# Patient Record
Sex: FEMALE | Race: WHITE | NOT HISPANIC OR LATINO | Employment: OTHER | URBAN - METROPOLITAN AREA
[De-identification: names, ages, dates, MRNs, and addresses within clinical notes are randomized per-mention and may not be internally consistent; named-entity substitution may affect disease eponyms.]

---

## 2018-05-15 ENCOUNTER — OFFICE VISIT (OUTPATIENT)
Dept: ORTHOPEDIC SURGERY | Facility: CLINIC | Age: 78
End: 2018-05-15

## 2018-05-15 VITALS — WEIGHT: 170.19 LBS | BODY MASS INDEX: 30.16 KG/M2 | HEART RATE: 98 BPM | OXYGEN SATURATION: 98 % | HEIGHT: 63 IN

## 2018-05-15 DIAGNOSIS — Z96.651 STATUS POST TOTAL RIGHT KNEE REPLACEMENT: ICD-10-CM

## 2018-05-15 DIAGNOSIS — M17.12 PRIMARY OSTEOARTHRITIS OF LEFT KNEE: ICD-10-CM

## 2018-05-15 DIAGNOSIS — M17.11 PRIMARY OSTEOARTHRITIS OF RIGHT KNEE: Primary | ICD-10-CM

## 2018-05-15 PROCEDURE — 20610 DRAIN/INJ JOINT/BURSA W/O US: CPT | Performed by: ORTHOPAEDIC SURGERY

## 2018-05-15 PROCEDURE — 99203 OFFICE O/P NEW LOW 30 MIN: CPT | Performed by: ORTHOPAEDIC SURGERY

## 2018-05-15 RX ORDER — LEVOTHYROXINE SODIUM 0.07 MG/1
75 TABLET ORAL DAILY
COMMUNITY
Start: 2018-04-21 | End: 2022-08-23

## 2018-05-15 RX ORDER — LIDOCAINE HYDROCHLORIDE 10 MG/ML
3 INJECTION, SOLUTION INFILTRATION; PERINEURAL
Status: COMPLETED | OUTPATIENT
Start: 2018-05-15 | End: 2018-05-15

## 2018-05-15 RX ORDER — ASPIRIN 81 MG/1
81 TABLET, CHEWABLE ORAL DAILY
Status: ON HOLD | COMMUNITY
End: 2019-08-23 | Stop reason: SDUPTHER

## 2018-05-15 RX ORDER — LANSOPRAZOLE 15 MG/1
15 CAPSULE, DELAYED RELEASE ORAL DAILY
COMMUNITY
End: 2019-08-08

## 2018-05-15 RX ORDER — PAROXETINE HYDROCHLORIDE 20 MG/1
20 TABLET, FILM COATED ORAL EVERY MORNING
COMMUNITY
Start: 2018-02-24

## 2018-05-15 RX ORDER — TRIAMCINOLONE ACETONIDE 40 MG/ML
40 INJECTION, SUSPENSION INTRA-ARTICULAR; INTRAMUSCULAR
Status: COMPLETED | OUTPATIENT
Start: 2018-05-15 | End: 2018-05-15

## 2018-05-15 RX ADMIN — TRIAMCINOLONE ACETONIDE 40 MG: 40 INJECTION, SUSPENSION INTRA-ARTICULAR; INTRAMUSCULAR at 15:20

## 2018-05-15 RX ADMIN — LIDOCAINE HYDROCHLORIDE 3 ML: 10 INJECTION, SOLUTION INFILTRATION; PERINEURAL at 15:20

## 2018-05-15 NOTE — PROGRESS NOTES
Procedure   Large Joint Injection   Date/Time: 5/15/2018 3:20 PM  Consent given by: patient  Site marked: site marked  Timeout: Immediately prior to procedure a time out was called to verify the correct patient, procedure, equipment, support staff and site/side marked as required   Supporting Documentation  Indications: pain   Procedure Details  Location: knee - L knee  Needle size: 25 G  Approach: anteromedial  Medications administered: 3 mL lidocaine 1 %; 40 mg triamcinolone acetonide 40 MG/ML  Patient tolerance: patient tolerated the procedure well with no immediate complications

## 2018-05-15 NOTE — PROGRESS NOTES
[facility] [place] Orthopa[facility] Clinic Note    Subjective     Pain of the Right Knee (Pain feels as if it is in the back of the knee. Right was replaced about 9 years ago. Patient has fallen in the past few months. (january/February)) and Pain of the Left Knee (Pain is mainly all over your knee. Pain normally comes and goes, but began about 6 months ago. Taken tylenol to help alleviate some pain. Uses pensaid as well. Pain scale is a 8/10.  )      KRISTOPHER Clayton is a 78 y.o. female. Patient's here for right and left knee pain.  Patient had right total knee arthroplasty 9 years ago.  She did well until recently.  She's had a little bit of pain in the back of the knee.  This was not the case after surgery.  With regards to her left knee, she has anterior lateral pain.  She has posterior knee pain.  She has difficulty kneeling and squatting.  She has been taking over-the-counter medication without a lot of relief.  She's had no further treatment on either knee recently.  Dr. Valentin did her right total knee arthroplasty.     No past medical history on file.   Past Surgical History:   Procedure Laterality Date   • KNEE SURGERY Right       Family History   Problem Relation Age of Onset   • Diabetes Mother    • Hypertension Father    • Stroke Father    • Heart attack Father      Social History     Social History   • Marital status:      Spouse name: N/A   • Number of children: N/A   • Years of education: N/A     Occupational History   • Not on file.     Social History Main Topics   • Smoking status: Never Smoker   • Smokeless tobacco: Never Used   • Alcohol use No   • Drug use: No   • Sexual activity: Defer     Other Topics Concern   • Not on file     Social History Narrative   • No narrative on file      No current outpatient prescriptions on file prior to visit.     No current facility-administered medications on file prior to visit.       Allergies   Allergen Reactions   • Codeine Nausea And Vomiting   •  "Latex Hives        The following portions of the patient's history were reviewed and updated as appropriate: allergies, current medications, past family history, past medical history, past social history, past surgical history and problem list.    Review of Systems   Constitutional: Negative.    HENT: Positive for hearing loss, sinus pressure, sneezing, sore throat and tinnitus.    Eyes: Positive for redness.   Respiratory: Positive for cough and shortness of breath.    Cardiovascular: Positive for leg swelling.   Gastrointestinal: Negative.    Endocrine: Positive for cold intolerance.   Genitourinary: Negative.    Musculoskeletal: Positive for arthralgias, back pain and gait problem.   Skin: Negative.    Allergic/Immunologic: Positive for environmental allergies.   Neurological: Positive for dizziness, light-headedness and headaches.   Hematological: Negative.    Psychiatric/Behavioral: Negative.         Objective      Physical Exam  Pulse 98   Ht 160 cm (62.99\")   Wt 77.2 kg (170 lb 3.1 oz)   SpO2 98%   BMI 30.16 kg/m²     Body mass index is 30.16 kg/m².    General  Mental Status - alert  General Appearance - cooperative, well groomed, not in acute distress  Orientation - Oriented X3  Build & Nutrition - well developed and well nourished  Posture - normal posture  Gait - normal gait     Integumentary  Global Assessment  Examination of related systems reveals - no lymphadenopathy  General Characteristics  Overall examination of the patient's skin reveals - no rashes, no evidence of scars, no suspicious lesions and no bruises.  Color - normal coloration of skin.    Ortho Exam  Peripheral Vascular:    Upper Extremity:   Inspection:  Left--no cyanotic nail beds Right--no cyanotic nail beds   Bilateral:  Pink nail beds with brisk capillary refill   Palpation:  Bilateral radial pulse normal    Musculoskeletal:  Global Assessment:  Overall assessment of Lower Extremity Muscle Strength and Tone:  Left " quadriceps--5/5   Left hamstrings--5/5       Left tibialis anterior--5/5  Left gastroc-soleus--5/5  Left EHL --5/5    Lower Extremity:  Knee/Patella:  No digital clubbing or cyanosis.    Examination of left knee reveals:  Normal deep tendon reflexes, coordination, strength, tone, sensation.  No known fractures or deformities.    Inspection and Palpation:  Left knee:  Tenderness:  Over the lateral joint line and moderate severity  Effusion:  1+  Crepitus:  Positive  Pulses:  2+  Ecchymosis:  None  Warmth:  None     ROM:  Left:  Extension:5    Flexion:120  Right:  Extension:5     Flexion:120    Instability:    Left:  Lachman Test:  Negative, Varus stress test negative, Valgus stress test negative    Deformities/Malalignments/Discrepancies:    Left:  Genu valgum    Functional Testing:  Vineet's test:  Negative  Patella grind test:  Positive  Q-angle:  normal    Imaging/Studies  Imaging Results (last 24 hours)     Procedure Component Value Units Date/Time    XR Knee 3 View Bilateral [[id number]] Resulted:  05/15/18 1509     Updated:  05/15/18 1513    Narrative:       Right X-ray Knee     Indication: status-post TKA    Study:  AP, Lateral, and Sunrise views of Right knee    Comparison: Right knee 6/29/2009    Findings:  No signs of acute fracture is visualized  No signs of loosening are appreciated  Components are well aligned    Impression:  Status post Right total knee arthroplasty.        Left Knee X-Ray    Indication: Pain    Study:  Upright AP, Lateral, and Sunrise views of Left knee(s)    Comparison: None     Findings:    Patient appears to have mild degenerative changes in the medial   compartment.  There are early moderate degenerative changes in the lateral   compartment.  There are end-stage changes in the patellofemoral   compartment.  Patient has overall neutral alignment.        Impression: End-stage patellofemoral compartment osteoarthritis of the   left knee.                  Assessment:  1. Primary  osteoarthritis of right knee    2. Primary osteoarthritis of left knee    3. Status post total right knee replacement        Plan:  1. Continue the counter medication  2. For her painful right knee, she'll be sent to physical therapy.  If she is not a lot better in 6 weeks, infection labs will be drawn.  3. With regards to her left knee end-stage patellofemoral arthritis, Kenalog injection will be given today.  See her back in 6 weeks and assess her need for further modalities.      Medical Decision Making  Management Options : over-the-counter medicine, prescription/IM medicine and physical/occupational therapy  Data/Risk: radiology tests and independent visualization of imaging, lab tests, or EMG/NCV    Tristan Gonzalez MD  05/15/18  3:32 PM

## 2018-06-26 ENCOUNTER — OFFICE VISIT (OUTPATIENT)
Dept: ORTHOPEDIC SURGERY | Facility: CLINIC | Age: 78
End: 2018-06-26

## 2018-06-26 VITALS — HEART RATE: 93 BPM | HEIGHT: 63 IN | BODY MASS INDEX: 29.96 KG/M2 | WEIGHT: 169.09 LBS | OXYGEN SATURATION: 95 %

## 2018-06-26 DIAGNOSIS — Z96.651 STATUS POST TOTAL RIGHT KNEE REPLACEMENT: ICD-10-CM

## 2018-06-26 DIAGNOSIS — M17.12 PRIMARY OSTEOARTHRITIS OF LEFT KNEE: Primary | ICD-10-CM

## 2018-06-26 PROCEDURE — 99213 OFFICE O/P EST LOW 20 MIN: CPT | Performed by: ORTHOPAEDIC SURGERY

## 2018-06-26 NOTE — PROGRESS NOTES
"    [facility] [place] [facility] [place]    Subjective     CC: Follow-up of the Right Knee (Primary Osteoarthritis of Right Knee; Status Post Total Right Knee Replacement; 6 week f/u) and Follow-up of the Left Knee (Primary Osteoarthritis of Right Knee; 6 week f/u )      KRISTOPHER Clayton is a 78 y.o. female. Patient returns the office today for follow-up of her right and left knee pain.  The right knee has gotten much better after physical therapy.  The Kenalog injection helped for 4-5 weeks but has worn off.  Patient would like to delay at least one possibly avoid left total knee arthroplasty.       ROS:    Constiutional:Pt denies fever, chills, nausea, or vomiting.  MSK:as above    Objective      Past Medical History  History reviewed. No pertinent past medical history.      Physical Exam  Pulse 93   Ht 160 cm (62.99\")   Wt 76.7 kg (169 lb 1.5 oz)   SpO2 95%   BMI 29.96 kg/m²     Body mass index is 29.96 kg/m².    Patient is well nourished and well developed.        Ortho Exam  Peripheral Vascular:    Upper Extremity:   Inspection:  Left--no cyanotic nail beds Right--no cyanotic nail beds   Bilateral:  Pink nail beds with brisk capillary refill   Palpation:  Bilateral radial pulse normal    Musculoskeletal:  Global Assessment:  Overall assessment of Lower Extremity Muscle Strength and Tone:  Left quadriceps--5/5   Left hamstrings--5/5       Left tibialis anterior--5/5  Left gastroc-soleus--5/5  Left EHL --5/5    Lower Extremity:  Knee/Patella:  No digital clubbing or cyanosis.    Examination of left knee reveals:  Normal deep tendon reflexes, coordination, strength, tone, sensation.  No known fractures or deformities.    Inspection and Palpation:  Left knee:  Tenderness:  Over the lateral joint line and moderate severity  Effusion:  1+  Crepitus:  Positive  Pulses:  2+  Ecchymosis:  None  Warmth:  None     ROM:  Left:  Extension:5    Flexion:120  Right:  Extension:5     Flexion:120    Instability:    Left:  " Lachman Test:  Negative, Varus stress test negative, Valgus stress test negative    Deformities/Malalignments/Discrepancies:    Left:  Genu valgum    Functional Testing:  Vineet's test:  Negative  Patella grind test:  Positive  Q-angle:  normal    Imaging/Labs/EMG Reviewed:  Imaging Results (last 24 hours)     ** No results found for the last 24 hours. **          Assessment:  1. Primary osteoarthritis of left knee    2. Status post total right knee replacement        Plan:  1. Recommend over the counter anti-inflammatories for pain and/or swelling  2. Continue physical therapy  3. Viscosupplementation will be ordered of the left knee.  Order has been placed today.  4. Follow-up to begin Visco #1 in the left knee.      Medical Decision Making  Management Options : over-the-counter medicine, prescription/IM medicine and physical/occupational therapy      Tristan Gonzalez MD  06/26/18  6:38 PM

## 2018-07-19 ENCOUNTER — CLINICAL SUPPORT (OUTPATIENT)
Dept: ORTHOPEDIC SURGERY | Facility: CLINIC | Age: 78
End: 2018-07-19

## 2018-07-19 DIAGNOSIS — M17.12 PRIMARY OSTEOARTHRITIS OF LEFT KNEE: Primary | ICD-10-CM

## 2018-07-19 DIAGNOSIS — Z96.651 STATUS POST TOTAL RIGHT KNEE REPLACEMENT: ICD-10-CM

## 2018-07-19 PROCEDURE — 20610 DRAIN/INJ JOINT/BURSA W/O US: CPT | Performed by: ORTHOPAEDIC SURGERY

## 2018-07-19 RX ORDER — LIDOCAINE HYDROCHLORIDE 10 MG/ML
3 INJECTION, SOLUTION INFILTRATION; PERINEURAL
Status: COMPLETED | OUTPATIENT
Start: 2018-07-19 | End: 2018-07-19

## 2018-07-19 RX ADMIN — LIDOCAINE HYDROCHLORIDE 3 ML: 10 INJECTION, SOLUTION INFILTRATION; PERINEURAL at 10:43

## 2018-07-19 NOTE — PROGRESS NOTES
Procedure   Large Joint Arthrocentesis  Date/Time: 7/19/2018 10:43 AM  Consent given by: patient  Site marked: site marked  Timeout: Immediately prior to procedure a time out was called to verify the correct patient, procedure, equipment, support staff and site/side marked as required   Supporting Documentation  Indications: pain   Procedure Details  Location: knee - L knee  Preparation: Patient was prepped and draped in the usual sterile fashion  Needle size: 22 G  Approach: superior  Medications administered: 3 mL lidocaine 1 %; 30 mg Hyaluronan 30 MG/2ML  Aspirate: clear (to verify intraarticular placement of the needle)  Patient tolerance: patient tolerated the procedure well with no immediate complications

## 2018-07-19 NOTE — PROGRESS NOTES
Patient is here for Orthovisc 1 to the left knee.  This was injected through superior lateral approach and tolerated well.  See her back in one week for Orthovisc No. 2.

## 2018-07-26 ENCOUNTER — CLINICAL SUPPORT (OUTPATIENT)
Dept: ORTHOPEDIC SURGERY | Facility: CLINIC | Age: 78
End: 2018-07-26

## 2018-07-26 DIAGNOSIS — M17.12 PRIMARY OSTEOARTHRITIS OF LEFT KNEE: Primary | ICD-10-CM

## 2018-07-26 PROCEDURE — 20610 DRAIN/INJ JOINT/BURSA W/O US: CPT | Performed by: ORTHOPAEDIC SURGERY

## 2018-07-26 RX ORDER — LIDOCAINE HYDROCHLORIDE 10 MG/ML
3 INJECTION, SOLUTION INFILTRATION; PERINEURAL
Status: COMPLETED | OUTPATIENT
Start: 2018-07-26 | End: 2018-07-26

## 2018-07-26 RX ADMIN — LIDOCAINE HYDROCHLORIDE 3 ML: 10 INJECTION, SOLUTION INFILTRATION; PERINEURAL at 09:59

## 2018-07-26 NOTE — PROGRESS NOTES
Procedure   Large Joint Arthrocentesis  Date/Time: 7/26/2018 9:59 AM  Consent given by: patient  Site marked: site marked  Timeout: Immediately prior to procedure a time out was called to verify the correct patient, procedure, equipment, support staff and site/side marked as required   Supporting Documentation  Indications: pain   Procedure Details  Location: knee - L knee  Preparation: Patient was prepped and draped in the usual sterile fashion  Needle size: 22 G  Approach: superior  Medications administered: 3 mL lidocaine 1 %; 30 mg Hyaluronan 30 MG/2ML  Aspirate: clear (to verify intraarticular placement of the needle)  Patient tolerance: patient tolerated the procedure well with no immediate complications

## 2018-07-26 NOTE — PROGRESS NOTES
Patient is here for Orthovisc No. 2 to the left knee.  This was injected through superior lateral approach and tolerated well.  Follow-up in a week for Orthovisc No. 3.

## 2018-08-02 ENCOUNTER — CLINICAL SUPPORT (OUTPATIENT)
Dept: ORTHOPEDIC SURGERY | Facility: CLINIC | Age: 78
End: 2018-08-02

## 2018-08-02 DIAGNOSIS — M17.12 PRIMARY OSTEOARTHRITIS OF LEFT KNEE: Primary | ICD-10-CM

## 2018-08-02 PROCEDURE — 20610 DRAIN/INJ JOINT/BURSA W/O US: CPT | Performed by: ORTHOPAEDIC SURGERY

## 2018-08-02 RX ORDER — LIDOCAINE HYDROCHLORIDE 10 MG/ML
3 INJECTION, SOLUTION INFILTRATION; PERINEURAL
Status: COMPLETED | OUTPATIENT
Start: 2018-08-02 | End: 2018-08-02

## 2018-08-02 RX ADMIN — LIDOCAINE HYDROCHLORIDE 3 ML: 10 INJECTION, SOLUTION INFILTRATION; PERINEURAL at 09:52

## 2018-08-02 NOTE — PROGRESS NOTES
Procedure   Large Joint Arthrocentesis  Date/Time: 8/2/2018 9:52 AM  Consent given by: patient  Site marked: site marked  Timeout: Immediately prior to procedure a time out was called to verify the correct patient, procedure, equipment, support staff and site/side marked as required   Supporting Documentation  Indications: pain   Procedure Details  Location: knee - L knee  Preparation: Patient was prepped and draped in the usual sterile fashion  Needle size: 22 G  Approach: superior  Medications administered: 3 mL lidocaine 1 %; 30 mg Hyaluronan 30 MG/2ML  Aspirate: clear (to verify intraarticular placement of the needle)  Patient tolerance: patient tolerated the procedure well with no immediate complications

## 2018-08-02 NOTE — PROGRESS NOTES
Patient is here for Orthovisc No. 3 to the left knee.  This was tolerated well.  Patient is currently getting relief from the injection.  Follow-up in 6 weeks to assess her need for further modalities.

## 2018-09-13 ENCOUNTER — OFFICE VISIT (OUTPATIENT)
Dept: ORTHOPEDIC SURGERY | Facility: CLINIC | Age: 78
End: 2018-09-13

## 2018-09-13 VITALS — WEIGHT: 165.34 LBS | OXYGEN SATURATION: 94 % | BODY MASS INDEX: 29.3 KG/M2 | HEIGHT: 63 IN | HEART RATE: 87 BPM

## 2018-09-13 DIAGNOSIS — Z96.651 STATUS POST TOTAL RIGHT KNEE REPLACEMENT: ICD-10-CM

## 2018-09-13 DIAGNOSIS — M17.12 PRIMARY OSTEOARTHRITIS OF LEFT KNEE: Primary | ICD-10-CM

## 2018-09-13 PROCEDURE — 20610 DRAIN/INJ JOINT/BURSA W/O US: CPT | Performed by: ORTHOPAEDIC SURGERY

## 2018-09-13 RX ORDER — LIDOCAINE HYDROCHLORIDE 10 MG/ML
3 INJECTION, SOLUTION INFILTRATION; PERINEURAL
Status: COMPLETED | OUTPATIENT
Start: 2018-09-13 | End: 2018-09-13

## 2018-09-13 RX ORDER — TRIAMCINOLONE ACETONIDE 40 MG/ML
40 INJECTION, SUSPENSION INTRA-ARTICULAR; INTRAMUSCULAR
Status: COMPLETED | OUTPATIENT
Start: 2018-09-13 | End: 2018-09-13

## 2018-09-13 RX ADMIN — TRIAMCINOLONE ACETONIDE 40 MG: 40 INJECTION, SUSPENSION INTRA-ARTICULAR; INTRAMUSCULAR at 09:34

## 2018-09-13 RX ADMIN — LIDOCAINE HYDROCHLORIDE 3 ML: 10 INJECTION, SOLUTION INFILTRATION; PERINEURAL at 09:34

## 2018-09-13 NOTE — PROGRESS NOTES
Procedure   Large Joint Arthrocentesis  Date/Time: 9/13/2018 9:34 AM  Consent given by: patient  Site marked: site marked  Timeout: Immediately prior to procedure a time out was called to verify the correct patient, procedure, equipment, support staff and site/side marked as required   Supporting Documentation  Indications: pain   Procedure Details  Location: knee - L knee  Preparation: Patient was prepped and draped in the usual sterile fashion  Needle size: 25 G  Approach: anterolateral  Medications administered: 3 mL lidocaine 1 %; 40 mg triamcinolone acetonide 40 MG/ML  Patient tolerance: patient tolerated the procedure well with no immediate complications

## 2018-09-13 NOTE — PROGRESS NOTES
[facility] [place] [facility]    Subjective     CC: Follow-up of the Left Knee (6 weeks)      KRISTOPHER Clayton is a 78 y.o. female.  Patient is here 6 weeks out from an Orthovisc series to the left knee.  She is gotten reasonably good relief but still having pain laterally.  Patient has patellofemoral arthritis that is end-stage.  She has a total knee on the contralateral side.       ROS:    Constiutional:Pt denies fever, chills, nausea, or vomiting.  MSK:as above    Objective          Assessment:  1. Primary osteoarthritis of left knee    2. Status post total right knee replacement        Plan:  1. Recommend over the counter anti-inflammatories for pain and/or swelling  2. Kenalog injection will be given into the left knee today  3. Follow-up in 3 months or sooner if necessary      Tristan Gonzalez MD  09/13/18  5:33 PM

## 2018-12-13 ENCOUNTER — OFFICE VISIT (OUTPATIENT)
Dept: ORTHOPEDIC SURGERY | Facility: CLINIC | Age: 78
End: 2018-12-13

## 2018-12-13 VITALS — BODY MASS INDEX: 29.41 KG/M2 | OXYGEN SATURATION: 96 % | HEIGHT: 63 IN | HEART RATE: 79 BPM | WEIGHT: 166.01 LBS

## 2018-12-13 DIAGNOSIS — M17.12 PRIMARY OSTEOARTHRITIS OF LEFT KNEE: Primary | ICD-10-CM

## 2018-12-13 DIAGNOSIS — S83.242D TEAR OF MEDIAL MENISCUS OF LEFT KNEE, CURRENT, UNSPECIFIED TEAR TYPE, SUBSEQUENT ENCOUNTER: ICD-10-CM

## 2018-12-13 PROCEDURE — 20610 DRAIN/INJ JOINT/BURSA W/O US: CPT | Performed by: ORTHOPAEDIC SURGERY

## 2018-12-13 PROCEDURE — 99213 OFFICE O/P EST LOW 20 MIN: CPT | Performed by: ORTHOPAEDIC SURGERY

## 2018-12-13 RX ORDER — LIDOCAINE HYDROCHLORIDE 10 MG/ML
3 INJECTION, SOLUTION INFILTRATION; PERINEURAL
Status: COMPLETED | OUTPATIENT
Start: 2018-12-13 | End: 2018-12-13

## 2018-12-13 RX ORDER — TRIAMCINOLONE ACETONIDE 40 MG/ML
40 INJECTION, SUSPENSION INTRA-ARTICULAR; INTRAMUSCULAR
Status: COMPLETED | OUTPATIENT
Start: 2018-12-13 | End: 2018-12-13

## 2018-12-13 RX ADMIN — LIDOCAINE HYDROCHLORIDE 3 ML: 10 INJECTION, SOLUTION INFILTRATION; PERINEURAL at 14:09

## 2018-12-13 RX ADMIN — TRIAMCINOLONE ACETONIDE 40 MG: 40 INJECTION, SUSPENSION INTRA-ARTICULAR; INTRAMUSCULAR at 14:09

## 2018-12-13 NOTE — PROGRESS NOTES
Procedure   Large Joint Arthrocentesis: L knee  Date/Time: 12/13/2018 2:09 PM  Consent given by: patient  Site marked: site marked  Timeout: Immediately prior to procedure a time out was called to verify the correct patient, procedure, equipment, support staff and site/side marked as required   Supporting Documentation  Indications: pain   Procedure Details  Location: knee - L knee  Preparation: Patient was prepped and draped in the usual sterile fashion  Needle size: 25 G  Approach: superior  Medications administered: 3 mL lidocaine 1 %; 40 mg triamcinolone acetonide 40 MG/ML  Patient tolerance: patient tolerated the procedure well with no immediate complications

## 2018-12-13 NOTE — PROGRESS NOTES
"    [facility] [place] Orthopaedic Surgery Clinic Note    Subjective     CC: Follow-up (3 month follow up. Last left knee injection 9/13/18)      KRISTOPHER Clayton is a 78 y.o. female.  Patient returns for follow-up after her left knee injection in September 2018.  This helped her but did not totally get rid of her pain.  When I ask her where her knee hurts the most today, she tells me along the medial joint line.  She has difficulty twisting.  She has occasional episodic sharp pain in the knee.  She has difficulty owing up and down steps.  We know that she has end-stage patellofemoral arthritis.       ROS:    Constiutional:Pt denies fever, chills, nausea, or vomiting.  MSK:as above    Objective      Past Medical History  History reviewed. No pertinent past medical history.      Physical Exam  Pulse 79   Ht 160 cm (62.99\")   Wt 75.3 kg (166 lb 0.1 oz)   SpO2 96%   BMI 29.42 kg/m²     Body mass index is 29.42 kg/m².    Patient is well nourished and well developed.        Ortho Exam  Patient has direct medial joint line tenderness  Positive medial Vineet  Pain with ballottement of her patella  Globally tender in the knee      Assessment:  1. Primary osteoarthritis of left knee    2. Tear of medial meniscus of left knee, current, unspecified tear type, subsequent encounter        Plan:  1. Recommend over the counter anti-inflammatories for pain and/or swelling  2. We have discussed repeating her steroid injection today to get her through the holidays and then if she is still not completely well in 4-6 weeks, ordering an MRI to see if there is anything that could be modified with an arthroscopic approach rather than arthroplasty.      Medical Decision Making  Management Options : over-the-counter medicine and prescription/IM medicine  Data/Risk: radiology tests    Tristan Gonzalez MD  12/13/18  2:09 PM  "

## 2019-01-24 ENCOUNTER — OFFICE VISIT (OUTPATIENT)
Dept: ORTHOPEDIC SURGERY | Facility: CLINIC | Age: 79
End: 2019-01-24

## 2019-01-24 VITALS — WEIGHT: 164.24 LBS | HEART RATE: 82 BPM | OXYGEN SATURATION: 98 % | HEIGHT: 63 IN | BODY MASS INDEX: 29.1 KG/M2

## 2019-01-24 DIAGNOSIS — S83.242D TEAR OF MEDIAL MENISCUS OF LEFT KNEE, CURRENT, UNSPECIFIED TEAR TYPE, SUBSEQUENT ENCOUNTER: ICD-10-CM

## 2019-01-24 DIAGNOSIS — M17.12 PRIMARY OSTEOARTHRITIS OF LEFT KNEE: Primary | ICD-10-CM

## 2019-01-24 PROCEDURE — 99213 OFFICE O/P EST LOW 20 MIN: CPT | Performed by: ORTHOPAEDIC SURGERY

## 2019-01-24 NOTE — PROGRESS NOTES
"    [facility] [place] Orthopa[facility] Surgery Clinic Note    Subjective     CC: Follow-up of the Left Knee (6 weeks)      KRISTOPHER Clayton is a 78 y.o. female.  Patient's here today for follow-up of her left knee.  She was injected with steroid at her last visit and this has started to wear off.  She continues to have difficulty with twisting and the sensation that something is shifting in and out on the medial side of her knee       ROS:    Constiutional:Pt denies fever, chills, nausea, or vomiting.  MSK:as above    Objective      Past Medical History  History reviewed. No pertinent past medical history.      Physical Exam  Pulse 82   Ht 160 cm (62.99\")   Wt 74.5 kg (164 lb 3.9 oz)   SpO2 98%   BMI 29.10 kg/m²     Body mass index is 29.1 kg/m².    Patient is well nourished and well developed.        Ortho Exam  Medial joint line tenderness  Positive medial Vineet      Assessment:  1. Primary osteoarthritis of left knee    2. Tear of medial meniscus of left knee, current, unspecified tear type, subsequent encounter        Plan:  1. Recommend over the counter anti-inflammatories for pain and/or swelling  2. MRI of the left knee will be requested  3. Follow-up to review that study  4. We will pay close attention to see if there is anything that seems amenable to arthroscopy rather than arthroplasty to address her medial sided symptoms      Medical Decision Making  Management Options : over-the-counter medicine  Data/Risk: radiology tests    Tristan Gonzalez MD  01/24/19  1:54 PM  "

## 2019-02-12 ENCOUNTER — APPOINTMENT (OUTPATIENT)
Dept: MRI IMAGING | Facility: HOSPITAL | Age: 79
End: 2019-02-12

## 2019-02-18 ENCOUNTER — HOSPITAL ENCOUNTER (OUTPATIENT)
Dept: MRI IMAGING | Facility: HOSPITAL | Age: 79
Discharge: HOME | End: 2019-02-18
Admitting: ORTHOPAEDIC SURGERY

## 2019-02-18 DIAGNOSIS — S83.242D TEAR OF MEDIAL MENISCUS OF LEFT KNEE, CURRENT, UNSPECIFIED TEAR TYPE, SUBSEQUENT ENCOUNTER: ICD-10-CM

## 2019-02-18 PROCEDURE — 73721 MRI JNT OF LWR EXTRE W/O DYE: CPT

## 2019-02-19 ENCOUNTER — OFFICE VISIT (OUTPATIENT)
Dept: ORTHOPEDIC SURGERY | Facility: CLINIC | Age: 79
End: 2019-02-19

## 2019-02-19 VITALS — HEIGHT: 63 IN | BODY MASS INDEX: 29.1 KG/M2 | HEART RATE: 86 BPM | WEIGHT: 164.24 LBS | OXYGEN SATURATION: 95 %

## 2019-02-19 DIAGNOSIS — M17.12 PRIMARY OSTEOARTHRITIS OF LEFT KNEE: Primary | ICD-10-CM

## 2019-02-19 DIAGNOSIS — Z96.651 STATUS POST TOTAL RIGHT KNEE REPLACEMENT: ICD-10-CM

## 2019-02-19 PROCEDURE — 99213 OFFICE O/P EST LOW 20 MIN: CPT | Performed by: ORTHOPAEDIC SURGERY

## 2019-02-19 NOTE — PROGRESS NOTES
"    [facility] [place] [facility]opa[facility] Surgery Clinic Note    Subjective     CC: Follow-up of the Left Knee (After MRI 02/18/2019)      HPI    Eli Clayton is a 78 y.o. female.  Patient is here today to review the MRI of her left knee.  She was having a lot of mechanical disturbance in the anterior medial aspect of the knee and we were looking for medial meniscal tear that could be amenable to arthroscopy.  She has had Visco supplementation in the past  In the contralateral knee and recalls doing well after Dr. Valentin did that for her.      ROS:    Constiutional:Pt denies fever, chills, nausea, or vomiting.  MSK:as above    Objective      Past Medical History  History reviewed. No pertinent past medical history.      Physical Exam  Pulse 86   Ht 160 cm (62.99\")   Wt 74.5 kg (164 lb 3.9 oz)   SpO2 95%   BMI 29.10 kg/m²     Body mass index is 29.1 kg/m².    Patient is well nourished and well developed.        Ortho Exam  Patient is tender over the medial joint line  Crepitance with flexion    Imaging/Labs/EMG Reviewed:  We reviewed the MRI the patient's left knee from epic dated 2/18/2019.  There does not appear to be any evidence of medial or lateral meniscal tear.  There is tricompartmental arthritis with the most severe changes being in the patellofemoral compartment.  The reading by Dr. Cunningham is consistent with this as well.    Assessment:  1. Primary osteoarthritis of left knee    2. Status post total right knee replacement        Plan:  1. Recommend over the counter anti-inflammatories for pain and/or swelling  2. At this point, I do not believe that arthroscopy will provide a lot of benefit for the patient long-term.  We discussed that with her at length this afternoon.  Her diagnosis for the left knee going forward will be OA only.  3. We will attempt to get a 3 shot Visco supplement approved and see her back for injection #1      Medical Decision Making  Management Options : over-the-counter medicine and " prescription/IM medicine  Data/Risk: independent visualization of imaging, lab tests, or EMG/NCV    Tristan Gonzalez MD  02/22/19  1:27 PM

## 2019-02-26 ENCOUNTER — CLINICAL SUPPORT (OUTPATIENT)
Dept: ORTHOPEDIC SURGERY | Facility: CLINIC | Age: 79
End: 2019-02-26

## 2019-02-26 DIAGNOSIS — M17.12 PRIMARY OSTEOARTHRITIS OF LEFT KNEE: Primary | ICD-10-CM

## 2019-02-26 PROCEDURE — 20610 DRAIN/INJ JOINT/BURSA W/O US: CPT | Performed by: ORTHOPAEDIC SURGERY

## 2019-02-26 RX ORDER — LIDOCAINE HYDROCHLORIDE 10 MG/ML
3 INJECTION, SOLUTION INFILTRATION; PERINEURAL
Status: COMPLETED | OUTPATIENT
Start: 2019-02-26 | End: 2019-02-26

## 2019-02-26 RX ADMIN — LIDOCAINE HYDROCHLORIDE 3 ML: 10 INJECTION, SOLUTION INFILTRATION; PERINEURAL at 14:22

## 2019-02-26 NOTE — PROGRESS NOTES
Procedure   Large Joint Arthrocentesis: L knee  Date/Time: 2/26/2019 2:22 PM  Consent given by: patient  Site marked: site marked  Timeout: Immediately prior to procedure a time out was called to verify the correct patient, procedure, equipment, support staff and site/side marked as required   Supporting Documentation  Indications: pain   Procedure Details  Location: knee - L knee  Preparation: Patient was prepped and draped in the usual sterile fashion  Needle size: 22 G  Approach: anterolateral  Medications administered: 30 mg Hyaluronan 30 MG/2ML; 3 mL lidocaine 1 %  Patient tolerance: patient tolerated the procedure well with no immediate complications

## 2019-02-26 NOTE — PROGRESS NOTES
Patient is here for Orthovisc No. 1 to the left knee.  This is injected there is superolateral approach and tolerated well.  Follow-up in 1 week for Orthovisc No. 2.

## 2019-03-05 ENCOUNTER — CLINICAL SUPPORT (OUTPATIENT)
Dept: ORTHOPEDIC SURGERY | Facility: CLINIC | Age: 79
End: 2019-03-05

## 2019-03-05 DIAGNOSIS — M17.12 PRIMARY OSTEOARTHRITIS OF LEFT KNEE: Primary | ICD-10-CM

## 2019-03-05 PROCEDURE — 20610 DRAIN/INJ JOINT/BURSA W/O US: CPT | Performed by: ORTHOPAEDIC SURGERY

## 2019-03-05 RX ORDER — LIDOCAINE HYDROCHLORIDE 10 MG/ML
3 INJECTION, SOLUTION INFILTRATION; PERINEURAL
Status: COMPLETED | OUTPATIENT
Start: 2019-03-05 | End: 2019-03-05

## 2019-03-05 RX ADMIN — LIDOCAINE HYDROCHLORIDE 3 ML: 10 INJECTION, SOLUTION INFILTRATION; PERINEURAL at 13:26

## 2019-03-05 NOTE — PROGRESS NOTES
Patient is here for Orthovisc No. 2 to the left knee.  This was injected through a superolateral approach and tolerated well.  Follow-up in 1 week for Orthovisc No. 3.

## 2019-03-05 NOTE — PROGRESS NOTES
Procedure   Large Joint Arthrocentesis: L knee  Date/Time: 3/5/2019 1:26 PM  Consent given by: patient  Site marked: site marked  Timeout: Immediately prior to procedure a time out was called to verify the correct patient, procedure, equipment, support staff and site/side marked as required   Supporting Documentation  Indications: pain   Procedure Details  Location: knee - L knee  Preparation: Patient was prepped and draped in the usual sterile fashion  Needle size: 22 G  Approach: anterolateral  Medications administered: 30 mg Hyaluronan 30 MG/2ML; 3 mL lidocaine 1 %  Patient tolerance: patient tolerated the procedure well with no immediate complications

## 2019-03-12 ENCOUNTER — CLINICAL SUPPORT (OUTPATIENT)
Dept: ORTHOPEDIC SURGERY | Facility: CLINIC | Age: 79
End: 2019-03-12

## 2019-03-12 DIAGNOSIS — M17.12 PRIMARY OSTEOARTHRITIS OF LEFT KNEE: Primary | ICD-10-CM

## 2019-03-12 PROCEDURE — 20610 DRAIN/INJ JOINT/BURSA W/O US: CPT | Performed by: ORTHOPAEDIC SURGERY

## 2019-03-12 RX ORDER — LIDOCAINE HYDROCHLORIDE 10 MG/ML
3 INJECTION, SOLUTION INFILTRATION; PERINEURAL
Status: COMPLETED | OUTPATIENT
Start: 2019-03-12 | End: 2019-03-12

## 2019-03-12 RX ADMIN — LIDOCAINE HYDROCHLORIDE 3 ML: 10 INJECTION, SOLUTION INFILTRATION; PERINEURAL at 13:23

## 2019-03-12 NOTE — PROGRESS NOTES
Patient is here for Orthovisc No. 3 to the left knee.  Injections 1 and 2 have not given her a lot of relief at this point.  Injection #3 was delivered through a superior lateral approach today and tolerated well.  We will see the patient back in 6 weeks and assess her need for further modalities to the knee.

## 2019-03-12 NOTE — PROGRESS NOTES
Procedure   Large Joint Arthrocentesis: L knee  Date/Time: 3/12/2019 1:23 PM  Consent given by: patient  Site marked: site marked  Timeout: Immediately prior to procedure a time out was called to verify the correct patient, procedure, equipment, support staff and site/side marked as required   Supporting Documentation  Indications: pain   Procedure Details  Location: knee - L knee  Preparation: Patient was prepped and draped in the usual sterile fashion  Needle size: 22 G  Approach: superior  Medications administered: 3 mL lidocaine 1 %; 30 mg Hyaluronan 30 MG/2ML  Aspirate: clear (to verify intraarticular placement of the needle)  Patient tolerance: patient tolerated the procedure well with no immediate complications

## 2019-04-23 ENCOUNTER — OFFICE VISIT (OUTPATIENT)
Dept: ORTHOPEDIC SURGERY | Facility: CLINIC | Age: 79
End: 2019-04-23

## 2019-04-23 VITALS — WEIGHT: 164.24 LBS | BODY MASS INDEX: 29.1 KG/M2 | HEIGHT: 63 IN | OXYGEN SATURATION: 97 % | HEART RATE: 85 BPM

## 2019-04-23 DIAGNOSIS — M17.12 PRIMARY OSTEOARTHRITIS OF LEFT KNEE: Primary | ICD-10-CM

## 2019-04-23 DIAGNOSIS — Z96.651 STATUS POST TOTAL RIGHT KNEE REPLACEMENT: ICD-10-CM

## 2019-04-23 PROCEDURE — 20610 DRAIN/INJ JOINT/BURSA W/O US: CPT | Performed by: ORTHOPAEDIC SURGERY

## 2019-04-23 RX ORDER — TRIAMCINOLONE ACETONIDE 40 MG/ML
40 INJECTION, SUSPENSION INTRA-ARTICULAR; INTRAMUSCULAR
Status: COMPLETED | OUTPATIENT
Start: 2019-04-23 | End: 2019-04-23

## 2019-04-23 RX ORDER — LIDOCAINE HYDROCHLORIDE 10 MG/ML
3 INJECTION, SOLUTION INFILTRATION; PERINEURAL
Status: COMPLETED | OUTPATIENT
Start: 2019-04-23 | End: 2019-04-23

## 2019-04-23 RX ADMIN — LIDOCAINE HYDROCHLORIDE 3 ML: 10 INJECTION, SOLUTION INFILTRATION; PERINEURAL at 13:56

## 2019-04-23 RX ADMIN — TRIAMCINOLONE ACETONIDE 40 MG: 40 INJECTION, SUSPENSION INTRA-ARTICULAR; INTRAMUSCULAR at 13:56

## 2019-04-23 NOTE — PROGRESS NOTES
[facility] [place] [facility]    Subjective     CC: Follow-up of the Left Knee (6 weeks)      HPI    Eli Clayton is a 78 y.o. female.  Patient is here today for follow-up after receiving Orthovisc series completed 3/12/2019.  She got a little bit of relief only.  She is scheduled to go to [employer] camp over the summer and wants to be able to attend that.      ROS:    Constiutional:Pt denies fever, chills, nausea, or vomiting.  MSK:as above    Objective      Past Medical History  History reviewed. No pertinent past medical history.      Plan:  1. Recommend over the counter anti-inflammatories for pain and/or swelling  2. Left knee injection will be given today.  We will use Kenalog.  3. Follow-up in 2 months and we will see how she is doing overall.  She like to get through the summer before considering any type of surgical intervention.  When she returns in a couple of months, I would like new x-rays.      Tristan Gonzalez MD  04/23/19  2:03 PM

## 2019-04-23 NOTE — PROGRESS NOTES
Procedure   Large Joint Arthrocentesis: L knee  Date/Time: 4/23/2019 1:56 PM  Consent given by: patient  Site marked: site marked  Timeout: Immediately prior to procedure a time out was called to verify the correct patient, procedure, equipment, support staff and site/side marked as required   Supporting Documentation  Indications: pain   Procedure Details  Location: knee - L knee  Preparation: Patient was prepped and draped in the usual sterile fashion  Needle size: 25 G  Approach: anterolateral  Medications administered: 3 mL lidocaine 1 %; 40 mg triamcinolone acetonide 40 MG/ML  Patient tolerance: patient tolerated the procedure well with no immediate complications

## 2019-07-29 ENCOUNTER — OFFICE VISIT (OUTPATIENT)
Dept: ORTHOPEDIC SURGERY | Facility: CLINIC | Age: 79
End: 2019-07-29

## 2019-07-29 ENCOUNTER — PREP FOR SURGERY (OUTPATIENT)
Dept: OTHER | Facility: HOSPITAL | Age: 79
End: 2019-07-29

## 2019-07-29 VITALS — HEART RATE: 83 BPM | BODY MASS INDEX: 30.16 KG/M2 | HEIGHT: 63 IN | OXYGEN SATURATION: 98 % | WEIGHT: 170.19 LBS

## 2019-07-29 DIAGNOSIS — M17.12 PRIMARY OSTEOARTHRITIS OF LEFT KNEE: Primary | ICD-10-CM

## 2019-07-29 PROCEDURE — 99214 OFFICE O/P EST MOD 30 MIN: CPT | Performed by: ORTHOPAEDIC SURGERY

## 2019-07-29 RX ORDER — CEFAZOLIN SODIUM 2 G/100ML
2 INJECTION, SOLUTION INTRAVENOUS ONCE
Status: CANCELLED | OUTPATIENT
Start: 2019-07-29 | End: 2019-07-29

## 2019-07-29 RX ORDER — OXYCODONE HCL 10 MG/1
10 TABLET, FILM COATED, EXTENDED RELEASE ORAL ONCE
Status: CANCELLED | OUTPATIENT
Start: 2019-07-29 | End: 2019-07-29

## 2019-07-29 RX ORDER — ACETAMINOPHEN 500 MG
1000 TABLET ORAL ONCE
Status: CANCELLED | OUTPATIENT
Start: 2019-07-29 | End: 2019-07-29

## 2019-07-29 NOTE — PROGRESS NOTES
"    [facility] [place] [facility]    Subjective     CC: Follow-up (3 months - Primary osteoarthritis of left knee )      HPI    Eli Clayton is a 79 y.o. female.  Patient returns the office today for follow-up of her left knee.  She has brought her daughter with her today.  She tells me the cortical steroid injection given in April did not help her as much as she would have liked.  She has failed Visco supplementation as well.  She has difficulty getting up and down from a seated position and walking without significant pain.  She rates her pain is moderate to severe.  She is failed anti-inflammatories as well.  She did well from a right total knee with Dr. Valentin in 2009.      ROS:    Constiutional:Pt denies fever, chills, nausea, or vomiting.  MSK:as above    Objective      Past Medical History  History reviewed. No pertinent past medical history.      Physical Exam  Pulse 83   Ht 160 cm (62.99\")   Wt 77.2 kg (170 lb 3.1 oz)   SpO2 98%   BMI 30.16 kg/m²     Body mass index is 30.16 kg/m².    Patient is well nourished and well developed.        Ortho Exam  Peripheral Vascular:    Upper Extremity:   Inspection:  Left--no cyanotic nail beds Right--no cyanotic nail beds   Bilateral:  Pink nail beds with brisk capillary refill   Palpation:  Bilateral radial pulse normal    Musculoskeletal:  Global Assessment:  Overall assessment of Lower Extremity Muscle Strength and Tone:  Left quadriceps--5/5   Left hamstrings--5/5       Left tibialis anterior--5/5  Left gastroc-soleus--5/5  Left EHL --5/5    Lower Extremity:  Knee/Patella:  No digital clubbing or cyanosis.    Examination of left knee reveals:  Normal deep tendon reflexes, coordination, strength, tone, sensation.  No known fractures or deformities.    Inspection and Palpation:  Left knee:  Tenderness:  Over the medial joint line and moderate severity  Effusion:  1+  Crepitus:  Positive  Pulses:  2+  Ecchymosis:  None  Warmth:  None     ROM:  Right:  " Extension: 5    Flexion: 120   Left:  Extension: 5     Flexion:120    Instability:    Left:  Lachman Test:  Negative, Varus stress test negative, Valgus stress test negative    Deformities/Malalignments/Discrepancies:    Left:  Genu Varum   Right:  No deformity    Functional Testing:  Vineet's test:  Negative  Patella grind test:  Positive  Q-angle:  normal      Imaging/Labs/EMG Reviewed:  X-rays of the patient's left knee show end-stage patellofemoral arthritis with early moderate medial lateral compartment degenerative changes.    Assessment:  1. Primary osteoarthritis of left knee        Plan:  1. Recommend over the counter anti-inflammatories for pain and/or swelling  2. Osteoarthritis left knee--we have had a lengthy discussion with the patient today regarding treatment options and alternatives.  At this point, the patient has exhausted nonoperative treatment modalities and I believe would do well with left total knee arthroplasty surgery.  The risk, benefits, potential hazards, typical recovery and rehab as well as reasonable alternatives were discussed with her this afternoon.  She had the opportunity to ask questions and wishes to proceed with scheduling.  We will get her to Claude for scheduling.      Tristan Gonzalez MD  07/29/19  1:29 PM

## 2019-08-08 ENCOUNTER — APPOINTMENT (OUTPATIENT)
Dept: PREADMISSION TESTING | Facility: HOSPITAL | Age: 79
End: 2019-08-08

## 2019-08-08 VITALS — WEIGHT: 169.8 LBS | HEIGHT: 66 IN | BODY MASS INDEX: 27.29 KG/M2

## 2019-08-08 DIAGNOSIS — M17.12 PRIMARY OSTEOARTHRITIS OF LEFT KNEE: ICD-10-CM

## 2019-08-08 LAB
ANION GAP SERPL CALCULATED.3IONS-SCNC: 11 MMOL/L (ref 5–15)
BACTERIA UR QL AUTO: ABNORMAL /HPF
BACTERIA UR QL AUTO: ABNORMAL /HPF
BASOPHILS # BLD AUTO: 0.07 10*3/MM3 (ref 0–0.2)
BASOPHILS NFR BLD AUTO: 0.9 % (ref 0–1.5)
BILIRUB UR QL STRIP: NEGATIVE
BILIRUB UR QL STRIP: NEGATIVE
BUN BLD-MCNC: 11 MG/DL (ref 8–23)
BUN/CREAT SERPL: 12.6 (ref 7–25)
CALCIUM SPEC-SCNC: 9.6 MG/DL (ref 8.6–10.5)
CHLORIDE SERPL-SCNC: 106 MMOL/L (ref 98–107)
CLARITY UR: ABNORMAL
CLARITY UR: CLEAR
CO2 SERPL-SCNC: 24 MMOL/L (ref 22–29)
COLOR UR: YELLOW
COLOR UR: YELLOW
CREAT BLD-MCNC: 0.87 MG/DL (ref 0.57–1)
CRP SERPL-MCNC: 0.18 MG/DL (ref 0–0.5)
DEPRECATED RDW RBC AUTO: 45 FL (ref 37–54)
EOSINOPHIL # BLD AUTO: 0.21 10*3/MM3 (ref 0–0.4)
EOSINOPHIL NFR BLD AUTO: 2.7 % (ref 0.3–6.2)
ERYTHROCYTE [DISTWIDTH] IN BLOOD BY AUTOMATED COUNT: 12.8 % (ref 12.3–15.4)
ERYTHROCYTE [SEDIMENTATION RATE] IN BLOOD: 18 MM/HR (ref 0–30)
GFR SERPL CREATININE-BSD FRML MDRD: 63 ML/MIN/1.73
GLUCOSE BLD-MCNC: 91 MG/DL (ref 65–99)
GLUCOSE UR STRIP-MCNC: NEGATIVE MG/DL
GLUCOSE UR STRIP-MCNC: NEGATIVE MG/DL
HBA1C MFR BLD: 5.6 % (ref 4.8–5.6)
HCT VFR BLD AUTO: 41.4 % (ref 34–46.6)
HGB BLD-MCNC: 13.2 G/DL (ref 12–15.9)
HGB UR QL STRIP.AUTO: ABNORMAL
HGB UR QL STRIP.AUTO: ABNORMAL
HYALINE CASTS UR QL AUTO: ABNORMAL /LPF
HYALINE CASTS UR QL AUTO: ABNORMAL /LPF
IMM GRANULOCYTES # BLD AUTO: 0.03 10*3/MM3 (ref 0–0.05)
IMM GRANULOCYTES NFR BLD AUTO: 0.4 % (ref 0–0.5)
KETONES UR QL STRIP: NEGATIVE
KETONES UR QL STRIP: NEGATIVE
LEUKOCYTE ESTERASE UR QL STRIP.AUTO: ABNORMAL
LEUKOCYTE ESTERASE UR QL STRIP.AUTO: ABNORMAL
LYMPHOCYTES # BLD AUTO: 1.87 10*3/MM3 (ref 0.7–3.1)
LYMPHOCYTES NFR BLD AUTO: 23.9 % (ref 19.6–45.3)
MCH RBC QN AUTO: 30.3 PG (ref 26.6–33)
MCHC RBC AUTO-ENTMCNC: 31.9 G/DL (ref 31.5–35.7)
MCV RBC AUTO: 95 FL (ref 79–97)
MONOCYTES # BLD AUTO: 0.65 10*3/MM3 (ref 0.1–0.9)
MONOCYTES NFR BLD AUTO: 8.3 % (ref 5–12)
NEUTROPHILS # BLD AUTO: 4.99 10*3/MM3 (ref 1.7–7)
NEUTROPHILS NFR BLD AUTO: 63.8 % (ref 42.7–76)
NITRITE UR QL STRIP: NEGATIVE
NITRITE UR QL STRIP: NEGATIVE
NRBC BLD AUTO-RTO: 0 /100 WBC (ref 0–0.2)
PH UR STRIP.AUTO: <=5 [PH] (ref 5–8)
PH UR STRIP.AUTO: <=5 [PH] (ref 5–8)
PLATELET # BLD AUTO: 328 10*3/MM3 (ref 140–450)
PMV BLD AUTO: 9.5 FL (ref 6–12)
POTASSIUM BLD-SCNC: 4.8 MMOL/L (ref 3.5–5.2)
PROT UR QL STRIP: NEGATIVE
PROT UR QL STRIP: NEGATIVE
RBC # BLD AUTO: 4.36 10*6/MM3 (ref 3.77–5.28)
RBC # UR: ABNORMAL /HPF
RBC # UR: ABNORMAL /HPF
REF LAB TEST METHOD: ABNORMAL
REF LAB TEST METHOD: ABNORMAL
SODIUM BLD-SCNC: 141 MMOL/L (ref 136–145)
SP GR UR STRIP: 1.01 (ref 1–1.03)
SP GR UR STRIP: 1.01 (ref 1–1.03)
SQUAMOUS #/AREA URNS HPF: ABNORMAL /HPF
SQUAMOUS #/AREA URNS HPF: ABNORMAL /HPF
UROBILINOGEN UR QL STRIP: ABNORMAL
UROBILINOGEN UR QL STRIP: ABNORMAL
WBC NRBC COR # BLD: 7.82 10*3/MM3 (ref 3.4–10.8)
WBC UR QL AUTO: ABNORMAL /HPF
WBC UR QL AUTO: ABNORMAL /HPF

## 2019-08-08 PROCEDURE — 85025 COMPLETE CBC W/AUTO DIFF WBC: CPT | Performed by: ORTHOPAEDIC SURGERY

## 2019-08-08 PROCEDURE — 83036 HEMOGLOBIN GLYCOSYLATED A1C: CPT | Performed by: ORTHOPAEDIC SURGERY

## 2019-08-08 PROCEDURE — 87086 URINE CULTURE/COLONY COUNT: CPT | Performed by: ORTHOPAEDIC SURGERY

## 2019-08-08 PROCEDURE — 87186 SC STD MICRODIL/AGAR DIL: CPT | Performed by: ORTHOPAEDIC SURGERY

## 2019-08-08 PROCEDURE — 86140 C-REACTIVE PROTEIN: CPT | Performed by: ORTHOPAEDIC SURGERY

## 2019-08-08 PROCEDURE — 81001 URINALYSIS AUTO W/SCOPE: CPT | Performed by: ORTHOPAEDIC SURGERY

## 2019-08-08 PROCEDURE — 36415 COLL VENOUS BLD VENIPUNCTURE: CPT

## 2019-08-08 PROCEDURE — 85652 RBC SED RATE AUTOMATED: CPT | Performed by: ORTHOPAEDIC SURGERY

## 2019-08-08 PROCEDURE — 80048 BASIC METABOLIC PNL TOTAL CA: CPT | Performed by: ORTHOPAEDIC SURGERY

## 2019-08-08 RX ORDER — FEXOFENADINE HCL 180 MG/1
180 TABLET ORAL DAILY
COMMUNITY

## 2019-08-08 RX ORDER — AZITHROMYCIN 250 MG/1
250 TABLET, FILM COATED ORAL DAILY
Status: ON HOLD | COMMUNITY
End: 2019-08-21

## 2019-08-08 RX ORDER — OMEPRAZOLE 20 MG/1
20 CAPSULE, DELAYED RELEASE ORAL DAILY
COMMUNITY

## 2019-08-08 ASSESSMENT — KOOS JR
KOOS JR SCORE: 24.875
KOOS JR SCORE: 24

## 2019-08-09 ENCOUNTER — TELEPHONE (OUTPATIENT)
Dept: ORTHOPEDIC SURGERY | Facility: CLINIC | Age: 79
End: 2019-08-09

## 2019-08-09 NOTE — TELEPHONE ENCOUNTER
CALLED PT LEFT MESSAGE CONCERNING HER PAT U/A RESULTS. DR SALEH WOULD LIKE FOR HER TO FOLLOW UP W/PCP AND GET STARTED ON SOME ANTIBIOTICS AND REPEAT U/A THE DAY BEFORE SX.

## 2019-08-10 LAB — BACTERIA SPEC AEROBE CULT: ABNORMAL

## 2019-08-19 NOTE — PROGRESS NOTES
"    Pre Op Ortho Assessment    [facility]     Patient Name: Eli Clayton  MRN: [id number]  Today's Date: 8/19/2019        PRE-OPERATIVE ORTHOPEDIC ASSESSMENT     Row Name 08/19/19 1444       Question    What is your age group?  0    Gender?  1    How far on average can you walk? (a block is 200 meters)  1    Which gait aid do you use? (more often than not)  2    Do you use community supports: (home help, meals on wheels, district nursing)  1    Will you live with someone who can care for you after your operation?  3    Your Score (out of 12)  8       Discharge Disposition/Planning:    Discussed the predicted discharge disposition needed based on RAPT Assessment with the patient  No    Patient Selected Discharge Disposition:  Home Health    Home Health Services Preferred:  -- no preference    Post-operative Caregiver Name and Phone Number  Child       Subacute Inpatient Rehabilitation: Complete this section only if planning inpatient services at a sub-acute facility.     Subacute Facility Preferred  -- n/a,plan is home    Requires pre-certification for inpatient rehabilitation services?  No    Planned source of transportation to inpatient rehabilitation facility?  -- n/a    If choosing inpatient services at an Acute or Subacute Facility please list a subsequent back-up plan (in case the patient fails to qualify for inpatient rehabilitation).  -- n/a       Home Equipment    Does patient have a walker for home use?  Yes    Does patient have a 3 in 1 commode for home use?  No    Does patient have a shower chair for home use?  No    Does patient have an elevated commode seat for home use?  Yes has higher toilet \"comfort height\" installed    Does patient have a cane for home use?  Yes    Is there any other medical equipment in the home?  No       Pre-Operative Class Attendance    Attended or scheduled to attend the pre-operative class within 1 year of total joint replacement?  Yes attended on 8/8/19       Patient " Education    Expected time of discharge discussed  Yes    Encouraged to attend pre-operative class  Yes    Education regarding predicted discharge disposition based on RAPT score  No    Patient receptive and voiced understanding of information given  Yes      I spoke with Ms Clayton by phone to discuss d/c plan.She lives alone in an apt. She does not use walker or cane for mobility. Her plan is to go to her daughter's home. She plans to use Home health, no preference, I provided the names of both agencies in her county. (She had a right TKR in 2009,went to [facility] or 2 weeks). We discussed Medicare guidelines that classify some TKR's as overnite outpt observation, not sure she understood explanation, but she will plan to go home in 1-2 days. No other questions verbalized.      Sonja C Kellerman, RN

## 2019-08-20 ENCOUNTER — APPOINTMENT (OUTPATIENT)
Dept: PREADMISSION TESTING | Facility: HOSPITAL | Age: 79
End: 2019-08-20

## 2019-08-20 ENCOUNTER — ANESTHESIA EVENT (OUTPATIENT)
Dept: PERIOP | Facility: HOSPITAL | Age: 79
End: 2019-08-20

## 2019-08-20 LAB
BACTERIA UR QL AUTO: NORMAL /HPF
BILIRUB UR QL STRIP: NEGATIVE
CLARITY UR: CLEAR
COLOR UR: YELLOW
GLUCOSE UR STRIP-MCNC: NEGATIVE MG/DL
HGB UR QL STRIP.AUTO: NEGATIVE
HYALINE CASTS UR QL AUTO: NORMAL /LPF
KETONES UR QL STRIP: NEGATIVE
LEUKOCYTE ESTERASE UR QL STRIP.AUTO: NEGATIVE
NITRITE UR QL STRIP: NEGATIVE
PH UR STRIP.AUTO: 6 [PH] (ref 5–8)
PROT UR QL STRIP: NEGATIVE
RBC # UR: NORMAL /HPF
REF LAB TEST METHOD: NORMAL
SP GR UR STRIP: 1.01 (ref 1–1.03)
SQUAMOUS #/AREA URNS HPF: NORMAL /HPF
UROBILINOGEN UR QL STRIP: NORMAL
WBC UR QL AUTO: NORMAL /HPF

## 2019-08-20 PROCEDURE — 81001 URINALYSIS AUTO W/SCOPE: CPT | Performed by: ORTHOPAEDIC SURGERY

## 2019-08-20 RX ORDER — FAMOTIDINE 10 MG/ML
20 INJECTION, SOLUTION INTRAVENOUS ONCE
Status: CANCELLED | OUTPATIENT
Start: 2019-08-20 | End: 2019-08-20

## 2019-08-21 ENCOUNTER — HOSPITAL ENCOUNTER (OUTPATIENT)
Facility: HOSPITAL | Age: 79
Discharge: HOME HEALTH | End: 2019-08-23
Attending: ORTHOPAEDIC SURGERY | Admitting: ORTHOPAEDIC SURGERY

## 2019-08-21 ENCOUNTER — APPOINTMENT (OUTPATIENT)
Dept: GENERAL RADIOLOGY | Facility: HOSPITAL | Age: 79
End: 2019-08-21

## 2019-08-21 ENCOUNTER — ANESTHESIA (OUTPATIENT)
Dept: PERIOP | Facility: HOSPITAL | Age: 79
End: 2019-08-21

## 2019-08-21 DIAGNOSIS — M17.12 PRIMARY OSTEOARTHRITIS OF LEFT KNEE: ICD-10-CM

## 2019-08-21 DIAGNOSIS — Z78.9 IMPAIRED MOBILITY AND ADLS: ICD-10-CM

## 2019-08-21 DIAGNOSIS — Z74.09 IMPAIRED MOBILITY AND ADLS: ICD-10-CM

## 2019-08-21 DIAGNOSIS — Z74.09 IMPAIRED FUNCTIONAL MOBILITY, BALANCE, GAIT, AND ENDURANCE: Primary | ICD-10-CM

## 2019-08-21 PROBLEM — Z96.652 STATUS POST TOTAL LEFT KNEE REPLACEMENT: Status: ACTIVE | Noted: 2019-08-21

## 2019-08-21 PROBLEM — E03.9 HYPOTHYROID: Status: ACTIVE | Noted: 2019-08-21

## 2019-08-21 LAB — GLUCOSE BLDC GLUCOMTR-MCNC: 81 MG/DL (ref 70–130)

## 2019-08-21 PROCEDURE — 73560 X-RAY EXAM OF KNEE 1 OR 2: CPT

## 2019-08-21 PROCEDURE — 25010000002 MORPHINE PER 10 MG: Performed by: ORTHOPAEDIC SURGERY

## 2019-08-21 PROCEDURE — 25010000002 DEXAMETHASONE PER 1 MG: Performed by: NURSE ANESTHETIST, CERTIFIED REGISTERED

## 2019-08-21 PROCEDURE — A9270 NON-COVERED ITEM OR SERVICE: HCPCS | Performed by: NURSE PRACTITIONER

## 2019-08-21 PROCEDURE — 25810000003 SODIUM CHLORIDE 0.9 % WITH KCL 20 MEQ 20-0.9 MEQ/L-% SOLUTION: Performed by: ORTHOPAEDIC SURGERY

## 2019-08-21 PROCEDURE — 25010000003 CEFAZOLIN IN DEXTROSE 2-4 GM/100ML-% SOLUTION: Performed by: ORTHOPAEDIC SURGERY

## 2019-08-21 PROCEDURE — 93005 ELECTROCARDIOGRAM TRACING: CPT | Performed by: ANESTHESIOLOGY

## 2019-08-21 PROCEDURE — 25010000002 PROPOFOL 10 MG/ML EMULSION: Performed by: NURSE ANESTHETIST, CERTIFIED REGISTERED

## 2019-08-21 PROCEDURE — 25010000002 ONDANSETRON PER 1 MG: Performed by: NURSE ANESTHETIST, CERTIFIED REGISTERED

## 2019-08-21 PROCEDURE — 63710000001 OXYCODONE-ACETAMINOPHEN 5-325 MG TABLET: Performed by: ORTHOPAEDIC SURGERY

## 2019-08-21 PROCEDURE — 25010000002 KETOROLAC TROMETHAMINE PER 15 MG: Performed by: ORTHOPAEDIC SURGERY

## 2019-08-21 PROCEDURE — 97162 PT EVAL MOD COMPLEX 30 MIN: CPT

## 2019-08-21 PROCEDURE — 63710000001 PANTOPRAZOLE 40 MG TABLET DELAYED-RELEASE: Performed by: NURSE PRACTITIONER

## 2019-08-21 PROCEDURE — 63710000001 OXYCODONE 10 MG TABLET EXTENDED-RELEASE 12 HOUR: Performed by: ORTHOPAEDIC SURGERY

## 2019-08-21 PROCEDURE — 25010000002 FENTANYL CITRATE (PF) 100 MCG/2ML SOLUTION: Performed by: NURSE ANESTHETIST, CERTIFIED REGISTERED

## 2019-08-21 PROCEDURE — C1776 JOINT DEVICE (IMPLANTABLE): HCPCS | Performed by: ORTHOPAEDIC SURGERY

## 2019-08-21 PROCEDURE — 63710000001 PAROXETINE 20 MG TABLET: Performed by: NURSE PRACTITIONER

## 2019-08-21 PROCEDURE — 63710000001 FAMOTIDINE 20 MG TABLET: Performed by: ANESTHESIOLOGY

## 2019-08-21 PROCEDURE — A9270 NON-COVERED ITEM OR SERVICE: HCPCS | Performed by: ORTHOPAEDIC SURGERY

## 2019-08-21 PROCEDURE — 63710000001 POVIDONE-IODINE 10 % SOLUTION 30 ML BOTTLE: Performed by: ORTHOPAEDIC SURGERY

## 2019-08-21 PROCEDURE — C1713 ANCHOR/SCREW BN/BN,TIS/BN: HCPCS | Performed by: ORTHOPAEDIC SURGERY

## 2019-08-21 PROCEDURE — 63710000001 ACETAMINOPHEN 500 MG TABLET: Performed by: ORTHOPAEDIC SURGERY

## 2019-08-21 PROCEDURE — 25010000002 ROPIVACAINE PER 1 MG: Performed by: NURSE ANESTHETIST, CERTIFIED REGISTERED

## 2019-08-21 PROCEDURE — A9270 NON-COVERED ITEM OR SERVICE: HCPCS | Performed by: ANESTHESIOLOGY

## 2019-08-21 PROCEDURE — 25010000002 ONDANSETRON PER 1 MG: Performed by: ORTHOPAEDIC SURGERY

## 2019-08-21 PROCEDURE — 63710000001 LEVOTHYROXINE 75 MCG TABLET: Performed by: NURSE PRACTITIONER

## 2019-08-21 PROCEDURE — 27447 TOTAL KNEE ARTHROPLASTY: CPT | Performed by: PHYSICIAN ASSISTANT

## 2019-08-21 PROCEDURE — 63710000001 MUPIROCIN 2 % OINTMENT: Performed by: ORTHOPAEDIC SURGERY

## 2019-08-21 PROCEDURE — 25010000002 ROPIVACAINE PER 1 MG: Performed by: ORTHOPAEDIC SURGERY

## 2019-08-21 PROCEDURE — 82962 GLUCOSE BLOOD TEST: CPT

## 2019-08-21 PROCEDURE — 93010 ELECTROCARDIOGRAM REPORT: CPT | Performed by: INTERNAL MEDICINE

## 2019-08-21 PROCEDURE — 27447 TOTAL KNEE ARTHROPLASTY: CPT | Performed by: ORTHOPAEDIC SURGERY

## 2019-08-21 PROCEDURE — 25010000002 PROPOFOL 1000 MG/ML EMULSION: Performed by: NURSE ANESTHETIST, CERTIFIED REGISTERED

## 2019-08-21 DEVICE — CAP TOTL KN CMT PREMIUM: Type: IMPLANTABLE DEVICE | Site: KNEE | Status: FUNCTIONAL

## 2019-08-21 DEVICE — IMPLANTABLE DEVICE: Type: IMPLANTABLE DEVICE | Site: KNEE | Status: FUNCTIONAL

## 2019-08-21 DEVICE — CMT BONE R 1X40: Type: IMPLANTABLE DEVICE | Site: KNEE | Status: FUNCTIONAL

## 2019-08-21 DEVICE — ART/SRF KN PERSONA/VE PS CD/CR 4TO5 11MM LT: Type: IMPLANTABLE DEVICE | Site: KNEE | Status: FUNCTIONAL

## 2019-08-21 DEVICE — COMP FEM/KN PERSONA CR CMT COCR STD SZ4 LT: Type: IMPLANTABLE DEVICE | Site: KNEE | Status: FUNCTIONAL

## 2019-08-21 DEVICE — STEM TIB/KN PERSONA CMT 5D SZD LT: Type: IMPLANTABLE DEVICE | Site: KNEE | Status: FUNCTIONAL

## 2019-08-21 DEVICE — CAP BEAR KN VE UPCHRG: Type: IMPLANTABLE DEVICE | Site: KNEE | Status: FUNCTIONAL

## 2019-08-21 RX ORDER — PAROXETINE HYDROCHLORIDE 20 MG/1
20 TABLET, FILM COATED ORAL EVERY MORNING
Status: DISCONTINUED | OUTPATIENT
Start: 2019-08-21 | End: 2019-08-23 | Stop reason: HOSPADM

## 2019-08-21 RX ORDER — SODIUM CHLORIDE AND POTASSIUM CHLORIDE 150; 900 MG/100ML; MG/100ML
50 INJECTION, SOLUTION INTRAVENOUS CONTINUOUS
Status: DISCONTINUED | OUTPATIENT
Start: 2019-08-21 | End: 2019-08-23 | Stop reason: HOSPADM

## 2019-08-21 RX ORDER — DOCUSATE SODIUM 100 MG/1
100 CAPSULE, LIQUID FILLED ORAL 2 TIMES DAILY PRN
Status: DISCONTINUED | OUTPATIENT
Start: 2019-08-21 | End: 2019-08-23 | Stop reason: HOSPADM

## 2019-08-21 RX ORDER — ACETAMINOPHEN 325 MG/1
650 TABLET ORAL ONCE AS NEEDED
Status: DISCONTINUED | OUTPATIENT
Start: 2019-08-21 | End: 2019-08-21 | Stop reason: HOSPADM

## 2019-08-21 RX ORDER — PROMETHAZINE HYDROCHLORIDE 25 MG/ML
6.25 INJECTION, SOLUTION INTRAMUSCULAR; INTRAVENOUS ONCE AS NEEDED
Status: DISCONTINUED | OUTPATIENT
Start: 2019-08-21 | End: 2019-08-21 | Stop reason: HOSPADM

## 2019-08-21 RX ORDER — LIDOCAINE HYDROCHLORIDE 10 MG/ML
0.5 INJECTION, SOLUTION EPIDURAL; INFILTRATION; INTRACAUDAL; PERINEURAL ONCE AS NEEDED
Status: COMPLETED | OUTPATIENT
Start: 2019-08-21 | End: 2019-08-21

## 2019-08-21 RX ORDER — SODIUM CHLORIDE 0.9 % (FLUSH) 0.9 %
3 SYRINGE (ML) INJECTION EVERY 12 HOURS SCHEDULED
Status: DISCONTINUED | OUTPATIENT
Start: 2019-08-21 | End: 2019-08-23 | Stop reason: HOSPADM

## 2019-08-21 RX ORDER — CEFAZOLIN SODIUM 2 G/100ML
2 INJECTION, SOLUTION INTRAVENOUS EVERY 8 HOURS
Status: COMPLETED | OUTPATIENT
Start: 2019-08-21 | End: 2019-08-22

## 2019-08-21 RX ORDER — CEFAZOLIN SODIUM 2 G/100ML
2 INJECTION, SOLUTION INTRAVENOUS ONCE
Status: COMPLETED | OUTPATIENT
Start: 2019-08-21 | End: 2019-08-21

## 2019-08-21 RX ORDER — SODIUM CHLORIDE, SODIUM LACTATE, POTASSIUM CHLORIDE, CALCIUM CHLORIDE 600; 310; 30; 20 MG/100ML; MG/100ML; MG/100ML; MG/100ML
9 INJECTION, SOLUTION INTRAVENOUS CONTINUOUS
Status: DISCONTINUED | OUTPATIENT
Start: 2019-08-21 | End: 2019-08-23 | Stop reason: HOSPADM

## 2019-08-21 RX ORDER — MAGNESIUM HYDROXIDE 1200 MG/15ML
LIQUID ORAL AS NEEDED
Status: DISCONTINUED | OUTPATIENT
Start: 2019-08-21 | End: 2019-08-21 | Stop reason: HOSPADM

## 2019-08-21 RX ORDER — ONDANSETRON 2 MG/ML
INJECTION INTRAMUSCULAR; INTRAVENOUS AS NEEDED
Status: DISCONTINUED | OUTPATIENT
Start: 2019-08-21 | End: 2019-08-21 | Stop reason: SURG

## 2019-08-21 RX ORDER — HYDROMORPHONE HYDROCHLORIDE 1 MG/ML
0.5 INJECTION, SOLUTION INTRAMUSCULAR; INTRAVENOUS; SUBCUTANEOUS
Status: DISCONTINUED | OUTPATIENT
Start: 2019-08-21 | End: 2019-08-21 | Stop reason: HOSPADM

## 2019-08-21 RX ORDER — NALOXONE HCL 0.4 MG/ML
0.4 VIAL (ML) INJECTION
Status: DISCONTINUED | OUTPATIENT
Start: 2019-08-21 | End: 2019-08-23 | Stop reason: HOSPADM

## 2019-08-21 RX ORDER — ONDANSETRON 2 MG/ML
4 INJECTION INTRAMUSCULAR; INTRAVENOUS ONCE AS NEEDED
Status: DISCONTINUED | OUTPATIENT
Start: 2019-08-21 | End: 2019-08-21 | Stop reason: HOSPADM

## 2019-08-21 RX ORDER — ONDANSETRON 4 MG/1
4 TABLET, FILM COATED ORAL EVERY 6 HOURS PRN
Status: DISCONTINUED | OUTPATIENT
Start: 2019-08-21 | End: 2019-08-23 | Stop reason: HOSPADM

## 2019-08-21 RX ORDER — PROMETHAZINE HYDROCHLORIDE 25 MG/1
25 TABLET ORAL ONCE AS NEEDED
Status: DISCONTINUED | OUTPATIENT
Start: 2019-08-21 | End: 2019-08-21 | Stop reason: HOSPADM

## 2019-08-21 RX ORDER — SODIUM CHLORIDE 0.9 % (FLUSH) 0.9 %
3-10 SYRINGE (ML) INJECTION AS NEEDED
Status: DISCONTINUED | OUTPATIENT
Start: 2019-08-21 | End: 2019-08-23 | Stop reason: HOSPADM

## 2019-08-21 RX ORDER — FENTANYL CITRATE 50 UG/ML
INJECTION, SOLUTION INTRAMUSCULAR; INTRAVENOUS AS NEEDED
Status: DISCONTINUED | OUTPATIENT
Start: 2019-08-21 | End: 2019-08-21 | Stop reason: SURG

## 2019-08-21 RX ORDER — IPRATROPIUM BROMIDE AND ALBUTEROL SULFATE 2.5; .5 MG/3ML; MG/3ML
3 SOLUTION RESPIRATORY (INHALATION) ONCE AS NEEDED
Status: DISCONTINUED | OUTPATIENT
Start: 2019-08-21 | End: 2019-08-21 | Stop reason: HOSPADM

## 2019-08-21 RX ORDER — FENTANYL CITRATE 50 UG/ML
50 INJECTION, SOLUTION INTRAMUSCULAR; INTRAVENOUS
Status: DISCONTINUED | OUTPATIENT
Start: 2019-08-21 | End: 2019-08-21 | Stop reason: HOSPADM

## 2019-08-21 RX ORDER — OXYCODONE AND ACETAMINOPHEN 7.5; 325 MG/1; MG/1
2 TABLET ORAL EVERY 4 HOURS PRN
Status: DISCONTINUED | OUTPATIENT
Start: 2019-08-21 | End: 2019-08-21 | Stop reason: HOSPADM

## 2019-08-21 RX ORDER — SODIUM CHLORIDE 0.9 % (FLUSH) 0.9 %
3-10 SYRINGE (ML) INJECTION AS NEEDED
Status: DISCONTINUED | OUTPATIENT
Start: 2019-08-21 | End: 2019-08-21 | Stop reason: HOSPADM

## 2019-08-21 RX ORDER — BUPIVACAINE HYDROCHLORIDE 2.5 MG/ML
INJECTION, SOLUTION EPIDURAL; INFILTRATION; INTRACAUDAL
Status: DISCONTINUED | OUTPATIENT
Start: 2019-08-21 | End: 2019-08-21 | Stop reason: SURG

## 2019-08-21 RX ORDER — FAMOTIDINE 20 MG/1
20 TABLET, FILM COATED ORAL ONCE
Status: COMPLETED | OUTPATIENT
Start: 2019-08-21 | End: 2019-08-21

## 2019-08-21 RX ORDER — MORPHINE SULFATE 4 MG/ML
4 INJECTION, SOLUTION INTRAMUSCULAR; INTRAVENOUS
Status: DISCONTINUED | OUTPATIENT
Start: 2019-08-21 | End: 2019-08-23 | Stop reason: HOSPADM

## 2019-08-21 RX ORDER — LEVOTHYROXINE SODIUM 0.07 MG/1
75 TABLET ORAL
Status: DISCONTINUED | OUTPATIENT
Start: 2019-08-21 | End: 2019-08-23 | Stop reason: HOSPADM

## 2019-08-21 RX ORDER — ACETAMINOPHEN 650 MG/1
650 SUPPOSITORY RECTAL ONCE AS NEEDED
Status: DISCONTINUED | OUTPATIENT
Start: 2019-08-21 | End: 2019-08-21 | Stop reason: HOSPADM

## 2019-08-21 RX ORDER — DEXAMETHASONE SODIUM PHOSPHATE 4 MG/ML
INJECTION, SOLUTION INTRA-ARTICULAR; INTRALESIONAL; INTRAMUSCULAR; INTRAVENOUS; SOFT TISSUE AS NEEDED
Status: DISCONTINUED | OUTPATIENT
Start: 2019-08-21 | End: 2019-08-21 | Stop reason: SURG

## 2019-08-21 RX ORDER — ACETAMINOPHEN 500 MG
1000 TABLET ORAL ONCE
Status: COMPLETED | OUTPATIENT
Start: 2019-08-21 | End: 2019-08-21

## 2019-08-21 RX ORDER — PROMETHAZINE HYDROCHLORIDE 25 MG/1
25 SUPPOSITORY RECTAL ONCE AS NEEDED
Status: DISCONTINUED | OUTPATIENT
Start: 2019-08-21 | End: 2019-08-21 | Stop reason: HOSPADM

## 2019-08-21 RX ORDER — OXYCODONE HYDROCHLORIDE AND ACETAMINOPHEN 5; 325 MG/1; MG/1
1 TABLET ORAL ONCE AS NEEDED
Status: DISCONTINUED | OUTPATIENT
Start: 2019-08-21 | End: 2019-08-21 | Stop reason: HOSPADM

## 2019-08-21 RX ORDER — BUPIVACAINE HYDROCHLORIDE 5 MG/ML
INJECTION, SOLUTION PERINEURAL
Status: COMPLETED | OUTPATIENT
Start: 2019-08-21 | End: 2019-08-21

## 2019-08-21 RX ORDER — ONDANSETRON 2 MG/ML
4 INJECTION INTRAMUSCULAR; INTRAVENOUS EVERY 6 HOURS PRN
Status: DISCONTINUED | OUTPATIENT
Start: 2019-08-21 | End: 2019-08-23 | Stop reason: HOSPADM

## 2019-08-21 RX ORDER — PROPOFOL 10 MG/ML
VIAL (ML) INTRAVENOUS AS NEEDED
Status: DISCONTINUED | OUTPATIENT
Start: 2019-08-21 | End: 2019-08-21 | Stop reason: SURG

## 2019-08-21 RX ORDER — IBUPROFEN 600 MG/1
600 TABLET ORAL ONCE AS NEEDED
Status: DISCONTINUED | OUTPATIENT
Start: 2019-08-21 | End: 2019-08-21

## 2019-08-21 RX ORDER — LIDOCAINE HYDROCHLORIDE 10 MG/ML
INJECTION, SOLUTION EPIDURAL; INFILTRATION; INTRACAUDAL; PERINEURAL AS NEEDED
Status: DISCONTINUED | OUTPATIENT
Start: 2019-08-21 | End: 2019-08-21 | Stop reason: SURG

## 2019-08-21 RX ORDER — PANTOPRAZOLE SODIUM 40 MG/1
40 TABLET, DELAYED RELEASE ORAL EVERY MORNING
Status: DISCONTINUED | OUTPATIENT
Start: 2019-08-21 | End: 2019-08-23 | Stop reason: HOSPADM

## 2019-08-21 RX ORDER — SODIUM CHLORIDE 0.9 % (FLUSH) 0.9 %
3 SYRINGE (ML) INJECTION EVERY 12 HOURS SCHEDULED
Status: DISCONTINUED | OUTPATIENT
Start: 2019-08-21 | End: 2019-08-21 | Stop reason: HOSPADM

## 2019-08-21 RX ORDER — BISACODYL 10 MG
10 SUPPOSITORY, RECTAL RECTAL DAILY PRN
Status: DISCONTINUED | OUTPATIENT
Start: 2019-08-21 | End: 2019-08-23 | Stop reason: HOSPADM

## 2019-08-21 RX ORDER — OXYCODONE HYDROCHLORIDE AND ACETAMINOPHEN 5; 325 MG/1; MG/1
1 TABLET ORAL EVERY 4 HOURS PRN
Status: DISCONTINUED | OUTPATIENT
Start: 2019-08-21 | End: 2019-08-23 | Stop reason: HOSPADM

## 2019-08-21 RX ORDER — OXYCODONE HCL 10 MG/1
10 TABLET, FILM COATED, EXTENDED RELEASE ORAL ONCE
Status: COMPLETED | OUTPATIENT
Start: 2019-08-21 | End: 2019-08-21

## 2019-08-21 RX ADMIN — ONDANSETRON 4 MG: 2 INJECTION INTRAMUSCULAR; INTRAVENOUS at 09:55

## 2019-08-21 RX ADMIN — LEVOTHYROXINE SODIUM 75 MCG: 75 TABLET ORAL at 16:58

## 2019-08-21 RX ADMIN — TRANEXAMIC ACID 1000 MG: 100 INJECTION, SOLUTION INTRAVENOUS at 07:58

## 2019-08-21 RX ADMIN — PROPOFOL 50 MCG/KG/MIN: 10 INJECTION, EMULSION INTRAVENOUS at 07:50

## 2019-08-21 RX ADMIN — ACETAMINOPHEN 1000 MG: 500 TABLET ORAL at 06:22

## 2019-08-21 RX ADMIN — OXYCODONE HYDROCHLORIDE AND ACETAMINOPHEN 1 TABLET: 5; 325 TABLET ORAL at 20:59

## 2019-08-21 RX ADMIN — LIDOCAINE HYDROCHLORIDE 50 MG: 10 INJECTION, SOLUTION EPIDURAL; INFILTRATION; INTRACAUDAL; PERINEURAL at 07:51

## 2019-08-21 RX ADMIN — PANTOPRAZOLE SODIUM 40 MG: 40 TABLET, DELAYED RELEASE ORAL at 16:58

## 2019-08-21 RX ADMIN — LIDOCAINE HYDROCHLORIDE 0.5 ML: 10 INJECTION, SOLUTION EPIDURAL; INFILTRATION; INTRACAUDAL; PERINEURAL at 06:20

## 2019-08-21 RX ADMIN — POTASSIUM CHLORIDE AND SODIUM CHLORIDE 50 ML/HR: 900; 150 INJECTION, SOLUTION INTRAVENOUS at 11:28

## 2019-08-21 RX ADMIN — OXYCODONE HYDROCHLORIDE AND ACETAMINOPHEN 1 TABLET: 5; 325 TABLET ORAL at 15:18

## 2019-08-21 RX ADMIN — FENTANYL CITRATE 50 MCG: 50 INJECTION, SOLUTION INTRAMUSCULAR; INTRAVENOUS at 07:43

## 2019-08-21 RX ADMIN — ONDANSETRON 4 MG: 2 INJECTION INTRAMUSCULAR; INTRAVENOUS at 12:28

## 2019-08-21 RX ADMIN — PROPOFOL 50 MG: 10 INJECTION, EMULSION INTRAVENOUS at 07:51

## 2019-08-21 RX ADMIN — BUPIVACAINE HYDROCHLORIDE 20 ML: 2.5 INJECTION, SOLUTION EPIDURAL; INFILTRATION; INTRACAUDAL; PERINEURAL at 11:10

## 2019-08-21 RX ADMIN — CEFAZOLIN SODIUM 2 G: 2 INJECTION, SOLUTION INTRAVENOUS at 16:58

## 2019-08-21 RX ADMIN — CEFAZOLIN SODIUM 2 G: 2 INJECTION, SOLUTION INTRAVENOUS at 07:43

## 2019-08-21 RX ADMIN — FAMOTIDINE 20 MG: 20 TABLET ORAL at 06:22

## 2019-08-21 RX ADMIN — BUPIVACAINE HYDROCHLORIDE 2 ML: 5 INJECTION, SOLUTION PERINEURAL at 07:50

## 2019-08-21 RX ADMIN — TRANEXAMIC ACID 1000 MG: 100 INJECTION, SOLUTION INTRAVENOUS at 09:34

## 2019-08-21 RX ADMIN — MUPIROCIN 1 APPLICATION: 20 OINTMENT TOPICAL at 06:22

## 2019-08-21 RX ADMIN — OXYCODONE HYDROCHLORIDE 10 MG: 10 TABLET, FILM COATED, EXTENDED RELEASE ORAL at 06:22

## 2019-08-21 RX ADMIN — SODIUM CHLORIDE, POTASSIUM CHLORIDE, SODIUM LACTATE AND CALCIUM CHLORIDE: 600; 310; 30; 20 INJECTION, SOLUTION INTRAVENOUS at 09:56

## 2019-08-21 RX ADMIN — PAROXETINE HYDROCHLORIDE 20 MG: 20 TABLET, FILM COATED ORAL at 16:58

## 2019-08-21 RX ADMIN — ROPIVACAINE HYDROCHLORIDE 10 ML/HR: 5 INJECTION, SOLUTION EPIDURAL; INFILTRATION; PERINEURAL at 11:07

## 2019-08-21 RX ADMIN — DEXAMETHASONE SODIUM PHOSPHATE 8 MG: 4 INJECTION, SOLUTION INTRAMUSCULAR; INTRAVENOUS at 08:09

## 2019-08-21 RX ADMIN — FENTANYL CITRATE 50 MCG: 50 INJECTION, SOLUTION INTRAMUSCULAR; INTRAVENOUS at 07:47

## 2019-08-21 RX ADMIN — SODIUM CHLORIDE, POTASSIUM CHLORIDE, SODIUM LACTATE AND CALCIUM CHLORIDE 9 ML/HR: 600; 310; 30; 20 INJECTION, SOLUTION INTRAVENOUS at 06:20

## 2019-08-21 NOTE — H&P
Patient Name: Eli Clayton  MRN: [id number]  : 1940  DOS: 2019    Attending: Tristan Gonzalez,*    Primary Care Provider: Heri Centeno MD      Chief complaint:  Left knee pain    Subjective   Patient is a 79 y.o. female presented for left total knee arthroplasty by Dr. Gonzalez.    She underwent surgery under spinal anesthesia. She tolerated surgery well and is admitted for further medical management. Her knee has been painful for about 2 years. She denies use of assistive device for ambulation. She has had recent falls.    When seen postop she is doing well. She is beginning to have some posterior knee pain. She denies nausea, shortness of breath or chest pain. No hx of DVT or PE.    (Above is noted, agree.  Seen in her room after surgery.  Doing fairly well.  Spinal anesthesia effects are starting to subside.)wy       Allergies:  Allergies   Allergen Reactions   • Codeine Nausea And Vomiting   • Latex Hives       Meds:  Medications Prior to Admission   Medication Sig Dispense Refill Last Dose   • aspirin 81 MG chewable tablet Chew 81 mg Daily.   2019   • fexofenadine (ALLEGRA) 180 MG tablet Take 180 mg by mouth Daily.   Past Week at Unknown time   • omeprazole (priLOSEC) 20 MG capsule Take 20 mg by mouth Daily.   2019 at 0800   • PARoxetine (PAXIL) 20 MG tablet Take 20 mg by mouth Every Morning.   2019 at 0800   • influenza vac split high-dose (FLUZONE HIGH DOSE) 0.5 ML suspension prefilled syringe injection Fluzone High-Dose 2011 (PF) 180 mcg/0.5 mL intramuscular syringe   1 (one) suspension intramuscular once a day   Unknown at Unknown time   • levothyroxine (SYNTHROID, LEVOTHROID) 75 MCG tablet Take 75 mcg by mouth Daily.   2019 at 0800       History:   Past Medical History:   Diagnosis Date   • Anesthesia complication     decreased oxygen and heart rate after knee surgery    • Arthritis     osteoarthritis   • Cancer (CMS/HCC)     cervical and skin   •  Disease of thyroid gland    • GERD (gastroesophageal reflux disease)    • Hard of hearing     has hearing aid but does not wear it    • History of transfusion    • Interstitial cystitis    • PONV (postoperative nausea and vomiting)    • Wears dentures    • Wears glasses      Past Surgical History:   Procedure Laterality Date   • COLONOSCOPY     • EYE SURGERY      cataracts removed    • HYSTERECTOMY     • KNEE SURGERY Right 2009    replacement    • SKIN BIOPSY       Family History   Problem Relation Age of Onset   • Diabetes Mother    • Hypertension Father    • Stroke Father    • Heart attack Father      Social History     Tobacco Use   • Smoking status: Never Smoker   • Smokeless tobacco: Never Used   Substance Use Topics   • Alcohol use: No   • Drug use: No   She lives alone and has 3 children. She retired yesterday from [employer].    Review of Systems  All systems were reviewed and negative except for:  Respiratory: positive for  EDOUARD  Gastrointestinal: positive for  diarrhea    Vital Signs  Visit Vitals  /71 (BP Location: Right arm, Patient Position: Lying)   Pulse 58   Temp 97.7 °F (36.5 °C) (Axillary)   Resp 16   SpO2 95%   Breastfeeding? No       Physical Exam:    General Appearance:    Alert, cooperative, in no acute distress   Head:    Normocephalic, without obvious abnormality, atraumatic   Eyes:            Lids and lashes normal, conjunctivae and sclerae normal, no   icterus, no pallor, corneas clear   Ears:    Ears appear intact with no abnormalities noted   Neck:   No adenopathy, supple, trachea midline, no thyromegaly   Lungs:     Clear to auscultation,respirations regular, even and                   unlabored    Heart:    Regular rhythm and normal rate, normal S1 and S2, no            murmur, no gallop   Abdomen:     Normal bowel sounds, no masses, no organomegaly, soft        non-tender, non-distended, no guarding, no rebound                 tenderness   Genitalia:    Deferred   Extremities:   Left  knee ACE wrap CDI. Nerve block present   Pulses:   Pulses palpable and equal bilaterally   Skin:   No bleeding, bruising or rash   Neurologic:   Cranial nerves 2 - 12 grossly intact, sensation intact. Flexion and dorsiflexion intact bilateral feet.   (Starting to have movement and sensation of the lower extremities when I saw her with subsiding spinal anesthesia effects)wy        I reviewed the patient's new clinical results.   Results for ALIS FRANK (MRN [id number]) as of 8/21/2019 15:53   Ref. Range 8/8/2019 11:33   Glucose Latest Ref Range: 65 - 99 mg/dL 91   Sodium Latest Ref Range: 136 - 145 mmol/L 141   Potassium Latest Ref Range: 3.5 - 5.2 mmol/L 4.8   CO2 Latest Ref Range: 22.0 - 29.0 mmol/L 24.0   Chloride Latest Ref Range: 98 - 107 mmol/L 106   Anion Gap Latest Ref Range: 5.0 - 15.0 mmol/L 11.0   Creatinine Latest Ref Range: 0.57 - 1.00 mg/dL 0.87   BUN Latest Ref Range: 8 - 23 mg/dL 11   BUN/Creatinine Ratio Latest Ref Range: 7.0 - 25.0  12.6   Calcium Latest Ref Range: 8.6 - 10.5 mg/dL 9.6   eGFR Non  Am Latest Ref Range: >60 mL/min/1.73 63   Hemoglobin A1C Latest Ref Range: 4.80 - 5.60 % 5.60   C-Reactive Protein Latest Ref Range: 0.00 - 0.50 mg/dL 0.18   WBC Latest Ref Range: 3.40 - 10.80 10*3/mm3 7.82   RBC Latest Ref Range: 3.77 - 5.28 10*6/mm3 4.36   Hemoglobin Latest Ref Range: 12.0 - 15.9 g/dL 13.2   Hematocrit Latest Ref Range: 34.0 - 46.6 % 41.4   RDW Latest Ref Range: 12.3 - 15.4 % 12.8   MCV Latest Ref Range: 79.0 - 97.0 fL 95.0   MCH Latest Ref Range: 26.6 - 33.0 pg 30.3   MCHC Latest Ref Range: 31.5 - 35.7 g/dL 31.9   MPV Latest Ref Range: 6.0 - 12.0 fL 9.5   Platelets Latest Ref Range: 140 - 450 10*3/mm3 328     Assessment and Plan:     Status post total left knee replacement    Primary osteoarthritis of left knee    Hypothyroid      Plan  1. PT/OT- early ambulation post op  2. Pain control-prns, AC nerve block  3. IS-encourage  4. DVT proph- mechs/Eliquis  5. Bowel regimen  6.  Resume home medications as appropriate  7. Monitor post-op labs  8. Discharge planning for home with daughter    Hypothyroid  - Continue home synthroid      ISMA Mckeon  08/21/19  4:21 PM     I have personally performed the evaluation on this patient. My history is consistent  with HPI obtained. My exam findings are listed above. I have personally reviewed and discussed the above formulated treatment plan with pt and [email or web address].

## 2019-08-21 NOTE — ANESTHESIA POSTPROCEDURE EVALUATION
Patient: Eli Clayton    Procedure Summary     Date:  08/21/19 [facility] / [facility]:   [facility]    Anesthesia Start:  0743 Anesthesia Stop:  1050    Procedure:  TOTAL KNEE ARTHROPLASTY LEFT (Left Knee) Diagnosis:       Primary osteoarthritis of left knee      (Primary osteoarthritis of left knee [M17.12])    Surgeon:  Tristan Gonzalez MD Provider:  Bhavya Celaya MD    Anesthesia Type:  spinal, regional ASA Status:  3          Anesthesia Type: spinal, regional  Last vitals  BP   109/78 (08/21/19 1100)   Temp   97.3 °F (36.3 °C) (08/21/19 1042)   Pulse   62 (08/21/19 1100)   Resp   16 (08/21/19 1100)     SpO2   97 % (08/21/19 1100)     Post Anesthesia Care and Evaluation    Patient location during evaluation: PACU  Patient participation: complete - patient participated  Level of consciousness: awake and alert  Pain score: 0  Pain management: adequate  Airway patency: patent  Anesthetic complications: No anesthetic complications  PONV Status: none  Cardiovascular status: hemodynamically stable and acceptable  Respiratory status: nonlabored ventilation, acceptable and nasal cannula  Hydration status: acceptable

## 2019-08-21 NOTE — OP NOTE
Orthopaedics Operative Report    PREOPERATIVE DIAGNOSIS: Left knee primary osteoarthritis     POSTOPERATIVE DIAGNOSIS: Same    PROCEDURE PERFORMED: Left total knee arthroplasty, CPT 05420    SURGEON: Tristan Gonzalez MD    ANESTHESIA:  Spinal with post-operative indwelling low femoral nerve block.  Intraoperative injection of 5ml of Morphine with 20ml of 0.5% ropivicaine given before closure.    ANESTHESIA STAFF:  [facility]    STAFF:  Circulator: Anna Hill RN  Scrub Person: Roe Biswas; Yassine Moore  Vendor Representative: Alex Barkley  Nursing Assistant: Kesha Medina  Assistant: Danielle Mahajan PA-C  Other: Nieves Montes RN    TOURNIQUET TIME: 92 min    ESTIMATED BLOOD LOSS: 50 mL    COMPLICATIONS: None apparent.    RELEASES:None required after bone cuts made    IMPLANTS:   Sienna Persona CR femur size 4 std   size D tibia  Size 29 patella button   Size 11 Vitamin E highly crosslinked medial congruent polyethylene articular surface    PREOPERATIVE ANTIBIOTICS: 2 grams Kefzol    REFERRING PHYSICIAN: Prakash Centeno MD    INDICATIONS: Failure of non operative treatment including injections, bracing, and activity modification.    INTRAOPERATIVE FINDINGS: Complete loss of articular cartilage in the patellofemoral compartment      DESCRIPTION OF PROCEDURE: After informed consent was obtained the patient was taken to the operating room. After the smooth induction of anesthesia, the patient was given a dose of IV antibiotics. The left lower extremity was then prepped and draped in the usual fashion for this type of procedure. We then performed a timeout to verify the site and procedure to be performed.  We then exsanguinated the left lower extremity and inflated the tourniquet to 300 mmHg. We began with a midline incision and medial parapatellar approach. We took 80% of the quad tendon laterally, left a sleeve of tissue around the patella for later repair, and took a sliver of patella tendon.   We were very judicious with our dissection of the medial sided structures to preserve and prevent further stretching of the MCL due to the patient's preoperative genu valgum.  We excised visible fat pad and meniscus.  The ACL and the synovium off the anterior femur were excised.     We then everted the patella. The patella height was  20mm before resurfacing.  We sized the patella to be a 29.  We then prepared the patella and restored patellar height. We then completed preparation of the patella by drilling the lugholes for the component erring on slight medialization of the patella button. We excised all visible synovium on the backside of the quad and patella tendons to minimize the risk of patellar clunk.     We then turned our attention to preparation of the femur. We drilled a  hole into  the distal femur slightly more medial than typical because of the valgus knee. We then placed our intramedullary distal femoral cutting guide set on 5 degrees of valgus. We  took 11mm of distal femur. We took a sliver off the lateral femoral condyle and had a healthy cut on the medial femoral condyle. We marked Mecklenburg's line to codey appropriate external rotation. We then used our anterior referencing sizing guide and sized the femur to be a size 4 and marked our external rotation to line up with Vandana's line. We made sure that our retractors were appropriately  placed and then made our anterior, posterior, and chamfer cuts. Excess bone removed. We then excised the PCL and exposed the proximal tibia.     Turning our attention to the tibia, we used an extramedullary guide with 3' of posterior slope.   We cut to the deepest part of the defect on the lateral tibial plateau. This took about 6 mm off the medial side. We removed this bone. We completed our cut and sized the tibia to be a size D.  We then checked our flexion and extension gaps with a 10-14 mm block. We were balanced in extension and balanced in flexion.   We then externally rotated our tibial baseplate and secured it into place, aiming at the medial third of the tibial tubercle.  We completed preparation of the tibia with our reamer and broach.     We then completed the preparation of our femur by choosing implant location to maximize coverage of the distal femur and minimize overhang. We chose a standard  width implant at this point. We drilled our lug holes.  We trialed our femoral and tibial components for stability and range of motion.    We then injected the posterior capsule with our periarticular mixture.  We then thoroughly irrigated our surfaces and cemented these components into place. Once the cement was allowed to cure, we removed excess cement. We then trialed with various sized articular surfaces and a size 11 spacer seemed to fill the medial and lateral gaps most appropriately.       We then placed our size 11 polyethylene spacer after tourniquet was deflated and hemostasis was obtained. There was no bleeding from the back of the knee. The knee was then thoroughly irrigated with Ortho irrigation and betadine We then placed our spacer without difficulty. We closed our medial parapatellar approach using 0 Ethibond. We then injected our ropivacaine and morphine as indicated earlier. We closed our subcutaneous layer using interrupted Vicryl. We closed our skin using 3-0 running Monocryl. A Prineo dressing system was then applied to the incision. The drapes were taken down and the patient was then transferred back to their stretcher and then to the recovery room in stable condition. All counts were correct postoperatively. I performed the case.      Danielle Mahajan PA-C  was present and necessary for positioning, draping, retraction, instrumentation, and closure.      POSTOPERATIVE PLAN:  1. Weight-bear as tolerated   2. Physical Therapy.    3. They will be on Eliquis for DVT prophylaxis.  4.  24 hours of IV Kefzol.    5. The patient will be under the  medical care of Dr. Tova Gonzalez M.D.*

## 2019-08-21 NOTE — THERAPY EVALUATION
Patient Name: Eli Clayton  : 1940    MRN: [id number]                              Today's Date: 2019       Admit Date: 2019    Visit Dx:     ICD-10-CM ICD-9-CM   1. Primary osteoarthritis of left knee M17.12 715.16     Patient Active Problem List   Diagnosis   • Primary osteoarthritis of left knee     Past Medical History:   Diagnosis Date   • Anesthesia complication     decreased oxygen and heart rate after knee surgery    • Arthritis     osteoarthritis   • Cancer (CMS/HCC)     cervical and skin   • Disease of thyroid gland    • GERD (gastroesophageal reflux disease)    • Hard of hearing     has hearing aid but does not wear it    • History of transfusion    • Interstitial cystitis    • PONV (postoperative nausea and vomiting)    • Wears dentures    • Wears glasses      Past Surgical History:   Procedure Laterality Date   • COLONOSCOPY     • EYE SURGERY      cataracts removed    • HYSTERECTOMY     • KNEE SURGERY Right 2009    replacement    • SKIN BIOPSY       General Information     Row Name 19 1503          PT Evaluation Time/Intention    Document Type  evaluation  -EJ     Mode of Treatment  physical therapy  -     Row Name 19 1503          General Information    Patient Profile Reviewed?  yes  -EJ     Prior Level of Function  min assist:;gait;transfer;using stairs  -EJ     Existing Precautions/Restrictions  fall WBAT L TKA, Adductor canal nerve block.  -EJ     Row Name 19 1503 19 1500       Relationship/Environment    Lives With  alone  -EJ  alone  -LA    Family Caregiver if Needed  --  child(mari), adult  -LA    Family Caregiver Names  --  Felice Serna  -LA    Row Name 19 0627          Relationship/Environment    Primary Source of Support/Comfort  child(mari)  -LB     Name of Support/Comfort Primary Source  daughter- felice  -LB     Lives With  alone  -LB     Family Caregiver if Needed  child(mari), adult  -LB     Row Name 19 1503 19 1500        Resource/Environmental Concerns    Current Living Arrangements  home/apartment/condo  -EJ  home/apartment/condo  -LA    Resource/Environmental Concerns  --  home accessibility 2 steps to enter home  -LA    Home Accessibility Concerns  --  stairs to enter home  -LA    Row Name 08/21/19 0627          Resource/Environmental Concerns    Current Living Arrangements  home/apartment/condo  -LB     Resource/Environmental Concerns  none  -LB     Row Name 08/21/19 1503          Home Main Entrance    Number of Stairs, Main Entrance  -- no steps to dtrs home; 2 steps into her own home.  -EJ     Row Name 08/21/19 1503          Stairs Within Home, Primary    Stairs, Within Home, Primary  4 inch step into shower per dtr.  -EJ     Row Name 08/21/19 1503          Cognitive Assessment/Intervention- PT/OT    Orientation Status (Cognition)  oriented x 4  -EJ     Row Name 08/21/19 1503          Safety Issues, Functional Mobility    Safety Issues Affecting Function (Mobility)  safety precautions follow-through/compliance;safety precaution awareness;awareness of need for assistance  -EJ     Impairments Affecting Function (Mobility)  endurance/activity tolerance;strength;range of motion (ROM);pain  -EJ       User Key  (r) = Recorded By, (t) = Taken By, (c) = Cosigned By    Initials Name Provider Type    EJ Haley Kim PT Physical Therapist    Edel Galeas, RN Case Manager    Savanna Hernandez RN Registered Nurse        Mobility     Row Name 08/21/19 1506          Bed Mobility Assessment/Treatment    Bed Mobility Assessment/Treatment  supine-sit  -EJ     Supine-Sit [place] (Bed Mobility)  minimum assist (75% patient effort)  -EJ     Assistive Device (Bed Mobility)  bed rails;head of bed elevated  -EJ     Comment (Bed Mobility)  assisted with movement of LLE to EOB.  -EJ     Row Name 08/21/19 1506          Transfer Assessment/Treatment    Comment (Transfers)  cuing for approach to chair, advancement of LLE and  hand placement on chair for stand>sit  -EJ     Row Name 08/21/19 1506          Bed-Chair Transfer    Bed-Chair [place] (Transfers)  contact guard  -     Row Name 08/21/19 1506          Sit-Stand Transfer    Sit-Stand [place] (Transfers)  contact guard;2 person assist  -EJ     Assistive Device (Sit-Stand Transfers)  walker, front-wheeled  -EJ     Row Name 08/21/19 1506          Gait/Stairs Assessment/Training    [place] Level (Gait)  contact guard;2 person assist  -EJ     Assistive Device (Gait)  walker, front-wheeled  -EJ     Distance in Feet (Gait)  30  -EJ     Deviations/Abnormal Patterns (Gait)  stride length decreased  -EJ     Bilateral Gait Deviations  heel strike decreased  -EJ     Left Sided Gait Deviations  knee buckling, left side  -EJ     Comment (Gait/Stairs)  Pt took shortened step length BLE with deminished heel strike. With cues to normalize gait pt had slight knee buckling. Pt compensates with decreased WB. RN notified  -[facility] Name 08/21/19 1506          Mobility Assessment/Intervention    Extremity Weight-bearing Status  left lower extremity  -EJ     Left Lower Extremity (Weight-bearing Status)  weight-bearing as tolerated (WBAT)  -       User Key  (r) = Recorded By, (t) = Taken By, (c) = Cosigned By    Initials Name Provider Type    Haley Lane PT Physical Therapist        Obj/Interventions     [facility] Name 08/21/19 1511          General ROM    GENERAL ROM COMMENTS  LLE lacking 25%  -[facility] Name 08/21/19 1511          MMT (Manual Muscle Testing)    General MMT Comments  SLR, LAQ intact, 5/5 BLE DF.  -[facility] Name 08/21/19 1511          Therapeutic Exercise    Lower Extremity (Therapeutic Exercise)  gluteal sets;quad sets, bilateral  -EJ     Lower Extremity Range of Motion (Therapeutic Exercise)  ankle dorsiflexion/plantar flexion, bilateral  -EJ     Exercise Type (Therapeutic Exercise)  AROM (active range of motion);isotonic contraction, concentric;isometric  contraction, static  -[facility] Name 08/21/19 1511          Static Sitting Balance    Level of [place] (Unsupported Sitting, Static Balance)  supervision  -[facility] Name 08/21/19 1511          Dynamic Standing Balance    Level of [place], Reaches Outside Midline (Standing, Dynamic Balance)  contact guard assist  -     Assistive Device Utilized (Supported Standing, Dynamic Balance)  walker, rolling  -     Row Name 08/21/19 1514          Sensory Assessment/Intervention    Sensory General Assessment  no sensation deficits identified  -       User Key  (r) = Recorded By, (t) = Taken By, (c) = Cosigned By    Initials Name Provider Type     Haley Kim, PT Physical Therapist        Goals/Plan     [facility] Name 08/21/19 1519          Bed Mobility Goal 1 (PT)    Activity/Assistive Device (Bed Mobility Goal 1, PT)  sit to supine/supine to sit  -EJ     [place] Level/Cues Needed (Bed Mobility Goal 1, PT)  conditional independence  -EJ     Time Frame (Bed Mobility Goal 1, PT)  3 days  -[facility] Name 08/21/19 1519          Transfer Goal 1 (PT)    Activity/Assistive Device (Transfer Goal 1, PT)  sit-to-stand/stand-to-sit  -EJ     [place] Level/Cues Needed (Transfer Goal 1, PT)  conditional independence  -EJ     Time Frame (Transfer Goal 1, PT)  3 days  -[facility] Name 08/21/19 1519          Gait Training Goal 1 (PT)    Activity/Assistive Device (Gait Training Goal 1, PT)  gait (walking locomotion)  -EJ     [place] Level (Gait Training Goal 1, PT)  conditional independence  -EJ     Time Frame (Gait Training Goal 1, PT)  3 days  -       User Key  (r) = Recorded By, (t) = Taken By, (c) = Cosigned By    Initials Name Provider Type     Haley Kim PT Physical Therapist        Clinical Impression     Row Name 08/21/19 1514          Pain Assessment    Additional Documentation  Pain Scale: Numbers Pre/Post-Treatment (Group)  -[facility] Name 08/21/19 1514          Pain Scale: Numbers  Pre/Post-Treatment    Pain Scale: Numbers, Pretreatment  5/10  -EJ     Pain Scale: Numbers, Post-Treatment  6/10  -EJ     Pain Location - Side  Left  -EJ     Pain Location - Orientation  anterior  -EJ     Pain Location  knee  -EJ     Pain Intervention(s)  Repositioned;Ambulation/increased activity  -EJ     Row Name 08/21/19 1526 08/21/19 1514       Plan of Care Review    Plan of Care Reviewed With  patient  -EJ  patient;daughter  -EJ    Row Name 08/21/19 1400 08/21/19 1200       Plan of Care Review    Plan of Care Reviewed With  patient;family  -SC (r) RG (t) SC (c)  patient  -SC (r) RG (t) SC (c)    Row Name 08/21/19 1128 08/21/19 0609       Plan of Care Review    Plan of Care Reviewed With  patient  -RG  patient  -LB    Row Name 08/21/19 1514          Physical Therapy Clinical Impression    Criteria for Skilled Interventions Met (PT Clinical Impression)  yes;treatment indicated  -EJ     Rehab Potential (PT Clinical Summary)  good, to achieve stated therapy goals  -EJ     Row Name 08/21/19 1514          Positioning and Restraints    Pre-Treatment Position  in bed  -EJ     Post Treatment Position  chair  -EJ     In Chair  reclined;call light within reach;encouraged to call for assist;exit alarm on;with family/caregiver;notified nsg Cold pack donned, SCDs.  -EJ       User Key  (r) = Recorded By, (t) = Taken By, (c) = Cosigned By    Initials Name Provider Type    EJ Haley Kim, PT Physical Therapist    Savanna Hernandez RN Registered Nurse    Piedad Garcia RN Registered Nurse    Rossana Camacho, RN Registered Nurse        Outcome Measures     Row Name 08/21/19 1530          How much help from another person do you currently need...    Turning from your back to your side while in flat bed without using bedrails?  3  -EJ     Moving from lying on back to sitting on the side of a flat bed without bedrails?  3  -EJ     Moving to and from a bed to a chair (including a wheelchair)?  3  -EJ      Standing up from a chair using your arms (e.g., wheelchair, bedside chair)?  3  -EJ     Climbing 3-5 steps with a railing?  2  -EJ     To walk in hospital room?  3  -EJ     AM-PAC 6 Clicks Score (PT)  17  -     Row Name 08/21/19 1530          Functional Assessment    Outcome Measure Options  AM-PAC 6 Clicks Basic Mobility (PT)  -       User Key  (r) = Recorded By, (t) = Taken By, (c) = Cosigned By    Initials Name Provider Type    EJ Haley Kim PT Physical Therapist        Physical Therapy Education     Title: PT OT SLP Therapies (In Progress)     Topic: Physical Therapy (In Progress)     Point: Mobility training (Done)     Learning Progress Summary           Patient Acceptance, E,TB, VU,NR by  at 8/21/2019  3:26 PM                   Point: Body mechanics (Done)     Learning Progress Summary           Patient Acceptance, E,TB, VU,NR by  at 8/21/2019  3:26 PM                   Point: Precautions (Done)     Learning Progress Summary           Patient Acceptance, E,TB, VU,NR by  at 8/21/2019  3:26 PM                               User Key     Initials Effective Dates Name Provider Type [facility] 11/20/18 -  Haley Kim PT Physical Therapist PT              PT Recommendation and Plan     Outcome Summary/Treatment Plan (PT)  Anticipated Discharge Disposition (PT): home with assist, home with home health  Plan of Care Reviewed With: patient  Outcome Summary: Pt ambulates 30 ft in room with CGAx2, RWx. Pt limited by pain, dizziness. BP stable. PADD score of 6. Pt expected to progress and d/c home with HHPT with dtr.      Time Calculation:   PT Charges     Row Name 08/21/19 1531             Time Calculation    Start Time  1520  -EJ      PT Received On  08/21/19  -      PT Goal Re-Cert Due Date  08/31/19  -         Time Calculation- PT    Total Timed Code Minutes- PT  5 minute(s)  -EJ         Timed Charges    42302 - PT Therapeutic Exercise Minutes  5  -EJ        User Key  (r) = Recorded  By, (t) = Taken By, (c) = Cosigned By    Initials Name Provider Type    EJ Haley Kim, PT Physical Therapist        Therapy Charges for Today     Code Description Service Date Service Provider Modifiers Qty    [id number]  PT EVAL MOD COMPLEXITY 4 8/21/2019 Haley Kim, PT  1    [id number]  PT THER SUPP EA 15 MIN 8/21/2019 Haley Kim, PT  2          PT G-Codes  Outcome Measure Options: AM-PAC 6 Clicks Basic Mobility (PT)  AM-PAC 6 Clicks Score (PT): 17    Haley Munoz, PT  8/21/2019

## 2019-08-21 NOTE — ANESTHESIA PROCEDURE NOTES
Peripheral Block    Pre-sedation assessment completed: 8/21/2019 10:50 AM    Patient reassessed immediately prior to procedure    Patient location during procedure: post-op  Start time: 8/21/2019 10:50 AM  Stop time: 8/21/2019 11:10 AM  Reason for block: at surgeon's request and post-op pain management  Performed by  Anesthesiologist: Beka Olivares MD  Assisted by: Darius Perry CRNA  Preanesthetic Checklist  Completed: patient identified, site marked, surgical consent, pre-op evaluation, timeout performed, IV checked, risks and benefits discussed and monitors and equipment checked  Prep:  Pt Position: supine  Sterile barriers:cap, gloves, mask and sterile barriers  Prep: ChloraPrep  Patient monitoring: blood pressure monitoring, continuous pulse oximetry and EKG  Procedure  Performed under: spinal  Guidance:ultrasound guided  Images:still images obtained, printed/placed on chart    Block Type:adductor canal block  Injection Technique:catheter  Needle Type:Tuohy and echogenic  Needle Gauge:18 G  Resistance on Injection: none  Catheter Size:20 G (20g)    Medications Used: bupivacaine PF (MARCAINE) 0.25 % injection, 20 mL  Med admintered at 8/21/2019 11:10 AM      Medications  Preservative Free Saline:10ml    Post Assessment  Injection Assessment: negative aspiration for heme, incremental injection and no paresthesia on injection  Patient Tolerance:comfortable throughout block  Complications:no  Additional Notes  Procedure:             The pt was placed in the Supine position.  The Insertion site was  prepped and Draped in sterile fashion.  The pt was anesthetized with  IV Sedation( see meds).  Skin and cutaneous tissue was infiltrated and anesthetized with 1% Lidocaine 3 mls via a 25g needle.  A BBraun 4 inch 18g echogenic needle was then  inserted approximately midline, mid-thigh and advanced In-plane with Ultrasound guidance.  Normal Saline PSF was utilized for hydrodissection of tissue.  The Vastus medialis  and Sartorius muscle where visualized and the needle tip was placed in the adductor canal,  lateral to the femoral artery.  LA injection spread was visualized, injection was incremental 1-5ml, injection pressure was normal or little, no intraneural injection, no vascular injection.  LA dose was injected thru the needle(see dose above).  A BBraun 20g wire stylet catheter was placed via the needle with ultrasound visualization and confirmation with NS fluid bolus. The labeled Catheter was then secured to skin at insertion site with skin afix and steristrips to curled catheter and CHG transparent dressing.  Thank you.

## 2019-08-21 NOTE — PLAN OF CARE
Problem: Patient Care Overview  Goal: Plan of Care Review  Outcome: Ongoing (interventions implemented as appropriate)   08/21/19 0328   Coping/Psychosocial   Plan of Care Reviewed With patient   OTHER   Outcome Summary Pt ambulates 30 ft in room with CGAx2, RWx. Pt limited by pain, dizziness. BP stable. PADD score of 6. Pt expected to progress and d/c home with HHPT with dtr. ROM to be measured tomorrow.

## 2019-08-21 NOTE — PROGRESS NOTES
Discharge Planning Assessment  [facility]     Patient Name: Eli Clayton  MRN: [id number]  Today's Date: 8/21/2019    Admit Date: 8/21/2019    Discharge Needs Assessment     Row Name 08/21/19 1500       Living Environment    Lives With  alone    Unique Family Situation  Patient [place] stay with her daughterCelina, at [place]    Current Living Arrangements  home/apartment/condo    Primary Care Provided by  self    Provides Primary Care For  no one    Family Caregiver if Needed  child(mari), adult    Family Caregiver Names  DaughterCelina    Quality of Family Relationships  helpful;involved;supportive    Able to Return to Prior Arrangements  no    Living Arrangement Comments  Patient will be staying with daughter at [place]       Resource/Environmental Concerns    Resource/Environmental Concerns  home accessibility 2 steps to enter home    Home Accessibility Concerns  stairs to enter home       Transition Planning    Patient/Family Anticipates Transition to  home with family;home with help/services    Patient/Family Anticipated Services at Transition  home health care;rehabilitation services    Transportation Anticipated  family or friend will provide       Discharge Needs Assessment    Readmission Within the Last 30 Days  no previous admission in last 30 days    Concerns to be Addressed  discharge planning    Equipment Currently Used at Home  other (see comments);walker, rolling;shower chair;commode Elevated toilet seat and OT adaptive devices    Anticipated Changes Related to Illness  inability to care for self    Equipment Needed After Discharge  none    Outpatient/Agency/Support Group Needs  homecare agency    Discharge Facility/Level of Care Needs  home with home health    Offered/Gave Vendor List  yes    Patient's Choice of Community Agency(s)  Patient selected . [facility]        Discharge Plan     Row Name 08/21/19 5992       Plan    Plan  Home with [facility] and daughter    Patient/Family in  Agreement with Plan  yes    Plan Comments  Spoke with Ms. Clayton and her daughter. Patient lives alone in [place] but will be staying with her daughter in [place] at [place]. Patient has a rolling walker, OT adaptive device kit, shower chair, bedside commode and elevated toilet seat. She has 2 small steps to enter her home. List provided of home health agencies and she selected [facility]. Called and faxed referral. Advised [facility]  that Ms. Clayton will be going to her daughter's home at [address] [place] [address]. No other needs at this time.     Final Discharge Disposition Code  06 - home with home health care              Home Medical Care      Service Provider Request Status Selected Services Address Phone Number Fax Number    [facility] Selected Home Health Services [address] [place] [address] [phone]             Demographic Summary     Row Name 08/21/19 1459       General Information    Admission Type  observation    Arrived From  home    Referral Source  admission list    Reason for Consult  discharge planning    General Information Comments  PCP is Heri Centeno        Functional Status     Row Name 08/21/19 1459       Functional Status    Usual Activity Tolerance  good    Current Activity Tolerance  moderate       Functional Status, IADL    Medications  independent    Meal Preparation  independent    Housekeeping  independent    Laundry  independent    Shopping  independent       Mental Status    General Appearance WDL  WDL       Mental Status Summary    Recent Changes in Mental Status/Cognitive Functioning  no changes       Employment/    Employment/ Comments  No complaints regarding medication affordability        Edel Squires, VIOLETA

## 2019-08-21 NOTE — DISCHARGE PLACEMENT REQUEST
"Eli Clayton (79 y.o. Female)   Patient will likely discharge on 8/22. Will need to be seen on 8/23. Patient will be staying at her daughter's house at:  [address]  [place], [place] [address]    Thank you,  Edel Squires, RN  [phone]    Date of Birth Social Security Number Address Home Phone MRN    1940  [address] [facility]   [place] [facility] [address] [phone] [id number]    Hoahaoism Marital Status          [facility]        Admission Date Admission Type Admitting Provider Attending Provider Department, Room/Bed    8/21/19 Elective Tristan Gonzalez MD Talwalkar, Janak Ramesh, MD [facility] 3H, S377/1    Discharge Date Discharge Disposition Discharge Destination                       Attending Provider:  Tristan Gonzalez MD    Allergies:  Codeine, Latex    Isolation:  None   Infection:  None   Code Status:  CPR    Ht:  166.4 cm (65.5\")   Wt:  77 kg (169 lb 12.8 oz)    Admission Cmt:  None   Principal Problem:  Primary osteoarthritis of left knee [M17.12] More...                 Active Insurance as of 8/21/2019     Primary Coverage     Payor Plan Insurance Group Employer/Plan Group    MEDICARE MEDICARE A & B      Payor Plan Address Payor Plan Phone Number Payor Plan Fax Number Effective Dates    [address] [phone]  5/1/2007 - None Entered    [facility] [id number]       Subscriber Name Subscriber Birth Date Member ID       ELI CLAYTON 1940 [id number]           Secondary Coverage     Payor Plan Insurance Group Employer/Plan Group    BANKERS FIDELITY BANKERS FIDELITY      Payor Plan Address Payor Plan Phone Number Payor Plan Fax Number Effective Dates    [address] [phone]  5/1/2018 - None Entered    [facility] [address]       Subscriber Name Subscriber Birth Date Member ID       ELI CLAYTON 1940 [id number]                 Emergency Contacts      (Rel.) Home Phone Work Phone Mobile Phone    Marion,Celina " (Daughter) [phone] -- --        [address]  [address] [facility] [address]  Phone:  [phone]  Fax:   Date: Aug 21, 2019      Ambulatory Referral to Home Health     Patient:  Eli Clayton MRN:  [id number]   [address] [place] [address] :  1940  SSN:    Phone: [phone] Sex:  F      INSURANCE PAYOR PLAN GROUP # SUBSCRIBER ID   Primary:  Secondary:    MEDICARE  [employer]ITY [id number]  [id number]      [id number]  [id number]      Referring Provider Information:  ARNAUD GONZALEZ Phone: [phone] Fax:       Referral Information:   # Visits:  1 Referral Type: Home Health [42]   Urgency:  Routine Referral Reason: Specialty Services Required   Start Date: Aug 21, 2019 End Date:  To be determined by Insurer   Diagnosis: Primary osteoarthritis of left knee (M17.12 [ICD-10-CM] 715.16 [ICD-9-CM])      Refer to Dept:   Refer to Provider:   Refer to Facility:       Face to Face Visit Date: 2019  Follow-up Provider for Plan of Care? I will be treating the patient on an ongoing basis.  Please send me the Plan of Care for signature.  Follow-up Provider: ARNAUD GONZALEZ [[id number]]  Reason/Clinical Findings: Left total knee arthroplasty  Describe mobility limitations that make leaving home difficult: Left total knee arthroplasty  Nursing/Therapeutic Services Requested: Physical Therapy     This document serves as a request of services and does not constitute Insurance authorization or approval of services.  To determine eligibility, please contact the members Insurance carrier to verify and review coverage.     If you have medical questions regarding this request for services. Please contact [address] at [phone] between the hours of 8:00am - 5:00pm (Mon-Fri).       Verbal Order Mode: Verbal with readback   Authorizing Provider: Arnaud Gonzalez MD  Authorizing Provider's NPI: [id number]     Order  Entered By: Edel Cornejo RN 8/21/2019  2:53 PM                     History & Physical      Savanna Coelho APRN at 8/21/2019  6:43 AM     Attestation signed by Tristan Gonzalez MD at 8/21/2019  7:13 AM    I have examined the patient and reviewed the above information.   I agree with the treatment plan and have discussed the plan with the patient who agrees to proceed.                     Pre-Op H&P (See Recent Office Note Attached for Full H&P)    Chief complaint: Left knee pain    HPI:      Patient is a 79 y.o. female who presents with a history of left knee pain that she describes as moderate to severe.  X-rays of the patient's left knee show end-stage patellofemoral arthritis with early moderate medial lateral compartment degenerative changes. Conservative treatment has failed to provide significant relief. Surgical intervention is recommended and she is agreeable. She is here today for a left total knee arthroplasty.    Review of Systems:  General ROS:  no fever, chills, rashes, No change since last office visit  Cardiovascular ROS: no chest pain or dyspnea on exertion  Respiratory ROS: no cough, shortness of breath, or wheezing    Meds:    No current facility-administered medications on file prior to encounter.      Current Outpatient Medications on File Prior to Encounter   Medication Sig Dispense Refill   • aspirin 81 MG chewable tablet Chew 81 mg Daily.     • PARoxetine (PAXIL) 20 MG tablet Take 20 mg by mouth Every Morning.     • influenza vac split high-dose (FLUZONE HIGH DOSE) 0.5 ML suspension prefilled syringe injection Fluzone High-Dose 2011-12 (PF) 180 mcg/0.5 mL intramuscular syringe   1 (one) suspension intramuscular once a day     • levothyroxine (SYNTHROID, LEVOTHROID) 75 MCG tablet Take 75 mcg by mouth Daily.         Vital Signs:  /71 (BP Location: Right arm, Patient Position: Lying)   Pulse 65   Temp 97.9 °F (36.6 °C) (Temporal)   Resp 18   SpO2 98%    "Breastfeeding? No     Physical Exam:    CV:  S1S2 regular rate and rhythm, no murmur               Resp:  Clear to auscultation; respirations regular, even and unlabored    Results Review:     Lab Results   Component Value Date    WBC 7.82 08/08/2019    HGB 13.2 08/08/2019    HCT 41.4 08/08/2019    MCV 95.0 08/08/2019     08/08/2019    NEUTROABS 4.99 08/08/2019    GLUCOSE 91 08/08/2019    BUN 11 08/08/2019    CREATININE 0.87 08/08/2019    EGFRIFNONA 63 08/08/2019     08/08/2019    K 4.8 08/08/2019     08/08/2019    CO2 24.0 08/08/2019    CALCIUM 9.6 08/08/2019       *I have reviewed the patient's recent clinical results.    Cancer Staging (if applicable)  Cancer Patient: __ yes _X_no __unknown; If yes, clinical stage T:__ N:__M:__, stage group or __N/A    Assessment: Left knee osteoarthritis    Plan: Left total knee arthroplasty      Savanna Coelho, APRN  8/21/2019   6:43 AM      Electronically signed by Tristan Gonzalez MD at 8/21/2019  7:13 AM   Source Note                 [facility]    Subjective     CC: Follow-up (3 months - Primary osteoarthritis of left knee )      HPI    Eli Clayton is a 79 y.o. female.  Patient returns the office today for follow-up of her left knee.  She has brought her daughter with her today.  She tells me the cortical steroid injection given in April did not help her as much as she would have liked.  She has failed Visco supplementation as well.  She has difficulty getting up and down from a seated position and walking without significant pain.  She rates her pain is moderate to severe.  She is failed anti-inflammatories as well.  She did well from a right total knee with Dr. Valentin in 2009.      ROS:    Constiutional:Pt denies fever, chills, nausea, or vomiting.  MSK:as above    Objective      Past Medical History  History reviewed. No pertinent past medical history.      Physical Exam  Pulse 83   Ht 160 cm (62.99\")   Wt " 77.2 kg (170 lb 3.1 oz)   SpO2 98%   BMI 30.16 kg/m²      Body mass index is 30.16 kg/m².    Patient is well nourished and well developed.        Ortho Exam  Peripheral Vascular:    Upper Extremity:   Inspection:  Left--no cyanotic nail beds Right--no cyanotic nail beds   Bilateral:  Pink nail beds with brisk capillary refill   Palpation:  Bilateral radial pulse normal    Musculoskeletal:  Global Assessment:  Overall assessment of Lower Extremity Muscle Strength and Tone:  Left quadriceps--5/5   Left hamstrings--5/5       Left tibialis anterior--5/5  Left gastroc-soleus--5/5  Left EHL --5/5    Lower Extremity:  Knee/Patella:  No digital clubbing or cyanosis.    Examination of left knee reveals:  Normal deep tendon reflexes, coordination, strength, tone, sensation.  No known fractures or deformities.    Inspection and Palpation:  Left knee:  Tenderness:  Over the medial joint line and moderate severity  Effusion:  1+  Crepitus:  Positive  Pulses:  2+  Ecchymosis:  None  Warmth:  None     ROM:  Right:  Extension: 5    Flexion: 120   Left:  Extension: 5     Flexion:120    Instability:    Left:  Lachman Test:  Negative, Varus stress test negative, Valgus stress test negative    Deformities/Malalignments/Discrepancies:    Left:  Genu Varum   Right:  No deformity    Functional Testing:  Vineet's test:  Negative  Patella grind test:  Positive  Q-angle:  normal      Imaging/Labs/EMG Reviewed:  X-rays of the patient's left knee show end-stage patellofemoral arthritis with early moderate medial lateral compartment degenerative changes.    Assessment:  1. Primary osteoarthritis of left knee        Plan:  1. Recommend over the counter anti-inflammatories for pain and/or swelling  2. Osteoarthritis left knee--we have had a lengthy discussion with the patient today regarding treatment options and alternatives.  At this point, the patient has exhausted nonoperative treatment modalities and I believe would do well with left  "total knee arthroplasty surgery.  The risk, benefits, potential hazards, typical recovery and rehab as well as reasonable alternatives were discussed with her this afternoon.  She had the opportunity to ask questions and wishes to proceed with scheduling.  We will get her to Claude for scheduling.      Tristan Gonzalez MD  07/29/19  1:29 PM     Electronically signed by Tristan Gonzalez MD at 7/29/2019  1:30 PM             Tristan Gonzalez MD at 7/29/2019  1:00 PM              [facility] Note    Subjective     CC: Follow-up (3 months - Primary osteoarthritis of left knee )      HPI    Eli Clayton is a 79 y.o. female.  Patient returns the office today for follow-up of her left knee.  She has brought her daughter with her today.  She tells me the cortical steroid injection given in April did not help her as much as she would have liked.  She has failed Visco supplementation as well.  She has difficulty getting up and down from a seated position and walking without significant pain.  She rates her pain is moderate to severe.  She is failed anti-inflammatories as well.  She did well from a right total knee with Dr. Valentin in 2009.      ROS:    Constiutional:Pt denies fever, chills, nausea, or vomiting.  MSK:as above    Objective      Past Medical History  History reviewed. No pertinent past medical history.      Physical Exam  Pulse 83   Ht 160 cm (62.99\")   Wt 77.2 kg (170 lb 3.1 oz)   SpO2 98%   BMI 30.16 kg/m²      Body mass index is 30.16 kg/m².    Patient is well nourished and well developed.        Ortho Exam  Peripheral Vascular:    Upper Extremity:   Inspection:  Left--no cyanotic nail beds Right--no cyanotic nail beds   Bilateral:  Pink nail beds with brisk capillary refill   Palpation:  Bilateral radial pulse normal    Musculoskeletal:  Global Assessment:  Overall assessment of Lower Extremity Muscle Strength and Tone:  Left quadriceps--5/5   Left " hamstrings--5/5       Left tibialis anterior--5/5  Left gastroc-soleus--5/5  Left EHL --5/5    Lower Extremity:  Knee/Patella:  No digital clubbing or cyanosis.    Examination of left knee reveals:  Normal deep tendon reflexes, coordination, strength, tone, sensation.  No known fractures or deformities.    Inspection and Palpation:  Left knee:  Tenderness:  Over the medial joint line and moderate severity  Effusion:  1+  Crepitus:  Positive  Pulses:  2+  Ecchymosis:  None  Warmth:  None     ROM:  Right:  Extension: 5    Flexion: 120   Left:  Extension: 5     Flexion:120    Instability:    Left:  Lachman Test:  Negative, Varus stress test negative, Valgus stress test negative    Deformities/Malalignments/Discrepancies:    Left:  Genu Varum   Right:  No deformity    Functional Testing:  Vineet's test:  Negative  Patella grind test:  Positive  Q-angle:  normal      Imaging/Labs/EMG Reviewed:  X-rays of the patient's left knee show end-stage patellofemoral arthritis with early moderate medial lateral compartment degenerative changes.    Assessment:  1. Primary osteoarthritis of left knee        Plan:  3. Recommend over the counter anti-inflammatories for pain and/or swelling  4. Osteoarthritis left knee--we have had a lengthy discussion with the patient today regarding treatment options and alternatives.  At this point, the patient has exhausted nonoperative treatment modalities and I believe would do well with left total knee arthroplasty surgery.  The risk, benefits, potential hazards, typical recovery and rehab as well as reasonable alternatives were discussed with her this afternoon.  She had the opportunity to ask questions and wishes to proceed with scheduling.  We will get her to Claude for scheduling.      Tristan Gonzalez MD  07/29/19  1:29 PM    Electronically signed by Tristan Gonzalez MD at 7/29/2019  1:30 PM          Operative/Procedure Notes (last 24 hours) (Notes from 8/20/2019  2:57 PM  through 8/21/2019  2:57 PM)      Tristan Gonzalez MD at 8/21/2019  8:15 AM        Orthopaedics Operative Report    PREOPERATIVE DIAGNOSIS: Left knee primary osteoarthritis     POSTOPERATIVE DIAGNOSIS: Same    PROCEDURE PERFORMED: Left total knee arthroplasty, CPT 81902    SURGEON: Tristan Gonzalez MD    ANESTHESIA:  Spinal with post-operative indwelling low femoral nerve block.  Intraoperative injection of 5ml of Morphine with 20ml of 0.5% ropivicaine given before closure.    ANESTHESIA STAFF:  [facility]    STAFF:  Circulator: Anna Hill RN  Scrub Person: Roe Biswas; Yassine Moore  Vendor Representative: Alex Barkley  Nursing Assistant: Kesha Medina  Assistant: Danielle Mahajan PA-C  Other: Nieves Montes RN    TOURNIQUET TIME: 92 min    ESTIMATED BLOOD LOSS: 50 mL    COMPLICATIONS: None apparent.    RELEASES:None required after bone cuts made    IMPLANTS:   Sienna Persona CR femur size 4 std   size D tibia  Size 29 patella button   Size 11 Vitamin E highly crosslinked medial congruent polyethylene articular surface    PREOPERATIVE ANTIBIOTICS: 2 grams Kefzol    REFERRING PHYSICIAN: Prakash Centeno MD    INDICATIONS: Failure of non operative treatment including injections, bracing, and activity modification.    INTRAOPERATIVE FINDINGS: Complete loss of articular cartilage in the patellofemoral compartment      DESCRIPTION OF PROCEDURE: After informed consent was obtained the patient was taken to the operating room. After the smooth induction of anesthesia, the patient was given a dose of IV antibiotics. The left lower extremity was then prepped and draped in the usual fashion for this type of procedure. We then performed a timeout to verify the site and procedure to be performed.  We then exsanguinated the left lower extremity and inflated the tourniquet to 300 mmHg. We began with a midline incision and medial parapatellar approach. We took 80% of the quad tendon laterally, left a  sleeve of tissue around the patella for later repair, and took a sliver of patella tendon.  We were very judicious with our dissection of the medial sided structures to preserve and prevent further stretching of the MCL due to the patient's preoperative genu valgum.  We excised visible fat pad and meniscus.  The ACL and the synovium off the anterior femur were excised.     We then everted the patella. The patella height was  20mm before resurfacing.  We sized the patella to be a 29.  We then prepared the patella and restored patellar height. We then completed preparation of the patella by drilling the lugholes for the component erring on slight medialization of the patella button. We excised all visible synovium on the backside of the quad and patella tendons to minimize the risk of patellar clunk.     We then turned our attention to preparation of the femur. We drilled a  hole into  the distal femur slightly more medial than typical because of the valgus knee. We then placed our intramedullary distal femoral cutting guide set on 5 degrees of valgus. We  took 11mm of distal femur. We took a sliver off the lateral femoral condyle and had a healthy cut on the medial femoral condyle. We marked [place]'s line to codey appropriate external rotation. We then used our anterior referencing sizing guide and sized the femur to be a size 4 and marked our external rotation to line up with [place]'s line. We made sure that our retractors were appropriately  placed and then made our anterior, posterior, and chamfer cuts. Excess bone removed. We then excised the PCL and exposed the proximal tibia.     Turning our attention to the tibia, we used an extramedullary guide with 3' of posterior slope.   We cut to the deepest part of the defect on the lateral tibial plateau. This took about 6 mm off the medial side. We removed this bone. We completed our cut and sized the tibia to be a size D.  We then checked our flexion and  extension gaps with a 10-14 mm block. We were balanced in extension and balanced in flexion.  We then externally rotated our tibial baseplate and secured it into place, aiming at the medial third of the tibial tubercle.  We completed preparation of the tibia with our reamer and broach.     We then completed the preparation of our femur by choosing implant location to maximize coverage of the distal femur and minimize overhang. We chose a standard  width implant at this point. We drilled our lug holes.  We trialed our femoral and tibial components for stability and range of motion.    We then injected the posterior capsule with our periarticular mixture.  We then thoroughly irrigated our surfaces and cemented these components into place. Once the cement was allowed to cure, we removed excess cement. We then trialed with various sized articular surfaces and a size 11 spacer seemed to fill the medial and lateral gaps most appropriately.       We then placed our size 11 polyethylene spacer after tourniquet was deflated and hemostasis was obtained. There was no bleeding from the back of the knee. The knee was then thoroughly irrigated with Ortho irrigation and betadine We then placed our spacer without difficulty. We closed our medial parapatellar approach using 0 Ethibond. We then injected our ropivacaine and morphine as indicated earlier. We closed our subcutaneous layer using interrupted Vicryl. We closed our skin using 3-0 running Monocryl. A Prineo dressing system was then applied to the incision. The drapes were taken down and the patient was then transferred back to their stretcher and then to the recovery room in stable condition. All counts were correct postoperatively. I performed the case.      Danielle Mahajan PA-C  was present and necessary for positioning, draping, retraction, instrumentation, and closure.      POSTOPERATIVE PLAN:  1. Weight-bear as tolerated   2. Physical Therapy.    3. They will be on  Eliquis for DVT prophylaxis.  4.  24 hours of IV Kefzol.    5. The patient will be under the medical care of Dr. Tova Gonzalez M.D.*                Electronically signed by Tristan Gonzalez MD at 8/21/2019 10:33 AM

## 2019-08-21 NOTE — ANESTHESIA PROCEDURE NOTES
Spinal Block    Pre-sedation assessment completed: 8/21/2019 7:43 AM    Patient reassessed immediately prior to procedure    Patient location during procedure: OR  Start Time: 8/21/2019 7:43 AM  Stop Time: 8/21/2019 7:50 AM  Indication:at surgeon's request  Performed By  CRNA: Darius Perry CRNA  Preanesthetic Checklist  Completed: patient identified, site marked, surgical consent, pre-op evaluation, timeout performed, IV checked, risks and benefits discussed and monitors and equipment checked  Spinal Block Prep:  Patient Position:sitting  Sterile Tech:cap, gloves, sterile barriers and mask  Prep:Chloraprep  Patient Monitoring:blood pressure monitoring, continuous pulse oximetry and EKG  Spinal Block Procedure  Approach:midline  Guidance:landmark technique and palpation technique  Location:L2-L3  Needle Type:Quincke  Needle Gauge:22 G  Placement of Spinal needle event:cerebrospinal fluid aspirated  Paresthesia: no  Fluid Appearance:clear  Medications: bupivacaine (MARCAINE) 0.5 % injection, 2 mL  Med Administered at 8/21/2019 7:50 AM   Post Assessment  Patient Tolerance:patient tolerated the procedure well with no apparent complications  Complications no  Additional Notes  Procedure:  Pt assisted to sitting position, with legs in position of comfort over side of bed.  Pt. instructed in optimal spine presentation, the spine was prepped/ Draped and the skin at insertion site was anesthetized with 1% Lidocaine 2 ml.  The spinal needle was then advanced until CSF flow was obtained and LA was injected:

## 2019-08-21 NOTE — PLAN OF CARE
Problem: Patient Care Overview  Goal: Plan of Care Review  Outcome: Ongoing (interventions implemented as appropriate)   08/21/19 9019   Coping/Psychosocial   Plan of Care Reviewed With patient;family   OTHER   Outcome Summary VSS. Patient able to void. Pain well controlled with PRN pain meds and ARROW pump. Will continue to monitor.    Plan of Care Review   Progress improving

## 2019-08-21 NOTE — ANESTHESIA PREPROCEDURE EVALUATION
Anesthesia Evaluation     Patient summary reviewed and Nursing notes reviewed   history of anesthetic complications (decreased HR and Oxygen after knee surgery): PONV  NPO Solid Status: > 8 hours  NPO Liquid Status: > 2 hours           Airway   Mallampati: II  TM distance: >3 FB  Neck ROM: full  No difficulty expected  Dental    (+) upper dentures    Pulmonary - negative pulmonary ROS and normal exam   Cardiovascular - normal exam  Exercise tolerance: good (4-7 METS)    ECG reviewed    (-) hypertension, valvular problems/murmurs, dysrhythmias, angina, EDOUARD      Neuro/Psych- negative ROS  GI/Hepatic/Renal/Endo    (+)  GERD,  hypothyroidism,     Musculoskeletal     Abdominal    Substance History      OB/GYN          Other   (+) arthritis   history of cancer                  Anesthesia Plan    ASA 3     spinal and regional   (Spinal and block post op)  intravenous induction   Anesthetic plan, all risks, benefits, and alternatives have been provided, discussed and informed consent has been obtained with: patient.    Plan discussed with CRNA.

## 2019-08-21 NOTE — INTERVAL H&P NOTE
Pre-Op H&P (See Recent Office Note Attached for Full H&P)    Chief complaint: Left knee pain    HPI:      Patient is a 79 y.o. female who presents with a history of left knee pain that she describes as moderate to severe.  X-rays of the patient's left knee show end-stage patellofemoral arthritis with early moderate medial lateral compartment degenerative changes. Conservative treatment has failed to provide significant relief. Surgical intervention is recommended and she is agreeable. She is here today for a left total knee arthroplasty.    Review of Systems:  General ROS:  no fever, chills, rashes, No change since last office visit  Cardiovascular ROS: no chest pain or dyspnea on exertion  Respiratory ROS: no cough, shortness of breath, or wheezing    Meds:    No current facility-administered medications on file prior to encounter.      Current Outpatient Medications on File Prior to Encounter   Medication Sig Dispense Refill   • aspirin 81 MG chewable tablet Chew 81 mg Daily.     • PARoxetine (PAXIL) 20 MG tablet Take 20 mg by mouth Every Morning.     • influenza vac split high-dose (FLUZONE HIGH DOSE) 0.5 ML suspension prefilled syringe injection Fluzone High-Dose 2011-12 (PF) 180 mcg/0.5 mL intramuscular syringe   1 (one) suspension intramuscular once a day     • levothyroxine (SYNTHROID, LEVOTHROID) 75 MCG tablet Take 75 mcg by mouth Daily.         Vital Signs:  /71 (BP Location: Right arm, Patient Position: Lying)   Pulse 65   Temp 97.9 °F (36.6 °C) (Temporal)   Resp 18   SpO2 98%   Breastfeeding? No     Physical Exam:    CV:  S1S2 regular rate and rhythm, no murmur               Resp:  Clear to auscultation; respirations regular, even and unlabored    Results Review:     Lab Results   Component Value Date    WBC 7.82 08/08/2019    HGB 13.2 08/08/2019    HCT 41.4 08/08/2019    MCV 95.0 08/08/2019     08/08/2019    NEUTROABS 4.99 08/08/2019    GLUCOSE 91 08/08/2019    BUN 11 08/08/2019     CREATININE 0.87 08/08/2019    EGFRIFNONA 63 08/08/2019     08/08/2019    K 4.8 08/08/2019     08/08/2019    CO2 24.0 08/08/2019    CALCIUM 9.6 08/08/2019       *I have reviewed the patient's recent clinical results.    Cancer Staging (if applicable)  Cancer Patient: __ yes _X_no __unknown; If yes, clinical stage T:__ N:__M:__, stage group or __N/A    Assessment: Left knee osteoarthritis    Plan: Left total knee arthroplasty      Savanna Coelho, APRN  8/21/2019   6:43 AM

## 2019-08-22 PROBLEM — G89.18 ACUTE POSTOPERATIVE PAIN: Status: ACTIVE | Noted: 2019-08-22

## 2019-08-22 PROBLEM — D62 ACUTE BLOOD LOSS ANEMIA: Status: ACTIVE | Noted: 2019-08-22

## 2019-08-22 PROBLEM — D72.829 LEUKOCYTOSIS: Status: ACTIVE | Noted: 2019-08-22

## 2019-08-22 LAB
ANION GAP SERPL CALCULATED.3IONS-SCNC: 7 MMOL/L (ref 5–15)
BASOPHILS # BLD AUTO: 0.02 10*3/MM3 (ref 0–0.2)
BASOPHILS NFR BLD AUTO: 0.2 % (ref 0–1.5)
BUN BLD-MCNC: 9 MG/DL (ref 8–23)
BUN/CREAT SERPL: 11.4 (ref 7–25)
CALCIUM SPEC-SCNC: 8.3 MG/DL (ref 8.6–10.5)
CHLORIDE SERPL-SCNC: 108 MMOL/L (ref 98–107)
CO2 SERPL-SCNC: 23 MMOL/L (ref 22–29)
CREAT BLD-MCNC: 0.79 MG/DL (ref 0.57–1)
DEPRECATED RDW RBC AUTO: 45.8 FL (ref 37–54)
EOSINOPHIL # BLD AUTO: 0 10*3/MM3 (ref 0–0.4)
EOSINOPHIL NFR BLD AUTO: 0 % (ref 0.3–6.2)
ERYTHROCYTE [DISTWIDTH] IN BLOOD BY AUTOMATED COUNT: 12.8 % (ref 12.3–15.4)
GFR SERPL CREATININE-BSD FRML MDRD: 71 ML/MIN/1.73
GLUCOSE BLD-MCNC: 134 MG/DL (ref 65–99)
HCT VFR BLD AUTO: 34.5 % (ref 34–46.6)
HGB BLD-MCNC: 10.8 G/DL (ref 12–15.9)
IMM GRANULOCYTES # BLD AUTO: 0.07 10*3/MM3 (ref 0–0.05)
IMM GRANULOCYTES NFR BLD AUTO: 0.5 % (ref 0–0.5)
LYMPHOCYTES # BLD AUTO: 1.41 10*3/MM3 (ref 0.7–3.1)
LYMPHOCYTES NFR BLD AUTO: 10.6 % (ref 19.6–45.3)
MCH RBC QN AUTO: 30.2 PG (ref 26.6–33)
MCHC RBC AUTO-ENTMCNC: 31.3 G/DL (ref 31.5–35.7)
MCV RBC AUTO: 96.4 FL (ref 79–97)
MONOCYTES # BLD AUTO: 1.52 10*3/MM3 (ref 0.1–0.9)
MONOCYTES NFR BLD AUTO: 11.4 % (ref 5–12)
NEUTROPHILS # BLD AUTO: 10.28 10*3/MM3 (ref 1.7–7)
NEUTROPHILS NFR BLD AUTO: 77.3 % (ref 42.7–76)
NRBC BLD AUTO-RTO: 0 /100 WBC (ref 0–0.2)
PLATELET # BLD AUTO: 292 10*3/MM3 (ref 140–450)
PMV BLD AUTO: 9.9 FL (ref 6–12)
POTASSIUM BLD-SCNC: 4.3 MMOL/L (ref 3.5–5.2)
RBC # BLD AUTO: 3.58 10*6/MM3 (ref 3.77–5.28)
SODIUM BLD-SCNC: 138 MMOL/L (ref 136–145)
WBC NRBC COR # BLD: 13.3 10*3/MM3 (ref 3.4–10.8)

## 2019-08-22 PROCEDURE — 63710000001 PANTOPRAZOLE 40 MG TABLET DELAYED-RELEASE: Performed by: NURSE PRACTITIONER

## 2019-08-22 PROCEDURE — 63710000001 OXYCODONE-ACETAMINOPHEN 5-325 MG TABLET: Performed by: ORTHOPAEDIC SURGERY

## 2019-08-22 PROCEDURE — 85025 COMPLETE CBC W/AUTO DIFF WBC: CPT | Performed by: ORTHOPAEDIC SURGERY

## 2019-08-22 PROCEDURE — A9270 NON-COVERED ITEM OR SERVICE: HCPCS | Performed by: ORTHOPAEDIC SURGERY

## 2019-08-22 PROCEDURE — 63710000001 DOCUSATE SODIUM 100 MG CAPSULE: Performed by: ORTHOPAEDIC SURGERY

## 2019-08-22 PROCEDURE — 80048 BASIC METABOLIC PNL TOTAL CA: CPT | Performed by: ORTHOPAEDIC SURGERY

## 2019-08-22 PROCEDURE — 25810000003 SODIUM CHLORIDE 0.9 % WITH KCL 20 MEQ 20-0.9 MEQ/L-% SOLUTION: Performed by: ORTHOPAEDIC SURGERY

## 2019-08-22 PROCEDURE — A9270 NON-COVERED ITEM OR SERVICE: HCPCS | Performed by: NURSE PRACTITIONER

## 2019-08-22 PROCEDURE — 63710000001 APIXABAN 2.5 MG TABLET: Performed by: ORTHOPAEDIC SURGERY

## 2019-08-22 PROCEDURE — 63710000001 PAROXETINE 20 MG TABLET: Performed by: NURSE PRACTITIONER

## 2019-08-22 PROCEDURE — 97535 SELF CARE MNGMENT TRAINING: CPT

## 2019-08-22 PROCEDURE — 97110 THERAPEUTIC EXERCISES: CPT

## 2019-08-22 PROCEDURE — 97166 OT EVAL MOD COMPLEX 45 MIN: CPT

## 2019-08-22 PROCEDURE — 25010000002 MORPHINE PER 10 MG: Performed by: ORTHOPAEDIC SURGERY

## 2019-08-22 PROCEDURE — 99024 POSTOP FOLLOW-UP VISIT: CPT | Performed by: ORTHOPAEDIC SURGERY

## 2019-08-22 PROCEDURE — 63710000001 LEVOTHYROXINE 75 MCG TABLET: Performed by: NURSE PRACTITIONER

## 2019-08-22 PROCEDURE — 97116 GAIT TRAINING THERAPY: CPT

## 2019-08-22 PROCEDURE — 25010000003 CEFAZOLIN IN DEXTROSE 2-4 GM/100ML-% SOLUTION: Performed by: ORTHOPAEDIC SURGERY

## 2019-08-22 RX ORDER — OXYCODONE HYDROCHLORIDE AND ACETAMINOPHEN 5; 325 MG/1; MG/1
1 TABLET ORAL EVERY 4 HOURS PRN
Qty: 40 TABLET | Refills: 0 | Status: SHIPPED | OUTPATIENT
Start: 2019-08-22 | End: 2019-08-29 | Stop reason: SDUPTHER

## 2019-08-22 RX ADMIN — MORPHINE SULFATE 4 MG: 4 INJECTION INTRAVENOUS at 11:27

## 2019-08-22 RX ADMIN — OXYCODONE HYDROCHLORIDE AND ACETAMINOPHEN 1 TABLET: 5; 325 TABLET ORAL at 05:07

## 2019-08-22 RX ADMIN — SODIUM CHLORIDE, PRESERVATIVE FREE 3 ML: 5 INJECTION INTRAVENOUS at 20:32

## 2019-08-22 RX ADMIN — APIXABAN 2.5 MG: 2.5 TABLET, FILM COATED ORAL at 08:02

## 2019-08-22 RX ADMIN — LEVOTHYROXINE SODIUM 75 MCG: 75 TABLET ORAL at 05:07

## 2019-08-22 RX ADMIN — APIXABAN 2.5 MG: 2.5 TABLET, FILM COATED ORAL at 20:32

## 2019-08-22 RX ADMIN — CEFAZOLIN SODIUM 2 G: 2 INJECTION, SOLUTION INTRAVENOUS at 00:08

## 2019-08-22 RX ADMIN — OXYCODONE HYDROCHLORIDE AND ACETAMINOPHEN 1 TABLET: 5; 325 TABLET ORAL at 09:06

## 2019-08-22 RX ADMIN — OXYCODONE HYDROCHLORIDE AND ACETAMINOPHEN 1 TABLET: 5; 325 TABLET ORAL at 17:54

## 2019-08-22 RX ADMIN — OXYCODONE HYDROCHLORIDE AND ACETAMINOPHEN 1 TABLET: 5; 325 TABLET ORAL at 22:02

## 2019-08-22 RX ADMIN — PANTOPRAZOLE SODIUM 40 MG: 40 TABLET, DELAYED RELEASE ORAL at 05:07

## 2019-08-22 RX ADMIN — POTASSIUM CHLORIDE AND SODIUM CHLORIDE 50 ML/HR: 900; 150 INJECTION, SOLUTION INTRAVENOUS at 09:06

## 2019-08-22 RX ADMIN — SODIUM CHLORIDE, PRESERVATIVE FREE 3 ML: 5 INJECTION INTRAVENOUS at 08:02

## 2019-08-22 RX ADMIN — OXYCODONE HYDROCHLORIDE AND ACETAMINOPHEN 1 TABLET: 5; 325 TABLET ORAL at 14:01

## 2019-08-22 RX ADMIN — PAROXETINE HYDROCHLORIDE 20 MG: 20 TABLET, FILM COATED ORAL at 05:07

## 2019-08-22 RX ADMIN — DOCUSATE SODIUM 100 MG: 100 CAPSULE, LIQUID FILLED ORAL at 22:02

## 2019-08-22 NOTE — PLAN OF CARE
Problem: Patient Care Overview  Goal: Plan of Care Review  Outcome: Ongoing (interventions implemented as appropriate)   08/22/19 7280   Coping/Psychosocial   Plan of Care Reviewed With patient;family   OTHER   Outcome Summary VSS. Patient ambulated in [place] x2 with PT and multiple times to bathroom to void. Pain controlled with PO pain meds. Expect patient to be d/c tomorrow. Will continue to monitor.    Plan of Care Review   Progress improving

## 2019-08-22 NOTE — THERAPY TREATMENT NOTE
Patient Name: Eli Clayton  : 1940    MRN: [id number]                              Today's Date: 2019       Admit Date: 2019    Visit Dx:     ICD-10-CM ICD-9-CM   1. Impaired functional mobility, balance, gait, and endurance Z74.09 V49.89   2. Primary osteoarthritis of left knee M17.12 715.16   3. Impaired mobility and ADLs Z74.09 799.89     Patient Active Problem List   Diagnosis   • Primary osteoarthritis of left knee   • Status post total left knee replacement   • Hypothyroid   • Leukocytosis, mild, likely reactive   • Acute blood loss anemia, mild, likely reactive   • Acute postoperative pain     Past Medical History:   Diagnosis Date   • Anesthesia complication     decreased oxygen and heart rate after knee surgery    • Arthritis     osteoarthritis   • Cancer (CMS/HCC)     cervical and skin   • Disease of thyroid gland    • GERD (gastroesophageal reflux disease)    • Hard of hearing     has hearing aid but does not wear it    • History of transfusion    • Interstitial cystitis    • PONV (postoperative nausea and vomiting)    • Wears dentures    • Wears glasses      Past Surgical History:   Procedure Laterality Date   • COLONOSCOPY     • EYE SURGERY      cataracts removed    • HYSTERECTOMY     • KNEE SURGERY Right 2009    replacement    • SKIN BIOPSY     • TOTAL KNEE ARTHROPLASTY Left 2019    Procedure: TOTAL KNEE ARTHROPLASTY LEFT;  Surgeon: Tristan Gonzalez MD;  Location: [facility];  Service: Orthopedics     General Information     Row Name 19 1626 19 1204       PT Evaluation Time/Intention    Mode of Treatment  individual therapy;physical therapy  -EJ  individual therapy;physical therapy  -EJ    Row Name 19 0755          PT Evaluation Time/Intention    Subjective Information  complains of;pain  -LC     Document Type  evaluation  -LC     Mode of Treatment  individual therapy;occupational therapy  -LC     Patient Effort  good  -LC     Row Name 19 1624  08/22/19 1204       General Information    Patient Profile Reviewed?  yes  -EJ  yes  -EJ    Existing Precautions/Restrictions  fall;other (see comments) AC nerve block, WBAT L TKA  -EJ  fall;other (see comments) AC nerve block, WBAT L TKA  -EJ    Row Name 08/22/19 0755          General Information    Patient Profile Reviewed?  yes  -LC     Onset of Illness/Injury or Date of Surgery  08/21/19  -     Referring Physician  MD Lisa  -     Patient Observations  alert;cooperative;agree to therapy  -LC     Patient/Family Observations  Daughter present   -     General Observations of Patient  Pt. in bed, AC nerve catheter and IV intact  -LC     Prior Level of Function  independent:;grooming;feeding;min assist:;all household mobility;transfer;ADL's  -LC     Equipment Currently Used at Home  raised toilet;sock aid;walker, rolling;shoe horn reacher, dressing stick  -LC     Pertinent History of Current Functional Problem  Pt. presents with L knee pain/dysfunction that failed to improve with conservative measures. Pt. now POD 1 s/p L TKA.   -LC     Existing Precautions/Restrictions  fall;other (see comments) AC nerve block, WBAT L TKA  -LC     Equipment Issued to Patient  leg   -     Risks Reviewed  patient and family:;nausea/vomiting;dizziness;increased drainage;increased discomfort  -LC     Benefits Reviewed  patient and family:;improve function;increase independence;increase strength  -LC     Barriers to Rehab  previous functional deficit  -LC     Row Name 08/22/19 0755          Relationship/Environment    Primary Source of Support/Comfort  child(mari)  -LC     Lives With  alone  -LC     Row Name 08/22/19 0755          Resource/Environmental Concerns    Current Living Arrangements  home/apartment/condo  -     Resource/Environmental Concerns  none  -LC     Row Name 08/22/19 0755          Home Main Entrance    Number of Stairs, Main Entrance  none  -LC     Row Name 08/22/19 1626 08/22/19 1204       Cognitive  Assessment/Intervention- PT/OT    Orientation Status (Cognition)  oriented x 4  -EJ  oriented x 4  -EJ    Row Name 08/22/19 0755          Cognitive Assessment/Intervention- PT/OT    Affect/Mental Status (Cognitive)  WFL  -LC     Orientation Status (Cognition)  oriented x 4  -LC     Row Name 08/22/19 1626 08/22/19 1204       Safety Issues, Functional Mobility    Impairments Affecting Function (Mobility)  pain;strength;range of motion (ROM)  -EJ  pain;strength  -EJ      User Key  (r) = Recorded By, (t) = Taken By, (c) = Cosigned By    Initials Name Provider Type    EJ Haley Kim, PT Physical Therapist    LC Edel Toribio, OT Occupational Therapist        Mobility     Row Name 08/22/19 1626 08/22/19 0755       Bed Mobility Assessment/Treatment    Bed Mobility Assessment/Treatment  --  supine-sit  -    [place] Level (Bed Mobility)  supervision  -  --    Supine-Sit [place] (Bed Mobility)  --  verbal cues;supervision  -    Bed Mobility, Safety Issues  --  decreased use of legs for bridging/pushing  -    Assistive Device (Bed Mobility)  bed rails;leg ;head of bed elevated  -EJ  bed rails;leg ;head of bed elevated  -    Row Name 08/22/19 0755          Transfer Assessment/Treatment    Transfer Assessment/Treatment  bed-chair transfer;sit-stand transfer;stand-sit transfer  -     Comment (Transfers)  vc'ing to step LLE out to improve comfort w/ transfer. vc's to push up from surface to stand and reach back for surface to lower to sit. Pt. managed AC nerver block.   -     Stand-Sit [place] (Transfers)  verbal cues;contact guard  -     Row Name 08/22/19 0755          Bed-Chair Transfer    Bed-Chair [place] (Transfers)  contact guard;verbal cues  -     Assistive Device (Bed-Chair Transfers)  walker, front-wheeled  -     Row Name 08/22/19 1626 08/22/19 1205       Sit-Stand Transfer    Sit-Stand [place] (Transfers)  contact guard  -EJ  contact guard  -    Assistive  Device (Sit-Stand Transfers)  walker, front-wheeled  -EJ  walker, front-wheeled  -EJ    Row Name 08/22/19 0755          Sit-Stand Transfer    Sit-Stand [place] (Transfers)  contact guard;verbal cues  -LC     Assistive Device (Sit-Stand Transfers)  walker, front-wheeled  -LC     Row Name 08/22/19 1636 08/22/19 1626       Gait/Stairs Assessment/Training    76589 - Gait Training Minutes   13  -EJ  --    [place] Level (Gait)  --  contact guard  -EJ    Assistive Device (Gait)  --  walker, front-wheeled  -EJ    Distance in Feet (Gait)  --  400  -EJ    Deviations/Abnormal Patterns (Gait)  --  antalgic;right sided deviations;stride length decreased  -EJ    Left Sided Gait Deviations  --  weight shift ability decreased  -EJ    Comment (Gait/Stairs)  --  cued for increased step length RLE, upright posture/forward gaze. Pt with good improvement  -    Row Name 08/22/19 1325 08/22/19 1205       Gait/Stairs Assessment/Training    40592 - Gait Training Minutes   20  -EJ  --    [place] Level (Gait)  --  contact guard  -EJ    Assistive Device (Gait)  --  walker, front-wheeled  -EJ    Distance in Feet (Gait)  --  170  -EJ    Deviations/Abnormal Patterns (Gait)  --  antalgic  -EJ    Bilateral Gait Deviations  --  forward flexed posture;weight shift ability decreased shoulder elevation, holds RWx out in front of body  -EJ    Right Sided Gait Deviations  --  -- step length decreased  -EJ    Number of Steps (Stairs)  --  6  -EJ    Ascending Technique (Stairs)  --  step-to-step  -EJ    Descending Technique (Stairs)  --  step-to-step  -EJ    Comment (Gait/Stairs)  --  Pt cued for upright posture, increased step length RLE. Good improvement with cueing, but needs reminders. On stairs pt performs 3 with L hand rail and R hand on cane. Then performs remaining stairs with HHA +cane (Dtr provided assist during 2/3 stairs).  -    Row Name 08/22/19 1626 08/22/19 1205       Mobility Assessment/Intervention    Left Lower  Extremity (Weight-bearing Status)  weight-bearing as tolerated (WBAT)  -EJ  weight-bearing as tolerated (WBAT)  -EJ      User Key  (r) = Recorded By, (t) = Taken By, (c) = Cosigned By    Initials Name Provider Type    Haley Lane, PT Physical Therapist    Edel Quach, OT Occupational Therapist        Obj/Interventions     Row Name 08/22/19 1316 08/22/19 0755       General ROM    GENERAL ROM COMMENTS  LLE knee lacking 3 degrees extension to 75 degrees flexion  -EJ  BUE WFL  -    Row Name 08/22/19 0755          MMT (Manual Muscle Testing)    General MMT Comments  BUE WFL  -     Row Name 08/22/19 1630 08/22/19 1316       Therapeutic Exercise    Lower Extremity (Therapeutic Exercise)  SLR (straight leg raise), left;SAQ (short arc quad), left;quad sets, left;LAQ (long arc quad), left;heel slides, left;hamstring sets, left  -EJ  LAQ (long arc quad), left;SAQ (short arc quad), left;quad sets, left;SLR (straight leg raise), right;heel slides, right  -EJ    Lower Extremity Range of Motion (Therapeutic Exercise)  knee flexion/extension, left;ankle dorsiflexion/plantar flexion, bilateral  -EJ  knee flexion/extension, left;ankle dorsiflexion/plantar flexion, bilateral  -EJ    Position (Therapeutic Exercise)  --  seated  -EJ    Sets/Reps (Therapeutic Exercise)  1/15  -EJ  1/15  -EJ      User Key  (r) = Recorded By, (t) = Taken By, (c) = Cosigned By    Initials Name Provider Type    Haley Lane PT Physical Therapist    Edel Quach, OT Occupational Therapist        Goals/Plan    No documentation.       Clinical Impression     Row Name 08/22/19 1320          Pain Assessment    Additional Documentation  Pain Scale: FACES Pre/Post-Treatment (Group)  -     Row Name 08/22/19 1631 08/22/19 1320       Pain Scale: Numbers Pre/Post-Treatment    Pain Scale: Numbers, Pretreatment  3/10  -EJ  4/10  -EJ    Pain Scale: Numbers, Post-Treatment  4/10  -EJ  5/10  -EJ    Pain Location - Side  Left  -EJ  Left   -EJ    Pain Location - Orientation  anterior  -EJ  anterior  -EJ    Pain Location  knee  -EJ  knee  -EJ    Pre/Post Treatment Pain Comment  --  low thigh, upper knee  -EJ    Pain Intervention(s)  Ambulation/increased activity;Repositioned;Cold applied  -EJ  Repositioned;Ambulation/increased activity nerve block at 14  -EJ    Row Name 08/22/19 0755          Pain Scale: Numbers Pre/Post-Treatment    Pain Scale: Numbers, Pretreatment  7/10  -LC     Pain Scale: Numbers, Post-Treatment  7/10  -LC     Pain Location - Orientation  generalized  -LC     Pain Location  knee  -LC     Pre/Post Treatment Pain Comment  Nsg notified of pain  -LC     Pain Intervention(s)  Cold applied;Repositioned  -LC     Row Name 08/22/19 1634 08/22/19 1401       Plan of Care Review    Plan of Care Reviewed With  patient  -EJ  patient;family  -SC (r) RG (t) SC (c)    Row Name 08/22/19 1322 08/22/19 1200       Plan of Care Review    Plan of Care Reviewed With  patient  -EJ  patient;family  -SC (r) RG (t) SC (c)    Row Name 08/22/19 1000 08/22/19 0800       Plan of Care Review    Plan of Care Reviewed With  patient;family  -SC (r) RG (t) SC (c)  patient;family  -SC (r) RG (t) SC (c)    Row Name 08/22/19 0755          Plan of Care Review    Plan of Care Reviewed With  family;patient;daughter  -LC     Row Name 08/22/19 1631 08/22/19 1320       Positioning and Restraints    Pre-Treatment Position  in bed  -EJ  sitting in chair/recliner  -EJ    Post Treatment Position  bed  -EJ  chair  -EJ    In Bed  fowlers;with family/caregiver;call light within reach;encouraged to call for assist  -EJ  --    In Chair  --  reclined;call light within reach;encouraged to call for assist;exit alarm on;with family/caregiver  -EJ    Row Name 08/22/19 0755          Positioning and Restraints    Pre-Treatment Position  in bed  -LC     Post Treatment Position  chair  -LC     In Chair  sitting;call light within reach;encouraged to call for assist;exit alarm on;with  family/caregiver;notified nsg;reclined  -       User Key  (r) = Recorded By, (t) = Taken By, (c) = Cosigned By    Initials Name Provider Type    Haley Lane, PT Physical Therapist    Piedad Garcia RN Registered Nurse    Rossana Camacho RN Registered Nurse    Edel Quach, OT Occupational Therapist        Outcome Measures     Row Name 08/22/19 1636 08/22/19 1324       How much help from another person do you currently need...    Turning from your back to your side while in flat bed without using bedrails?  4  -EJ  4  -EJ    Moving from lying on back to sitting on the side of a flat bed without bedrails?  4  -EJ  3  -EJ    Moving to and from a bed to a chair (including a wheelchair)?  3  -EJ  3  -EJ    Standing up from a chair using your arms (e.g., wheelchair, bedside chair)?  3  -EJ  3  -EJ    Climbing 3-5 steps with a railing?  3  -EJ  3  -EJ    To walk in hospital room?  3  -EJ  3  -EJ    AM-PAC 6 Clicks Score (PT)  20  -EJ  19  -EJ    Row Name 08/22/19 1636 08/22/19 1324       Functional Assessment    Outcome Measure Options  AM-PAC 6 Clicks Basic Mobility (PT)  -EJ  AM-PAC 6 Clicks Basic Mobility (PT)  -    Row Name 08/22/19 0755          Functional Assessment    Outcome Measure Options  AM-PAC 6 Clicks Daily Activity (OT)  -       User Key  (r) = Recorded By, (t) = Taken By, (c) = Cosigned By    Initials Name Provider Type    Haley Lane PT Physical Therapist    Edel Quach, CECILY Occupational Therapist        Physical Therapy Education     Title: PT OT SLP Therapies (In Progress)     Topic: Physical Therapy (Done)     Point: Mobility training (Done)     Learning Progress Summary           Patient Acceptance, E, VU,DU by KENISHA at 8/22/2019  4:34 PM    Acceptance, E, VU,NR by KENISHA at 8/22/2019  1:21 PM    Acceptance, E,TB, VU,NR by KENISHA at 8/21/2019  3:26 PM                   Point: Home exercise program (Done)     Learning Progress Summary           Patient Acceptance,  E, VU,DU by  at 8/22/2019  4:34 PM    Acceptance, E, VU,NR by EJ at 8/22/2019  1:21 PM                   Point: Body mechanics (Done)     Learning Progress Summary           Patient Acceptance, E, VU,DU by  at 8/22/2019  4:34 PM    Acceptance, E, VU,NR by EJ at 8/22/2019  1:21 PM    Acceptance, E,TB, VU,NR by  at 8/21/2019  3:26 PM                   Point: Precautions (Done)     Learning Progress Summary           Patient Acceptance, E, VU,DU by  at 8/22/2019  4:34 PM    Acceptance, E, VU,NR by EJ at 8/22/2019  1:21 PM    Acceptance, E,TB, VU,NR by  at 8/21/2019  3:26 PM                               User Key     Initials Effective Dates Name Provider Type [facility] 11/20/18 -  Haley Kim PT Physical Therapist PT              PT Recommendation and Plan     Outcome Summary/Treatment Plan (PT)  Anticipated Discharge Disposition (PT): home with assist, home with home health  Plan of Care Reviewed With: patient  Progress: improving  Outcome Summary: Pt ambulates 450 ft with CGA, progressing toward SBA with RWx. Pt demonstrates improvements with gait and tolerates HEP well. Pt expected to d/c home withHHPT and assist from dtr tomorrow.     Time Calculation:   PT Charges     Row Name 08/22/19 1636 08/22/19 1325          Time Calculation    Start Time  1556  -EJ  1039  -EJ     PT Received On  08/22/19  -EJ  08/22/19  -EJ     PT Goal Re-Cert Due Date  08/31/19  -EJ  08/31/19  -EJ        Time Calculation- PT    Total Timed Code Minutes- PT  26 minute(s)  -EJ  36 minute(s)  -EJ        Timed Charges    05540 - PT Therapeutic Exercise Minutes  13  -EJ  16  -EJ     12467 - Gait Training Minutes   13  -EJ  20  -EJ       User Key  (r) = Recorded By, (t) = Taken By, (c) = Cosigned By    Initials Name Provider Type     Haley Kim PT Physical Therapist        Therapy Charges for Today     Code Description Service Date Service Provider Modifiers Qty    86987164816 HC PT EVAL MOD COMPLEXITY 4  8/21/2019 Haley Kim, PT GP 1    89825564589 HC PT THER SUPP EA 15 MIN 8/21/2019 Haley Kim, PT GP 2    94234484073 HC PT THER PROC EA 15 MIN 8/22/2019 Haley Kim, PT GP 1    79826680292 HC GAIT TRAINING EA 15 MIN 8/22/2019 Haley Kim, PT GP 1    41314399710 HC PT THER PROC EA 15 MIN 8/22/2019 Haley Kim, PT  1    87099157307 HC GAIT TRAINING EA 15 MIN 8/22/2019 Haley Kim, PT  1          PT G-Codes  Outcome Measure Options: AM-PAC 6 Clicks Basic Mobility (PT)  AM-PAC 6 Clicks Score (PT): 20  AM-PAC 6 Clicks Score (OT): 17    Haley Munoz, PT  8/22/2019

## 2019-08-22 NOTE — THERAPY EVALUATION
Acute Care - Occupational Therapy Initial Evaluation  [facility]     Patient Name: Eli Clayton  : 1940  MRN: [id number]  Today's Date: 2019  Onset of Illness/Injury or Date of Surgery: 19  Date of Referral to OT: 19  Referring Physician: MD Lisa    Admit Date: 2019       ICD-10-CM ICD-9-CM   1. Impaired functional mobility, balance, gait, and endurance Z74.09 V49.89   2. Primary osteoarthritis of left knee M17.12 715.16   3. Impaired mobility and ADLs Z74.09 799.89     Patient Active Problem List   Diagnosis   • Primary osteoarthritis of left knee   • Status post total left knee replacement   • Hypothyroid     Past Medical History:   Diagnosis Date   • Anesthesia complication     decreased oxygen and heart rate after knee surgery    • Arthritis     osteoarthritis   • Cancer (CMS/HCC)     cervical and skin   • Disease of thyroid gland    • GERD (gastroesophageal reflux disease)    • Hard of hearing     has hearing aid but does not wear it    • History of transfusion    • Interstitial cystitis    • PONV (postoperative nausea and vomiting)    • Wears dentures    • Wears glasses      Past Surgical History:   Procedure Laterality Date   • COLONOSCOPY     • EYE SURGERY      cataracts removed    • HYSTERECTOMY     • KNEE SURGERY Right 2009    replacement    • SKIN BIOPSY            OT ASSESSMENT FLOWSHEET (last 12 hours)      Occupational Therapy Evaluation     Row Name 19 0755                   OT Evaluation Time/Intention    Subjective Information  complains of;pain  -LC        Document Type  evaluation  -        Mode of Treatment  individual therapy;occupational therapy  -LC        Patient Effort  good  -LC           General Information    Patient Profile Reviewed?  yes  -LC        Onset of Illness/Injury or Date of Surgery  19  -        Referring Physician  MD Lisa  -        Patient Observations  alert;cooperative;agree to therapy  -        Patient/Family  Observations  Daughter present   -        General Observations of Patient  Pt. in bed, AC nerve catheter and IV intact  -        Prior Level of Function  independent:;grooming;feeding;min assist:;all household mobility;transfer;ADL's  -        Equipment Currently Used at Home  raised toilet;sock aid;walker, rolling;shoe horn reacher, dressing stick  -        Pertinent History of Current Functional Problem  Pt. presents with L knee pain/dysfunction that failed to improve with conservative measures. Pt. now POD 1 s/p L TKA.   -        Existing Precautions/Restrictions  fall;other (see comments) AC nerve block, WBAT L TKA  -LC        Equipment Issued to Patient  leg   -        Risks Reviewed  patient and family:;nausea/vomiting;dizziness;increased drainage;increased discomfort  -        Benefits Reviewed  patient and family:;improve function;increase independence;increase strength  -        Barriers to Rehab  previous functional deficit  -           Relationship/Environment    Primary Source of Support/Comfort  child(mari)  -        Lives With  alone  -           Resource/Environmental Concerns    Current Living Arrangements  home/apartment/condo  -        Resource/Environmental Concerns  none  -           Home Main Entrance    Number of Stairs, Main Entrance  none  -           Cognitive Assessment/Interventions    Additional Documentation  Cognitive Assessment/Intervention (Group)  -           Cognitive Assessment/Intervention- PT/OT    Affect/Mental Status (Cognitive)  WFL  -        Orientation Status (Cognition)  oriented x 4  -LC           Bed Mobility Assessment/Treatment    Bed Mobility Assessment/Treatment  supine-sit  -        Supine-Sit [place] (Bed Mobility)  verbal cues;supervision  -        Bed Mobility, Safety Issues  decreased use of legs for bridging/pushing  -        Assistive Device (Bed Mobility)  bed rails;leg ;head of bed elevated  -            Transfer Assessment/Treatment    Transfer Assessment/Treatment  bed-chair transfer;sit-stand transfer;stand-sit transfer  -        Comment (Transfers)  vc'ing to step LLE out to improve comfort w/ transfer. vc's to push up from surface to stand and reach back for surface to lower to sit. Pt. managed AC nerver block.   -LC           Bed-Chair Transfer    Bed-Chair [place] (Transfers)  contact guard;verbal cues  -        Assistive Device (Bed-Chair Transfers)  walker, front-wheeled  -           Sit-Stand Transfer    Sit-Stand [place] (Transfers)  contact guard;verbal cues  -        Assistive Device (Sit-Stand Transfers)  walker, front-wheeled  -           Stand-Sit Transfer    Stand-Sit [place] (Transfers)  verbal cues;contact guard  -        Assistive Device (Stand-Sit Transfers)  walker, front-wheeled  -           ADL Assessment/Intervention    40945 - OT Self Care/Mgmt Minutes  26  -        BADL Assessment/Intervention  lower body dressing  -           Lower Body Dressing Assessment/Training    Lower Body Dressing [place] Level  doff;don;pants/bottoms;shoes/slippers;socks  -        Assistive Devices (Lower Body Dressing)  dressing stick;long-handled shoe horn;reacher;sock-aid  -        Lower Body Dressing Position  supported sitting  -        Comment (Lower Body Dressing)  Educated pt. and daughter on use of AE for LBD. Demonstrated use of dressing stick, LHSH, reacher, and sock aid. Due to pain, pt requested to trial AE in next session. Educated pt. on AC nerve block management when donning and doffing pants/undergarments to ensure safety.   -           BADL Safety/Performance    Impairments, BADL Safety/Performance  endurance/activity tolerance;range of motion;pain;strength  -        Skilled BADL Treatment/Intervention  adaptive equipment training;BADL process/adaptation training  -           General ROM    GENERAL ROM COMMENTS  BUE WFL  -           MMT (Manual  Muscle Testing)    General MMT Comments  BUE WFL  -LC           Positioning and Restraints    Pre-Treatment Position  in bed  -LC        Post Treatment Position  chair  -LC        In Chair  sitting;call light within reach;encouraged to call for assist;exit alarm on;with family/caregiver;notified nsg;reclined  -LC           Pain Assessment    Additional Documentation  Pain Scale: Numbers Pre/Post-Treatment (Group)  -LC           Pain Scale: Numbers Pre/Post-Treatment    Pain Scale: Numbers, Pretreatment  7/10  -LC        Pain Scale: Numbers, Post-Treatment  7/10  -LC        Pain Location - Orientation  generalized  -LC        Pain Location  knee  -LC        Pre/Post Treatment Pain Comment  Nsg notified of pain  -LC        Pain Intervention(s)  Cold applied;Repositioned  -LC           Wound 08/21/19 0847 Left knee Incision    Wound - Properties Group Date first assessed: 08/21/19  -BR Time first assessed: 0847  -BR Side: Left  -BR Location: knee  -BR Primary Wound Type: Incision  -BR       Clinical Impression (OT)    Date of Referral to OT  08/22/19  -        OT Diagnosis  impaired ADLs  -        Patient/Family Goals Statement (OT Eval)  To return home  -        Therapy Frequency (OT Eval)  daily  -        Anticipated Discharge Disposition (OT)  home with assist;home with home health  -           Planned OT Interventions    Planned Therapy Interventions (OT Eval)  activity tolerance training;adaptive equipment training;BADL retraining  -           OT Goals    Transfer Goal Selection (OT)  transfer, OT goal 1  -LC        Dressing Goal Selection (OT)  dressing, OT goal 1  -        Toileting Goal Selection (OT)  toileting, OT goal 1  -LC           Transfer Goal 1 (OT)    Activity/Assistive Device (Transfer Goal 1, OT)  bed-to-chair/chair-to-bed;toilet  -        [place] Level/Cues Needed (Transfer Goal 1, OT)  supervision required;verbal cues required  -        Time Frame (Transfer Goal 1, OT)  5  days  -        Progress/Outcome (Transfer Goal 1, OT)  goal ongoing  -           Dressing Goal 1 (OT)    Activity/Assistive Device (Dressing Goal 1, OT)  lower body dressing  -        [place]/Cues Needed (Dressing Goal 1, OT)  set-up required;verbal cues required  -        Time Frame (Dressing Goal 1, OT)  5 days  -        Progress/Outcome (Dressing Goal 1, OT)  goal ongoing  -           Toileting Goal 1 (OT)    Activity/Device (Toileting Goal 1, OT)  grab bar/safety frame;raised toilet seat;adjust/manage clothing;perform perineal hygiene  -        [place] Level/Cues Needed (Toileting Goal 1, OT)  tactile cues required;verbal cues required  -        Time Frame (Toileting Goal 1, OT)  5 days  -        Progress/Outcome (Toileting Goal 1, OT)  goal ongoing  -          User Key  (r) = Recorded By, (t) = Taken By, (c) = Cosigned By    Initials Name Effective Dates    Anna Aaron RN 05/15/19 -     Edel Quach OT 07/18/19 -          Occupational Therapy Education     Title: PT OT SLP Therapies (In Progress)     Topic: Occupational Therapy (In Progress)     Point: ADL training (Done)     Description: Instruct learner(s) on proper safety adaptation and remediation techniques during self care or transfers.   Instruct in proper use of assistive devices.    Learning Progress Summary           Patient Acceptance, E, VU by  at 8/22/2019  7:55 AM   Family Acceptance, E, VU by  at 8/22/2019  7:55 AM                   Point: Precautions (Done)     Description: Instruct learner(s) on prescribed precautions during self-care and functional transfers.    Learning Progress Summary           Patient Acceptance, E, VU by TODD at 8/22/2019  7:55 AM   Family Acceptance, E, VU by  at 8/22/2019  7:55 AM                   Point: Body mechanics (Done)     Description: Instruct learner(s) on proper positioning and spine alignment during self-care, functional mobility activities and/or exercises.     Learning Progress Summary           Patient Acceptance, E, VU by  at 8/22/2019  7:55 AM   Family Acceptance, E, VU by  at 8/22/2019  7:55 AM                               User Key     Initials Effective Dates Name Provider Type [facility] 07/18/19 -  Edel Toribio, CECILY Occupational Therapist OT                  OT Recommendation and Plan  Outcome Summary/Treatment Plan (OT)  Anticipated Discharge Disposition (OT): home with assist, home with home health  Planned Therapy Interventions (OT Eval): activity tolerance training, adaptive equipment training, BADL retraining  Therapy Frequency (OT Eval): daily  Plan of Care Review  Plan of Care Reviewed With: family, patient, daughter  Plan of Care Reviewed With: family, patient, daughter  Outcome Summary: Pt. educated in adductor canal nerve catherter management and use of AE for LBD. Pt. refused to trial AE at this time secondary t00o pain. Educated Pt. on body mechanics and sequence of txfs to improve comfort during transitions. Pt. demo'd supine<>sit w/ SUP, bed<>chair w/ CGA and vc'ing.     Outcome Measures     Row Name 08/22/19 0755             How much help from another is currently needed...    Putting on and taking off regular lower body clothing?  2  -LC      Bathing (including washing, rinsing, and drying)  2  -LC      Toileting (which includes using toilet bed pan or urinal)  3  -LC      Putting on and taking off regular upper body clothing  3  -LC      Taking care of personal grooming (such as brushing teeth)  3  -LC      Eating meals  4  -      AM-PAC 6 Clicks Score (OT)  17  -         Functional Assessment    Outcome Measure Options  AM-PAC 6 Clicks Daily Activity (OT)  -        User Key  (r) = Recorded By, (t) = Taken By, (c) = Cosigned By    Initials Name Provider Type     Edel Toribio, CECILY Occupational Therapist          Time Calculation:   Time Calculation- OT     Row Name 08/22/19 0755             Time Calculation- OT    OT Start Time   0755  -      OT Received On  08/22/19  -      OT Goal Re-Cert Due Date  09/01/19  -         Timed Charges    32392 - OT Self Care/Mgmt Minutes  26  -        User Key  (r) = Recorded By, (t) = Taken By, (c) = Cosigned By    Initials Name Provider Type     Edel Toribio OT Occupational Therapist        Therapy Charges for Today     Code Description Service Date Service Provider Modifiers Qty    56588690403  OT SELF CARE/MGMT/TRAIN EA 15 MIN 8/22/2019 Edel Toribio OT  2    [id number]  OT EVAL MOD COMPLEXITY 3 8/22/2019 Edel Toribio OT GO 1               Edel Toribio OT  8/22/2019

## 2019-08-22 NOTE — PLAN OF CARE
Problem: Patient Care Overview  Goal: Plan of Care Review  Outcome: Ongoing (interventions implemented as appropriate)   08/22/19 1322   Coping/Psychosocial   Plan of Care Reviewed With patient   OTHER   Outcome Summary Pt ambulates in [place]opez x 170 ft with CGA, progressing toward SBA, using RWx. Pt goes up/down 6 steps total with HHA,cane. Pt's L knee ROM is lacking 3 degrees from 0, to 75 degrees flexion. PT to continue as able.   Plan of Care Review   Progress improving

## 2019-08-22 NOTE — PROGRESS NOTES
Orthopaedic Surgery Progress Note      LOS: 0 days   Patient Care Team:  Heri Centeno MD as PCP - General (Pediatrics)    POD 1    Subjective     Interval History:   Patient underwent uneventful left total knee arthroplasty.  She is having pain that was fairly severe at 5 AM.  This seems to improve after pain medication is given.    Objective     Vital Signs:  Temp (24hrs), Av.9 °F (36.6 °C), Min:97.3 °F (36.3 °C), Max:98.5 °F (36.9 °C)    /57 (BP Location: Right arm, Patient Position: Lying)   Pulse 78   Temp 98.3 °F (36.8 °C) (Oral)   Resp 16   SpO2 94%   Breastfeeding? No     Labs:  Lab Results (last 24 hours)     Procedure Component Value Units Date/Time    CBC & Differential [[id number]] Collected:  19    Specimen:  Blood Updated:  19    Narrative:       The following orders were created for panel order CBC & Differential.  Procedure                               Abnormality         Status                     ---------                               -----------         ------                     CBC Auto Differential[[id number]]        Abnormal            Final result                 Please view results for these tests on the individual orders.    CBC Auto Differential [[id number]]  (Abnormal) Collected:  19    Specimen:  Blood Updated:  19     WBC 13.30 10*3/mm3      RBC 3.58 10*6/mm3      Hemoglobin 10.8 g/dL      Hematocrit 34.5 %      MCV 96.4 fL      MCH 30.2 pg      MCHC 31.3 g/dL      RDW 12.8 %      RDW-SD 45.8 fl      MPV 9.9 fL      Platelets 292 10*3/mm3      Neutrophil % 77.3 %      Lymphocyte % 10.6 %      Monocyte % 11.4 %      Eosinophil % 0.0 %      Basophil % 0.2 %      Immature Grans % 0.5 %      Neutrophils, Absolute 10.28 10*3/mm3      Lymphocytes, Absolute 1.41 10*3/mm3      Monocytes, Absolute 1.52 10*3/mm3      Eosinophils, Absolute 0.00 10*3/mm3      Basophils, Absolute 0.02 10*3/mm3      Immature Grans, Absolute  0.07 10*3/mm3      nRBC 0.0 /100 WBC     Basic Metabolic Panel [[id number]]  (Abnormal) Collected:  08/22/19 0533    Specimen:  Blood Updated:  08/22/19 0631     Glucose 134 mg/dL      BUN 9 mg/dL      Creatinine 0.79 mg/dL      Sodium 138 mmol/L      Potassium 4.3 mmol/L      Chloride 108 mmol/L      CO2 23.0 mmol/L      Calcium 8.3 mg/dL      eGFR Non African Amer 71 mL/min/1.73      BUN/Creatinine Ratio 11.4     Anion Gap 7.0 mmol/L     Narrative:       GFR Normal >60  Chronic Kidney Disease <60  Kidney Failure <15          Physical Exam:  EHL, FHL, gastroc soleus, and tibialis anterior are intact  Toes are pink and warm  Surgical dressing is in place  Palpable dorsalis pedis pulse  Calf is soft and non tender    Postoperative x-ray has been reviewed and looks acceptable    Assessment/Plan     Postoperative day number 1 status post left total knee arthroplasty.    Labs: Hematocrit 35, platelets 292,000, creatinine within normal limits    Pain Control: Suboptimal.  We will see how she does today and if her pain is under control, anticipate discharge tomorrow with indwelling abductor canal block.    PT and OT     DVT prophylaxis: Begin Eliquis today.  Continue for 2 weeks.  Then transition to full dose aspirin for 1 month    Discharge planning: Anticipate discharge home tomorrow    Upon discharge, patient will need follow-up with me in 3 weeks' time.  They will need home health for a short amount of time and then early transition to outpatient physical therapy.  Standard [place] dressing care instructions.  Do not remove.  DME per case management.    Admission Status:  I believe this patient meets INPATIENT status due to the need for care which can only be reasonably provided in an hospital setting such as aggressive/expedited ancillary services and/or consultation services, the necessity for IV medications, close physician monitoring and/or the possible need for procedures.  In such, I feel patient’s risk for  adverse outcomes and need for care warrant INPATIENT evaluation and predict the patient’s care encounter to likely last beyond 2 midnights.        Tristan Gonzalez MD  08/22/19  7:49 AM

## 2019-08-22 NOTE — PLAN OF CARE
Problem: Patient Care Overview  Goal: Plan of Care Review  Outcome: Ongoing (interventions implemented as appropriate)   08/22/19 1651   Coping/Psychosocial   Plan of Care Reviewed With patient   OTHER   Outcome Summary Pt ambulates 450 ft with CGA, progressing toward SBA with RWx. Pt demonstrates improvements with gait and tolerates HEP well. Pt expected to d/c home withHHPT and assist from dtr tomorrow.   Plan of Care Review   Progress improving

## 2019-08-22 NOTE — THERAPY TREATMENT NOTE
Patient Name: Eli Clayton  : 1940    MRN: [id number]                              Today's Date: 2019       Admit Date: 2019    Visit Dx:     ICD-10-CM ICD-9-CM   1. Impaired functional mobility, balance, gait, and endurance Z74.09 V49.89   2. Primary osteoarthritis of left knee M17.12 715.16   3. Impaired mobility and ADLs Z74.09 799.89     Patient Active Problem List   Diagnosis   • Primary osteoarthritis of left knee   • Status post total left knee replacement   • Hypothyroid   • Leukocytosis, mild, likely reactive   • Acute blood loss anemia, mild, likely reactive   • Acute postoperative pain     Past Medical History:   Diagnosis Date   • Anesthesia complication     decreased oxygen and heart rate after knee surgery    • Arthritis     osteoarthritis   • Cancer (CMS/HCC)     cervical and skin   • Disease of thyroid gland    • GERD (gastroesophageal reflux disease)    • Hard of hearing     has hearing aid but does not wear it    • History of transfusion    • Interstitial cystitis    • PONV (postoperative nausea and vomiting)    • Wears dentures    • Wears glasses      Past Surgical History:   Procedure Laterality Date   • COLONOSCOPY     • EYE SURGERY      cataracts removed    • HYSTERECTOMY     • KNEE SURGERY Right 2009    replacement    • SKIN BIOPSY     • TOTAL KNEE ARTHROPLASTY Left 2019    Procedure: TOTAL KNEE ARTHROPLASTY LEFT;  Surgeon: Tristan Gonzalez MD;  Location: [facility];  Service: Orthopedics     General Information     Row Name 19 1204 19 0755       PT Evaluation Time/Intention    Subjective Information  --  complains of;pain  -LC    Document Type  --  evaluation  -LC    Mode of Treatment  individual therapy;physical therapy  -EJ  individual therapy;occupational therapy  -LC    Patient Effort  --  good  -LC    Row Name 19 1204 19 0755       General Information    Patient Profile Reviewed?  yes  -EJ  yes  -LC    Onset of Illness/Injury or Date  of Surgery  --  08/21/19  -    Referring Physician  --  MD Lisa  -    Patient Observations  --  alert;cooperative;agree to therapy  -    Patient/Family Observations  --  Daughter present   -    General Observations of Patient  --  Pt. in bed, AC nerve catheter and IV intact  -    Prior Level of Function  --  independent:;grooming;feeding;min assist:;all household mobility;transfer;ADL's  -    Equipment Currently Used at Home  --  raised toilet;sock aid;walker, rolling;shoe horn reacher, dressing stick  -    Pertinent History of Current Functional Problem  --  Pt. presents with L knee pain/dysfunction that failed to improve with conservative measures. Pt. now POD 1 s/p L TKA.   -    Existing Precautions/Restrictions  fall;other (see comments) AC nerve block, WBAT L TKA  -EJ  fall;other (see comments) AC nerve block, WBAT L TKA  -LC    Equipment Issued to Patient  --  leg   -    Risks Reviewed  --  patient and family:;nausea/vomiting;dizziness;increased drainage;increased discomfort  -    Benefits Reviewed  --  patient and family:;improve function;increase independence;increase strength  -    Barriers to Rehab  --  previous functional deficit  -    Row Name 08/22/19 0755          Relationship/Environment    Primary Source of Support/Comfort  child(mari)  -     Lives With  alone  -     Row Name 08/22/19 0755          Resource/Environmental Concerns    Current Living Arrangements  home/apartment/condo  -     Resource/Environmental Concerns  none  -     Row Name 08/22/19 0755          Home Main Entrance    Number of Stairs, Main Entrance  none  -     Row Name 08/22/19 1204 08/22/19 0755       Cognitive Assessment/Intervention- PT/OT    Affect/Mental Status (Cognitive)  --  WFL  -    Orientation Status (Cognition)  oriented x 4  -EJ  oriented x 4  -LC    Row Name 08/22/19 1204          Safety Issues, Functional Mobility    Impairments Affecting Function (Mobility)   pain;strength  -       User Key  (r) = Recorded By, (t) = Taken By, (c) = Cosigned By    Initials Name Provider Type     Haley Kim, PT Physical Therapist    LC Edel Toribio OT Occupational Therapist        Mobility     Row Name 08/22/19 0755          Bed Mobility Assessment/Treatment    Bed Mobility Assessment/Treatment  supine-sit  -     Supine-Sit [place] (Bed Mobility)  verbal cues;supervision  -     Bed Mobility, Safety Issues  decreased use of legs for bridging/pushing  -     Assistive Device (Bed Mobility)  bed rails;leg ;head of bed elevated  -     Row Name 08/22/19 0755          Transfer Assessment/Treatment    Transfer Assessment/Treatment  bed-chair transfer;sit-stand transfer;stand-sit transfer  -     Comment (Transfers)  vc'ing to step LLE out to improve comfort w/ transfer. vc's to push up from surface to stand and reach back for surface to lower to sit. Pt. managed AC nerver block.   -     Stand-Sit [place] (Transfers)  verbal cues;contact guard  -     Row Name 08/22/19 0755          Bed-Chair Transfer    Bed-Chair [place] (Transfers)  contact guard;verbal cues  -     Assistive Device (Bed-Chair Transfers)  walker, front-wheeled  -     Row Name 08/22/19 1205 08/22/19 0755       Sit-Stand Transfer    Sit-Stand [place] (Transfers)  contact guard  -  contact guard;verbal cues  -    Assistive Device (Sit-Stand Transfers)  walker, front-wheeled  -EJ  walker, front-wheeled  -    Row Name 08/22/19 1325 08/22/19 1205       Gait/Stairs Assessment/Training    83458 - Gait Training Minutes   20  -EJ  --    [place] Level (Gait)  --  contact guard  -    Assistive Device (Gait)  --  walker, front-wheeled  -    Distance in Feet (Gait)  --  170  -EJ    Deviations/Abnormal Patterns (Gait)  --  antalgic  -EJ    Bilateral Gait Deviations  --  forward flexed posture;weight shift ability decreased shoulder elevation, holds RWx out in front of body   -EJ    Right Sided Gait Deviations  --  -- step length decreased  -EJ    Number of Steps (Stairs)  --  6  -EJ    Ascending Technique (Stairs)  --  step-to-step  -EJ    Descending Technique (Stairs)  --  step-to-step  -EJ    Comment (Gait/Stairs)  --  Pt cued for upright posture, increased step length RLE. Good improvement with cueing, but needs reminders. On stairs pt performs 3 with L hand rail and R hand on cane. Then performs remaining stairs with HHA +cane (Dtr provided assist during 2/3 stairs).  -EJ    Row Name 08/22/19 1205          Mobility Assessment/Intervention    Left Lower Extremity (Weight-bearing Status)  weight-bearing as tolerated (WBAT)  -EJ       User Key  (r) = Recorded By, (t) = Taken By, (c) = Cosigned By    Initials Name Provider Type     Haley Kim, PT Physical Therapist    Edel Quach, OT Occupational Therapist        Obj/Interventions     Row Name 08/22/19 1316 08/22/19 0755       General ROM    GENERAL ROM COMMENTS  LLE knee lacking 3 degrees extension to 75 degrees flexion  -EJ  BUE WFL  -    Row Name 08/22/19 0755          MMT (Manual Muscle Testing)    General MMT Comments  BUE WFL  -     Row Name 08/22/19 1316          Therapeutic Exercise    Lower Extremity (Therapeutic Exercise)  LAQ (long arc quad), left;SAQ (short arc quad), left;quad sets, left;SLR (straight leg raise), right;heel slides, right  -EJ     Lower Extremity Range of Motion (Therapeutic Exercise)  knee flexion/extension, left;ankle dorsiflexion/plantar flexion, bilateral  -EJ     Position (Therapeutic Exercise)  seated  -EJ     Sets/Reps (Therapeutic Exercise)  1/15  -EJ       User Key  (r) = Recorded By, (t) = Taken By, (c) = Cosigned By    Initials Name Provider Type    Haley Lane, PT Physical Therapist    Edel Quach, OT Occupational Therapist        Goals/Plan    No documentation.       Clinical Impression     Row Name 08/22/19 1320          Pain Assessment    Additional  Documentation  Pain Scale: FACES Pre/Post-Treatment (Group)  -EJ     Row Name 08/22/19 1320 08/22/19 0755       Pain Scale: Numbers Pre/Post-Treatment    Pain Scale: Numbers, Pretreatment  4/10  -EJ  7/10  -LC    Pain Scale: Numbers, Post-Treatment  5/10  -EJ  7/10  -LC    Pain Location - Side  Left  -EJ  --    Pain Location - Orientation  anterior  -EJ  generalized  -LC    Pain Location  knee  -EJ  knee  -LC    Pre/Post Treatment Pain Comment  low thigh, upper knee  -EJ  Nsg notified of pain  -LC    Pain Intervention(s)  Repositioned;Ambulation/increased activity nerve block at 14  -EJ  Cold applied;Repositioned  -LC    Row Name 08/22/19 1322 08/22/19 1200       Plan of Care Review    Plan of Care Reviewed With  patient  -EJ  patient;family  -SC (r) RG (t) SC (c)    Row Name 08/22/19 1000 08/22/19 0800       Plan of Care Review    Plan of Care Reviewed With  patient;family  -SC (r) RG (t) SC (c)  patient;family  -SC (r) RG (t) SC (c)    Row Name 08/22/19 0755 08/22/19 0355       Plan of Care Review    Plan of Care Reviewed With  family;patient;daughter  -LC  patient;family  -    Row Name 08/22/19 1320 08/22/19 0755       Positioning and Restraints    Pre-Treatment Position  sitting in chair/recliner  -EJ  in bed  -LC    Post Treatment Position  chair  -EJ  chair  -LC    In Chair  reclined;call light within reach;encouraged to call for assist;exit alarm on;with family/caregiver  -EJ  sitting;call light within reach;encouraged to call for assist;exit alarm on;with family/caregiver;notified nsg;reclined  -LC      User Key  (r) = Recorded By, (t) = Taken By, (c) = Cosigned By    Initials Name Provider Type    Haley Lane PT Physical Therapist    Piedad Garcia, RN Registered Nurse    Rossana Camacho, RN Registered Nurse    Lori Mccullough, RN Registered Nurse    Edel Quach OT Occupational Therapist        Outcome Measures     Row Name 08/22/19 1324          How much help from another  person do you currently need...    Turning from your back to your side while in flat bed without using bedrails?  4  -EJ     Moving from lying on back to sitting on the side of a flat bed without bedrails?  3  -EJ     Moving to and from a bed to a chair (including a wheelchair)?  3  -EJ     Standing up from a chair using your arms (e.g., wheelchair, bedside chair)?  3  -EJ     Climbing 3-5 steps with a railing?  3  -EJ     To walk in hospital room?  3  -EJ     AM-PAC 6 Clicks Score (PT)  19  -     Row Name 08/22/19 1324 08/22/19 0755       Functional Assessment    Outcome Measure Options  AM-PAC 6 Clicks Basic Mobility (PT)  -  AM-PAC 6 Clicks Daily Activity (OT)  -      User Key  (r) = Recorded By, (t) = Taken By, (c) = Cosigned By    Initials Name Provider Type     Haley Kim PT Physical Therapist    Edel Quach, OT Occupational Therapist        Physical Therapy Education     Title: PT OT SLP Therapies (In Progress)     Topic: Physical Therapy (Done)     Point: Mobility training (Done)     Learning Progress Summary           Patient Acceptance, E, VU,NR by  at 8/22/2019  1:21 PM    Acceptance, E,TB, VU,NR by  at 8/21/2019  3:26 PM                   Point: Home exercise program (Done)     Learning Progress Summary           Patient Acceptance, E, VU,NR by  at 8/22/2019  1:21 PM                   Point: Body mechanics (Done)     Learning Progress Summary           Patient Acceptance, E, VU,NR by  at 8/22/2019  1:21 PM    Acceptance, E,TB, VU,NR by  at 8/21/2019  3:26 PM                   Point: Precautions (Done)     Learning Progress Summary           Patient Acceptance, E, VU,NR by  at 8/22/2019  1:21 PM    Acceptance, E,TB, VU,NR by  at 8/21/2019  3:26 PM                               User Key     Initials Effective Dates Name Provider Type [facility] 11/20/18 -  Haley Kim PT Physical Therapist PT              PT Recommendation and Plan     Outcome  Summary/Treatment Plan (PT)  Anticipated Discharge Disposition (PT): home with assist, home with home health  Plan of Care Reviewed With: patient  Progress: improving  Outcome Summary: Pt ambulates in lopez x 170 ft with CGA, progressing toward SBA, using RWx. Pt goes up/down 6 steps total with HHA,cane. Pt's L knee ROM is lacking 3 degrees from 0, to 75 degrees flexion. PT to continue as able.     Time Calculation:   PT Charges     Row Name 08/22/19 1325             Time Calculation    Start Time  1039  -EJ      PT Received On  08/22/19  -EJ      PT Goal Re-Cert Due Date  08/31/19  -EJ         Time Calculation- PT    Total Timed Code Minutes- PT  36 minute(s)  -EJ         Timed Charges    21477 - PT Therapeutic Exercise Minutes  16  -EJ      92067 - Gait Training Minutes   20  -EJ        User Key  (r) = Recorded By, (t) = Taken By, (c) = Cosigned By    Initials Name Provider Type     Haley Kim, PT Physical Therapist        Therapy Charges for Today     Code Description Service Date Service Provider Modifiers Qty    99375864529 HC PT EVAL MOD COMPLEXITY 4 8/21/2019 Haley Kim, PT GP 1    80106133913 HC PT THER SUPP EA 15 MIN 8/21/2019 Haley Kim, PT GP 2    46558340155 HC PT THER PROC EA 15 MIN 8/22/2019 Haley Kim PT  1    02849268086 HC GAIT TRAINING EA 15 MIN 8/22/2019 Haley Kim, PT  1          PT G-Codes  Outcome Measure Options: AM-PAC 6 Clicks Basic Mobility (PT)  AM-PAC 6 Clicks Score (PT): 19  AM-PAC 6 Clicks Score (OT): 17    Haley Munoz PT  8/22/2019

## 2019-08-22 NOTE — PLAN OF CARE
Problem: Knee Arthroplasty (Total, Partial) (Adult)  Goal: Signs and Symptoms of Listed Potential Problems Will be Absent, Minimized or Managed (Knee Arthroplasty)  Outcome: Ongoing (interventions implemented as appropriate)   08/22/19 0753   Goal/Outcome Evaluation   Problems Assessed (Knee Arthroplasty) functional deficit   Problems Present (Knee Arthroplasty) functional deficit

## 2019-08-22 NOTE — PLAN OF CARE
Problem: Patient Care Overview  Goal: Plan of Care Review  Outcome: Ongoing (interventions implemented as appropriate)   08/22/19 9276   Coping/Psychosocial   Plan of Care Reviewed With patient;family   OTHER   Outcome Summary Pt has rested well around care throughout the night. VSS on 2L nasal cannula, will try to wean this am. Moderate c/o pain controlled with prn meds. Arrow pump unchanged @ 10ml/hr. Ambulating well with assist x1, walker. Ace wrap to L knee CDI, ice packs and SCDs in place. No significant issues noted.    Plan of Care Review   Progress improving       Problem: Knee Arthroplasty (Total, Partial) (Adult)  Goal: Signs and Symptoms of Listed Potential Problems Will be Absent, Minimized or Managed (Knee Arthroplasty)  Outcome: Ongoing (interventions implemented as appropriate)    Goal: Anesthesia/Sedation Recovery  Outcome: Ongoing (interventions implemented as appropriate)

## 2019-08-22 NOTE — PLAN OF CARE
Problem: Patient Care Overview  Goal: Plan of Care Review  Outcome: Ongoing (interventions implemented as appropriate)   08/22/19 0088   Coping/Psychosocial   Plan of Care Reviewed With family;patient;daughter   OTHER   Outcome Summary OT amos completed. Pt. educated on adductor canal nerve catheter management and use of AE for LBD. Pt. refused to trial AE at this time secondary to pain. Educated Pt. on body mechanics and sequence of txfs to improve comfort during transitions. Pt. demo'd supine<>sit w/ SUP, bed<>chair w/ CGA and vc'ing. Recommend home health at discharge.

## 2019-08-22 NOTE — PROGRESS NOTES
Patient is on Apixaban.  Education provided 8/22/19 verbally and in witing.  Discussed effects of medication,  drug-drug and drug-food interactions, and signs/symptoms of bleeding and clotting.  Patient verbalized understanding through teach back.  All pertinent questions were answered.      Thank you,    Hayder GarvinD, TOÑITO  Pharmacy Resident  8/22/2019  1:14 PM

## 2019-08-23 VITALS
DIASTOLIC BLOOD PRESSURE: 62 MMHG | OXYGEN SATURATION: 94 % | SYSTOLIC BLOOD PRESSURE: 125 MMHG | RESPIRATION RATE: 16 BRPM | HEART RATE: 85 BPM | TEMPERATURE: 98.6 F

## 2019-08-23 PROCEDURE — A9270 NON-COVERED ITEM OR SERVICE: HCPCS | Performed by: ORTHOPAEDIC SURGERY

## 2019-08-23 PROCEDURE — 97116 GAIT TRAINING THERAPY: CPT

## 2019-08-23 PROCEDURE — 97110 THERAPEUTIC EXERCISES: CPT

## 2019-08-23 PROCEDURE — 99024 POSTOP FOLLOW-UP VISIT: CPT | Performed by: ORTHOPAEDIC SURGERY

## 2019-08-23 PROCEDURE — A9270 NON-COVERED ITEM OR SERVICE: HCPCS | Performed by: NURSE PRACTITIONER

## 2019-08-23 PROCEDURE — 63710000001 LEVOTHYROXINE 75 MCG TABLET: Performed by: NURSE PRACTITIONER

## 2019-08-23 PROCEDURE — 63710000001 ONDANSETRON PER 8 MG: Performed by: ORTHOPAEDIC SURGERY

## 2019-08-23 PROCEDURE — 63710000001 OXYCODONE-ACETAMINOPHEN 5-325 MG TABLET: Performed by: ORTHOPAEDIC SURGERY

## 2019-08-23 PROCEDURE — 63710000001 PANTOPRAZOLE 40 MG TABLET DELAYED-RELEASE: Performed by: NURSE PRACTITIONER

## 2019-08-23 PROCEDURE — 63710000001 APIXABAN 2.5 MG TABLET: Performed by: ORTHOPAEDIC SURGERY

## 2019-08-23 PROCEDURE — 63710000001 PAROXETINE 20 MG TABLET: Performed by: NURSE PRACTITIONER

## 2019-08-23 PROCEDURE — 63710000001 DOCUSATE SODIUM 100 MG CAPSULE: Performed by: ORTHOPAEDIC SURGERY

## 2019-08-23 PROCEDURE — 63710000001 MAGNESIUM HYDROXIDE 2400 MG/10ML SUSPENSION: Performed by: ORTHOPAEDIC SURGERY

## 2019-08-23 RX ORDER — ASPIRIN 325 MG
325 TABLET, DELAYED RELEASE (ENTERIC COATED) ORAL DAILY
Qty: 30 TABLET | Refills: 0 | Status: SHIPPED | OUTPATIENT
Start: 2019-08-23 | End: 2020-08-11

## 2019-08-23 RX ORDER — DOCUSATE SODIUM 100 MG/1
100 CAPSULE, LIQUID FILLED ORAL 2 TIMES DAILY PRN
Qty: 60 CAPSULE | Refills: 0 | Status: SHIPPED | OUTPATIENT
Start: 2019-08-23 | End: 2020-08-11

## 2019-08-23 RX ORDER — ASPIRIN 81 MG/1
81 TABLET, CHEWABLE ORAL DAILY
Start: 2019-08-23

## 2019-08-23 RX ADMIN — LEVOTHYROXINE SODIUM 75 MCG: 75 TABLET ORAL at 06:01

## 2019-08-23 RX ADMIN — SODIUM CHLORIDE, PRESERVATIVE FREE 3 ML: 5 INJECTION INTRAVENOUS at 10:14

## 2019-08-23 RX ADMIN — MAGNESIUM HYDROXIDE 10 ML: 2400 SUSPENSION ORAL at 09:10

## 2019-08-23 RX ADMIN — OXYCODONE HYDROCHLORIDE AND ACETAMINOPHEN 1 TABLET: 5; 325 TABLET ORAL at 14:25

## 2019-08-23 RX ADMIN — APIXABAN 2.5 MG: 2.5 TABLET, FILM COATED ORAL at 09:10

## 2019-08-23 RX ADMIN — OXYCODONE HYDROCHLORIDE AND ACETAMINOPHEN 1 TABLET: 5; 325 TABLET ORAL at 10:14

## 2019-08-23 RX ADMIN — DOCUSATE SODIUM 100 MG: 100 CAPSULE, LIQUID FILLED ORAL at 09:10

## 2019-08-23 RX ADMIN — ONDANSETRON HYDROCHLORIDE 4 MG: 4 TABLET, FILM COATED ORAL at 13:41

## 2019-08-23 RX ADMIN — PAROXETINE HYDROCHLORIDE 20 MG: 20 TABLET, FILM COATED ORAL at 06:01

## 2019-08-23 RX ADMIN — OXYCODONE HYDROCHLORIDE AND ACETAMINOPHEN 1 TABLET: 5; 325 TABLET ORAL at 06:01

## 2019-08-23 RX ADMIN — PANTOPRAZOLE SODIUM 40 MG: 40 TABLET, DELAYED RELEASE ORAL at 06:01

## 2019-08-23 RX ADMIN — OXYCODONE HYDROCHLORIDE AND ACETAMINOPHEN 1 TABLET: 5; 325 TABLET ORAL at 01:57

## 2019-08-23 NOTE — PROGRESS NOTES
Orthopaedic Surgery Progress Note      LOS: 0 days   Patient Care Team:  Heri Centeno MD as PCP - General (Pediatrics)    POD 2    Subjective     Interval History:   Patient better this morning that she was yesterday.  Pain is under better control.  She denies chest pain or shortness of breath    Objective     Vital Signs:  Temp (24hrs), Av.7 °F (37.1 °C), Min:98.4 °F (36.9 °C), Max:98.9 °F (37.2 °C)    /78 (BP Location: Right arm, Patient Position: Lying)   Pulse 92   Temp 98.9 °F (37.2 °C) (Oral)   Resp 16   SpO2 93%   Breastfeeding? No     Labs:  Lab Results (last 24 hours)     ** No results found for the last 24 hours. **          Physical Exam:  EHL, FHL, gastroc soleus, and tibialis anterior are intact  Toes are pink and warm  Surgical dressing is in place  Palpable dorsalis pedis pulse  Calf is soft and non tender    Assessment/Plan     Postoperative day number 2 status post left total knee arthroplasty    Pain Control: Improved    PT and OT     DVT prophylaxis: Eliquis x2 weeks followed by full dose aspirin for 1 month.    Discharge planning: Anticipate discharge home today.  [facility] [place]  Anticipate that she will need the rapid transition to outpatient PT program if available.  We will ask  to help coordinate.    Upon discharge, patient will need follow-up with me in 3 weeks' time.  They will need [facility] for physical therapy.  Standard Prineo dressing care instructions.  Do not remove.  DME per case management.    Admission Status:  I believe this patient meets INPATIENT status due to the need for care which can only be reasonably provided in an hospital setting such as aggressive/expedited ancillary services and/or consultation services, the necessity for IV medications, close physician monitoring and/or the possible need for procedures.  In such, I feel patient’s risk for adverse outcomes and need for  care warrant INPATIENT evaluation and predict the patient’s care encounter to likely last beyond 2 midnights.        Tristan Gonzalez MD  08/23/19  7:22 AM

## 2019-08-23 NOTE — PLAN OF CARE
Problem: Patient Care Overview  Goal: Plan of Care Review  Outcome: Ongoing (interventions implemented as appropriate)   08/23/19 7308   Coping/Psychosocial   Plan of Care Reviewed With patient;family   OTHER   Outcome Summary Pt has rested throughout night. VSS, added 2L nasal cannula to sleep. Ambulating well with 1 assist, rolling walker. Voiding spontaneously. Awakened for pain medication per pt request. NC remains @ 10ml/hr, unchanged as pt states little relief. Reports pain level better this am, rating 5/10. Anticipate D/C home today.    Plan of Care Review   Progress improving       Problem: Knee Arthroplasty (Total, Partial) (Adult)  Goal: Signs and Symptoms of Listed Potential Problems Will be Absent, Minimized or Managed (Knee Arthroplasty)  Outcome: Ongoing (interventions implemented as appropriate)

## 2019-08-23 NOTE — THERAPY TREATMENT NOTE
Patient Name: Eli Clayton  : 1940    MRN: [id number]                              Today's Date: 2019       Admit Date: 2019    Visit Dx:     ICD-10-CM ICD-9-CM   1. Impaired functional mobility, balance, gait, and endurance Z74.09 V49.89   2. Primary osteoarthritis of left knee M17.12 715.16   3. Impaired mobility and ADLs Z74.09 799.89     Patient Active Problem List   Diagnosis   • Primary osteoarthritis of left knee   • Status post total left knee replacement   • Hypothyroid   • Leukocytosis, mild, likely reactive   • Acute blood loss anemia, mild, likely reactive   • Acute postoperative pain     Past Medical History:   Diagnosis Date   • Anesthesia complication     decreased oxygen and heart rate after knee surgery    • Arthritis     osteoarthritis   • Cancer (CMS/HCC)     cervical and skin   • Disease of thyroid gland    • GERD (gastroesophageal reflux disease)    • Hard of hearing     has hearing aid but does not wear it    • History of transfusion    • Interstitial cystitis    • PONV (postoperative nausea and vomiting)    • Wears dentures    • Wears glasses      Past Surgical History:   Procedure Laterality Date   • COLONOSCOPY     • EYE SURGERY      cataracts removed    • HYSTERECTOMY     • KNEE SURGERY Right 2009    replacement    • SKIN BIOPSY     • TOTAL KNEE ARTHROPLASTY Left 2019    Procedure: TOTAL KNEE ARTHROPLASTY LEFT;  Surgeon: Tristan Gonzalez MD;  Location: [facility];  Service: Orthopedics     General Information     Row Name 19 1030          PT Evaluation Time/Intention    Document Type  therapy note (daily note)  -EJ     Mode of Treatment  physical therapy  -EJ     Row Name 19 1030          General Information    Patient Profile Reviewed?  yes  -EJ     Row Name 19 1030          Safety Issues, Functional Mobility    Impairments Affecting Function (Mobility)  pain;strength;range of motion (ROM)  -EJ       User Key  (r) = Recorded By, (t) = Taken  By, (c) = Cosigned By    Initials Name Provider Type    Haley Lane PT Physical Therapist        Mobility     Row Name 08/23/19 1030          Bed Mobility Assessment/Treatment    Comment (Bed Mobility)  UIC  -     Row Name 08/23/19 1030          Sit-Stand Transfer    Sit-Stand [place] (Transfers)  supervision  -     Assistive Device (Sit-Stand Transfers)  walker, front-wheeled  -     Row Name 08/23/19 1042 08/23/19 1030       Gait/Stairs Assessment/Training    97390 - Gait Training Minutes   22  -EJ  --    [place] Level (Gait)  --  stand by assist  -    Assistive Device (Gait)  --  walker, front-wheeled  -    Distance in Feet (Gait)  --  340  -EJ    Deviations/Abnormal Patterns (Gait)  --  right sided deviations;stride length decreased  -    Left Sided Gait Deviations  --  heel strike decreased;weight shift ability decreased  -    Comment (Gait/Stairs)  --  Pt externally rotates LLE. Pt cued for toes forward, increased heelstrike and relaxing shoulders. Cued for increased RLE step length.  -    Row Name 08/23/19 1030          Mobility Assessment/Intervention    Left Lower Extremity (Weight-bearing Status)  weight-bearing as tolerated (WBAT)  -       User Key  (r) = Recorded By, (t) = Taken By, (c) = Cosigned By    Initials Name Provider Type    Haley Lane PT Physical Therapist        Obj/Interventions     Row Name 08/23/19 1036          General ROM    GENERAL ROM COMMENTS  LLE knee lacking 2 degrees from 0 to 74 degrees of flexion  -[facility] Name 08/23/19 1036          Therapeutic Exercise    Lower Extremity (Therapeutic Exercise)  SLR (straight leg raise), left;SAQ (short arc quad), left;quad sets, left;LAQ (long arc quad), left;heel slides, left  -EJ     Lower Extremity Range of Motion (Therapeutic Exercise)  ankle dorsiflexion/plantar flexion, bilateral;knee flexion/extension, left  -     Exercise Type (Therapeutic Exercise)  AROM (active range of motion)   -EJ       User Key  (r) = Recorded By, (t) = Taken By, (c) = Cosigned By    Initials Name Provider Type    EJ Haley Kim PT Physical Therapist        Goals/Plan    No documentation.       Clinical Impression     Row Name 08/23/19 1037          Pain Assessment    Additional Documentation  Pain Scale: FACES Pre/Post-Treatment (Group)  -EJ     [facility] Name 08/23/19 1037          Pain Scale: Numbers Pre/Post-Treatment    Pain Location - Orientation  anterior  -EJ     Pre/Post Treatment Pain Comment  thigh, sides of knee  -EJ     Pain Intervention(s)  Ambulation/increased activity;Repositioned  -EJ     Row Name 08/23/19 1037          Pain Scale: FACES Pre/Post-Treatment    Pain: FACES Scale, Pretreatment  4-->hurts little more  -EJ     Pain: FACES Scale, Post-Treatment  4-->hurts little more  -EJ     Row Name 08/23/19 1039 08/23/19 1037       Plan of Care Review    Plan of Care Reviewed With  patient  -EJ  patient;daughter  -    Row Name 08/23/19 0910 08/23/19 0346       Plan of Care Review    Plan of Care Reviewed With  patient;family  -NB  patient;family  -      User Key  (r) = Recorded By, (t) = Taken By, (c) = Cosigned By    Initials Name Provider Type     Haley Kim, PT Physical Therapist    Lori Mccullough, RN Registered Nurse    Eli Redmond RN Registered Nurse        Outcome Measures     Row Name 08/23/19 1041          How much help from another person do you currently need...    Turning from your back to your side while in flat bed without using bedrails?  4  -EJ     Moving from lying on back to sitting on the side of a flat bed without bedrails?  3  -EJ     Moving to and from a bed to a chair (including a wheelchair)?  3  -EJ     Standing up from a chair using your arms (e.g., wheelchair, bedside chair)?  4  -EJ     Climbing 3-5 steps with a railing?  3  -EJ     To walk in hospital room?  3  -EJ     AM-PAC 6 Clicks Score (PT)  20  -EJ     Row Name 08/23/19 1041          Functional  Assessment    Outcome Measure Options  AM-PAC 6 Clicks Basic Mobility (PT)  -       User Key  (r) = Recorded By, (t) = Taken By, (c) = Cosigned By    Initials Name Provider Type    Haley Lane PT Physical Therapist        Physical Therapy Education     Title: PT OT SLP Therapies (In Progress)     Topic: Physical Therapy (Done)     Point: Mobility training (Done)     Learning Progress Summary           Patient Acceptance, E,TB,H,D, VU,DU by KENISHA at 8/23/2019 10:38 AM    Acceptance, E, VU,DU by KENISHA at 8/22/2019  4:34 PM    Acceptance, E, VU,NR by KENISHA at 8/22/2019  1:21 PM    Acceptance, E,TB, VU,NR by KENISHA at 8/21/2019  3:26 PM                   Point: Home exercise program (Done)     Learning Progress Summary           Patient Acceptance, E,TB,H,D, VU,DU by KENISHA at 8/23/2019 10:38 AM    Acceptance, E, VU,DU by KENISHA at 8/22/2019  4:34 PM    Acceptance, E, VU,NR by KENISHA at 8/22/2019  1:21 PM                   Point: Body mechanics (Done)     Learning Progress Summary           Patient Acceptance, E,TB,H,D, VU,DU by KENISHA at 8/23/2019 10:38 AM    Acceptance, E, VU,DU by KENISHA at 8/22/2019  4:34 PM    Acceptance, E, VU,NR by KENISHA at 8/22/2019  1:21 PM    Acceptance, E,TB, VU,NR by KENISHA at 8/21/2019  3:26 PM                   Point: Precautions (Done)     Learning Progress Summary           Patient Acceptance, E,TB,H,D, VU,DU by KENISHA at 8/23/2019 10:38 AM    Acceptance, E, VU,DU by KENISHA at 8/22/2019  4:34 PM    Acceptance, E, VU,NR by KENISHA at 8/22/2019  1:21 PM    Acceptance, E,TB, VU,NR by KENISHA at 8/21/2019  3:26 PM                               User Key     Initials Effective Dates Name Provider Type [facility] 11/20/18 -  Haley Kim PT Physical Therapist PT              PT Recommendation and Plan     Outcome Summary/Treatment Plan (PT)  Anticipated Discharge Disposition (PT): home with assist, home with home health  Plan of Care Reviewed With: patient  Progress: improving  Outcome Summary: Pt ambulates 340 ft in lopez  with RWx, SBA. Pt LLE knee extension is lacking 2 degrees from 0 to 74 degrees of flexion. Pt anticipating d/c home with HHPT.      Time Calculation:   PT Charges     Row Name 08/23/19 1042             Time Calculation    Start Time  0846  -EJ      PT Received On  08/23/19  -EJ         Time Calculation- PT    Total Timed Code Minutes- PT  42 minute(s)  -EJ         Timed Charges    56115 - PT Therapeutic Exercise Minutes  20  -EJ      16175 - Gait Training Minutes   22  -EJ        User Key  (r) = Recorded By, (t) = Taken By, (c) = Cosigned By    Initials Name Provider Type    EJ Haley Kim, PT Physical Therapist        Therapy Charges for Today     Code Description Service Date Service Provider Modifiers Qty    01014171359 HC PT THER PROC EA 15 MIN 8/22/2019 Haley Kim, PT GP 1    22753810198 HC GAIT TRAINING EA 15 MIN 8/22/2019 Haley Kim, PT GP 1    93089296032 HC PT THER PROC EA 15 MIN 8/22/2019 Haley Kim, PT GP 1    91693544846 HC GAIT TRAINING EA 15 MIN 8/22/2019 Haley Kim, PT GP 1    39541173568 HC PT THER PROC EA 15 MIN 8/23/2019 Haley Kim, PT GP 1    51036184939 HC GAIT TRAINING EA 15 MIN 8/23/2019 Haley Kim, PT GP 2          PT G-Codes  Outcome Measure Options: AM-PAC 6 Clicks Basic Mobility (PT)  AM-PAC 6 Clicks Score (PT): 20  AM-PAC 6 Clicks Score (OT): 17    Haley Munoz, PT  8/23/2019

## 2019-08-23 NOTE — PLAN OF CARE
Problem: Fall Risk (Adult)  Goal: Identify Related Risk Factors and Signs and Symptoms  Outcome: Outcome(s) achieved Date Met: 08/23/19    Goal: Absence of Fall  Outcome: Outcome(s) achieved Date Met: 08/23/19    Goal: Identify Related Risk Factors and Signs and Symptoms  Outcome: Outcome(s) achieved Date Met: 08/23/19    Goal: Absence of Fall  Outcome: Outcome(s) achieved Date Met: 08/23/19

## 2019-08-23 NOTE — PLAN OF CARE
Problem: Patient Care Overview  Goal: Plan of Care Review  Outcome: Ongoing (interventions implemented as appropriate)   08/23/19 1039   Coping/Psychosocial   Plan of Care Reviewed With patient   OTHER   Outcome Summary Pt ambulates 340 ft in lopez with RWx, SBA. Pt LLE knee extension is lacking 2 degrees from 0 to 74 degrees of flexion. Pt anticipating d/c home with HHPT.    Plan of Care Review   Progress improving

## 2019-08-23 NOTE — DISCHARGE SUMMARY
Patient Name: Eli Clayton  MRN: [id number]  : 1940  DOS: 2019    Attending: Tristan Gonzalez,*    Primary Care Provider: Heri Centeno MD    Date of Admission:.2019  5:38 AM    Date of Discharge:  2019    Discharge Diagnosis:       Status post total left knee replacement    Primary osteoarthritis of left knee    Hypothyroid    Leukocytosis, mild, likely reactive    Acute blood loss anemia, mild, likely reactive    Acute postoperative pain      Hospital Course    Patient is a 79 y.o. female presented for left total knee arthroplasty by Dr. Gonzalez.     She underwent surgery under spinal anesthesia. She tolerated surgery well and is admitted for further medical management. Her knee has been painful for about 2 years. She denies use of assistive device for ambulation. She has had recent falls.    Patient was provided pain medications as needed for pain control, along with adductor canal nerve block infusion of Ropivacaine.      Adjustments were made to pain medications to optimize postop pain management. Risks and benefits of opiate medications discussed with patient.    She was seen by PT and OT and has progressed well over her stay.    She used an IS for atelectasis prophylaxis and aspirin along with mechanicals for DVT prophylaxis.  Home medications were resumed as appropriate, and labs were monitored and remained fairly stable.     With the progress she has made, she is ready for DC home today.    She will have an Arrow pump ( instructed on it during this admit).    Discussed with patient regarding plan and she shows understanding and agreement.    Patient will have HHPT following discharge.        Procedures Performed  PREOPERATIVE DIAGNOSIS: Left knee primary osteoarthritis      POSTOPERATIVE DIAGNOSIS: Same     PROCEDURE PERFORMED: Left total knee arthroplasty, CPT 72282     SURGEON: Tristan Gonzalez MD    Pertinent Test Results:    I reviewed the patient's new  clinical results.   Results from last 7 days   Lab Units 19  0533   WBC 10*3/mm3 13.30*   HEMOGLOBIN g/dL 10.8*   HEMATOCRIT % 34.5   PLATELETS 10*3/mm3 292     Results for ALIS FRANK (MRN [id number]) as of 2019 13:36   Ref. Range 2019 11:33   Hemoglobin Latest Ref Range: 12.0 - 15.9 g/dL 13.2   Hematocrit Latest Ref Range: 34.0 - 46.6 % 41.4     Results from last 7 days   Lab Units 19  0533   SODIUM mmol/L 138   POTASSIUM mmol/L 4.3   CHLORIDE mmol/L 108*   CO2 mmol/L 23.0   BUN mg/dL 9   CREATININE mg/dL 0.79   CALCIUM mg/dL 8.3*   GLUCOSE mg/dL 134*     I reviewed the patient's new imaging including images and reports.      Physical therapy: Pt ambulates 340 ft in [place]opez with RWx, SBA. Pt LLE knee extension is lacking 2 degrees from 0 to 74 degrees of flexion. Pt anticipating d/c home with HHPT.     Discharge Assessment:    Vital Signs  Visit Vitals  /62 (BP Location: Right arm, Patient Position: Lying)   Pulse 85   Temp 98.6 °F (37 °C) (Oral)   Resp 16   SpO2 93%   Breastfeeding? No     Temp (24hrs), Av.5 °F (36.9 °C), Min:98.1 °F (36.7 °C), Max:98.9 °F (37.2 °C)      General Appearance:    Alert, cooperative, in no acute distress   Lungs:     Clear to auscultation,respirations regular, even and                   unlabored    Heart:    Regular rhythm and normal rate, normal S1 and S2   Abdomen:     Normal bowel sounds, no masses, no organomegaly, soft        non-tender, non-distended, no guarding, no rebound                 tenderness   Extremities:   Moves all extremities well, no edema, no cyanosis, no              Redness. Left knee ACE wrap CDI. Nerve block present   Pulses:   Pulses palpable and equal bilaterally   Skin:   No bleeding, bruising or rash   Neurologic:   Cranial nerves 2 - 12 grossly intact, sensation intact. Flexion and dorsiflexion intact bilateral feet.       Discharge Disposition: Home    Discharge Medications     Discharge Medications      New Medications       Instructions Start Date   apixaban 2.5 MG tablet tablet  Commonly known as:  ELIQUIS   2.5 mg, Oral, Every 12 Hours Scheduled, For 2 weeks      docusate sodium 100 MG capsule   100 mg, Oral, 2 Times Daily PRN      oxyCODONE-acetaminophen 5-325 MG per tablet  Commonly known as:  PERCOCET   1 tablet, Oral, Every 4 Hours PRN         Changes to Medications      Instructions Start Date   aspirin 81 MG chewable tablet  What changed:  additional instructions   81 mg, Oral, Daily, Resume in 6 weeks      aspirin  MG tablet  What changed:  You were already taking a medication with the same name, and this prescription was added. Make sure you understand how and when to take each.   325 mg, Oral, Daily, Begin after Eliquis course complete, for 1 month         Continue These Medications      Instructions Start Date   fexofenadine 180 MG tablet  Commonly known as:  ALLEGRA   180 mg, Oral, Daily      influenza vac split high-dose 0.5 ML suspension prefilled syringe injection  Commonly known as:  FLUZONE HIGH DOSE   Fluzone High-Dose 2011-12 (PF) 180 mcg/0.5 mL intramuscular syringe   1 (one) suspension intramuscular once a day      levothyroxine 75 MCG tablet  Commonly known as:  SYNTHROID, LEVOTHROID   75 mcg, Oral, Daily      omeprazole 20 MG capsule  Commonly known as:  priLOSEC   20 mg, Oral, Daily      PARoxetine 20 MG tablet  Commonly known as:  PAXIL   20 mg, Oral, Every Morning             Discharge Diet: Regular diet    Activity at Discharge: WBAT LLE    Follow-up Appointments  Dr. Gonzalez per his orders    Discharge took over 30 min.    ISMA Mckeon  08/23/19  12:57 PM     I have personally performed the evaluation on this patient. My history is consistant  with above documentation . My exam finding are listed above. I have personally reviewed and discussed the above formulated discharge plan with patient and APRN.wy.

## 2019-08-23 NOTE — NURSING NOTE
Acute Pain Service:  Peripheral nerve catheter and disposable infusion device teaching completed with patient and family member. Discussion, handout and bracelet provided with [facility] on call central phone number.  Instructed to call with any questions or concerns.  Patient verbalized understanding.  Service will continue to follow until catheter DC'd.  Please contact patient at [phone] if needed.

## 2019-08-23 NOTE — PROGRESS NOTES
Damon    Acute pain service Inpatient Progress Note    Patient Name: Eli Clayton  :  1940  MRN:  [id number]        Acute Pain  Service Inpatient Progress Note:    Analgesia:Fair  LOC: alert and awake  Resp Status: room air  Cardiac: VS stable  Side Effects:None  Catheter Site:clean, dressing intact and dry  Cath type: peripheral nerve cath with ON Q  Infusion rate: 10ml/hr  Catheter Plan:Catheter to remain Insitu, Continue catheter infusion rate unchanged and Patient to be discharged home

## 2019-08-23 NOTE — PLAN OF CARE
Problem: Patient Care Overview  Goal: Individualization and Mutuality  Outcome: Outcome(s) achieved Date Met: 08/23/19    Goal: Discharge Needs Assessment  Outcome: Outcome(s) achieved Date Met: 08/23/19    Goal: Interprofessional Rounds/Family Conf  Outcome: Outcome(s) achieved Date Met: 08/23/19

## 2019-08-23 NOTE — PLAN OF CARE
Problem: Knee Arthroplasty (Total, Partial) (Adult)  Goal: Anesthesia/Sedation Recovery  Outcome: Outcome(s) achieved Date Met: 08/23/19

## 2019-08-24 NOTE — PROGRESS NOTES
EDEL Kunz    Nerve Cath Post Op Call    Patient Name: Eli Clayton  :  1940  MRN:  [id number]  Date of Discharge: 2019    Nerve Cath Post Op Call:    Analgesia:Good  Catheter Plan:Patient/Family member report nerve catheter previously discontinued, tip intact

## 2019-08-26 ENCOUNTER — NURSE TRIAGE (OUTPATIENT)
Dept: CALL CENTER | Facility: HOSPITAL | Age: 79
End: 2019-08-26

## 2019-08-26 NOTE — THERAPY DISCHARGE NOTE
Acute Care - Occupational Therapy Discharge Summary  [facility]     Patient Name: Eli Clayton  : 1940  MRN: [id number]    Today's Date: 2019  Onset of Illness/Injury or Date of Surgery: 19    Date of Referral to OT: 19  Referring Physician: MD Lisa      Admit Date: 2019        OT Recommendation and Plan    Visit Dx:    ICD-10-CM ICD-9-CM   1. Impaired functional mobility, balance, gait, and endurance Z74.09 V49.89   2. Primary osteoarthritis of left knee M17.12 715.16   3. Impaired mobility and ADLs Z74.09 799.89               Rehab Goal Summary     Row Name 19 1425             Transfer Goal 1 (OT)    Progress/Outcome (Transfer Goal 1, OT)  goal not met;discharged from facility  -EMMANUEL         Dressing Goal 1 (OT)    Progress/Outcome (Dressing Goal 1, OT)  goal not met;discharged from facility  -EMMANUEL         Toileting Goal 1 (OT)    Progress/Outcome (Toileting Goal 1, OT)  goal not met;discharged from facility  -EMMANUEL        User Key  (r) = Recorded By, (t) = Taken By, (c) = Cosigned By    Initials Name Provider Type Discipline    Cande Brooks OT Occupational Therapist OT              Therapy Suggested Charges     Code   Minutes Charges    68582 (CPT®) Hc Ot Neuromusc Re Education Ea 15 Min      26480 (CPT®) Hc Ot Ther Proc Ea 15 Min      98530 (CPT®) Hc Ot Therapeutic Act Ea 15 Min      70108 (CPT®) Hc Ot Manual Therapy Ea 15 Min      64870 (CPT®) Hc Ot Iontophoresis Ea 15 Min      40755 (CPT®) Hc Ot Elec Stim Ea-Per 15 Min      81686 (CPT®) Hc Ot Ultrasound Ea 15 Min      57709 (CPT®) Hc Ot Self Care/Mgmt/Train Ea 15 Min 26 2    Total  26 2              OT Discharge Summary  Reason for Discharge: Discharge from facility  Outcomes Achieved: Refer to plan of care for updates on goals achieved  Discharge Destination: Home with home health      Cande Jones OT  2019

## 2019-08-26 NOTE — PROGRESS NOTES
Continued Stay Note  [facility]     Patient Name: Eli Clayton  MRN: [id number]  Today's Date: 8/26/2019    Admit Date: 8/21/2019    Discharge Plan     Row Name 08/26/19 1659       Plan    Plan Comments  CM received call from [facility] and pt's daughter has contacted them as pt's home health has not seen her. CM attempted to call [facility] and no answer, previous orders appear to have been faxed. CM refaxed orders to [facility]. CM spoke with pt's nathanseverino Freitas and updated orders have been faxed again and CM attempted to contact the office and  was already closed. Zena reported when she spoke with  office they informed her they would not answer the phones after 4:30pm. CM provided main  office phone number to contact us if  did not reach out to her tomorrow to arrange appointment.         Discharge Codes    No documentation.       Expected Discharge Date and Time     Expected Discharge Date Expected Discharge Time    Aug 23, 2019             Shiloh Encinas RN

## 2019-08-26 NOTE — TELEPHONE ENCOUNTER
Caller states that her mother had joint replacement surgery on 8/21and was discharged 8/23.  She was told she would hear from [facility]  in 1 to 2 days and she still had not heard by this am.  She has called  several times and they do not have an order.   stated to her that they had tried to contact case management at [facility] for orders and have had no call back, they told her to call back this afternoon and hopefully they would have orders.  Caller states that she called them at 4 pm and they still had not been able to reach case management for orders.      Epic records reviewed and it does appear that orders and patient information had been sent to [facility] .  [facility] contacted and they have not received anything but patient demographics.  Case management contacted and message left.  Called switchboard again and asked if there was a  available to speak with, connected to ER .  Explained to ER  the situation and she also reviewed the Epic records.  She will contact [facility] to make sure they have the correct fax number and refax the order.  Patient's daughter notified that case management is re faxing the orders and information.    Lengthy triage call.    Reason for Disposition  • Health Information question, no triage required and triager able to answer question    Additional Information  • Negative: [1] Caller is not with the adult (patient) AND [2] reporting urgent symptoms  • Negative: Lab result questions  • Negative: Medication questions  • Negative: Caller cannot be reached by phone  • Negative: Caller has already spoken to PCP or another triager  • Negative: RN needs further essential information from caller in order to complete triage  • Negative: Requesting regular office appointment  • Negative: [1] Caller requesting NON-URGENT health information AND [2] PCP's office is the best resource    Answer Assessment - Initial Assessment Questions  1. REASON FOR  "CALL or QUESTION: \"What is your reason for calling today?\" or \"How can I best help you?\" or \"What question do you have that I can help answer?\"      My mother had surgery and is supposed to have home health and they do not have an order.    Protocols used: INFORMATION ONLY CALL-ADULT-      "

## 2019-08-27 NOTE — PROGRESS NOTES
Continued Stay Note  [facility]     Patient Name: Eli Clayton  MRN: [id number]  Today's Date: 8/27/2019    Admit Date: 8/21/2019    Discharge Plan     Row Name 08/27/19 0850       Plan    Plan  Post-discharge note    Plan Comments  Called [facility] and confirmed that they received yesterday's fax. They are starting services today, 8/27         Discharge Codes    No documentation.       Expected Discharge Date and Time     Expected Discharge Date Expected Discharge Time    Aug 23, 2019             Edel Squires RN

## 2019-08-27 NOTE — PROGRESS NOTES
Continued Stay Note  [facility]     Patient Name: Eli Clayton  MRN: [id number]  Today's Date: 8/27/2019    Admit Date: 8/21/2019    Discharge Plan     Row Name 08/27/19 1439       Plan    Plan Comments  Called [facility] again this afternoon to make sure that they saw the patient. They now say that they will not be able to see the patient until 8/28.         Edel Squires RN

## 2019-08-27 NOTE — PROGRESS NOTES
Continued Stay Note  [facility]     Patient Name: Eli Clayton  MRN: [id number]  Today's Date: 8/27/2019    Admit Date: 8/21/2019    Discharge Plan     Row Name 08/27/19 4704       Plan    Plan  Post dc note    Patient/Family in Agreement with Plan  yes    Plan Comments  Spoke with patient and her dtr via speakerphone regarding HH options. [facility]  will not be able to provide therapy until, hopefully, tomorrow- 8/27. They were originally ordered to provide therapy last week but as of today had not been out to see patient- stating failure to receive fax. Patient's dc date was provided to [facility] on day of dc and contact info for CM was also provided in the event the fax was not received or additional info was needed, although fax showed successful on [facility]'s end. [facility] covers [facility], CM spoke w/ [facility] and were going to arrange for a therapist to see patient today, and then continue to provide therapy as long as needed, if patient agreeable. Patient's dtr was agreeable to changing HH agencies however patient states she wants to stay w/ [facility] . She states that if [facility] doesn't come out to see her tomorrow she would be agreeable to [facility] w/ [facility] and following. As of now plan remains for [facility] to provide HH/PT per patient request.     Row Name 08/27/19 8076       Plan    Plan Comments  Called  [facility] again this afternoon to make sure that they saw the patient. They now say that they will not be able to see the patient until 8/28.         Discharge Codes    No documentation.       Expected Discharge Date and Time     Expected Discharge Date Expected Discharge Time    Aug 23, 2019             Meron Bella RN

## 2019-08-29 ENCOUNTER — TELEPHONE (OUTPATIENT)
Dept: ORTHOPEDIC SURGERY | Facility: CLINIC | Age: 79
End: 2019-08-29

## 2019-08-29 DIAGNOSIS — Z96.652 STATUS POST TOTAL LEFT KNEE REPLACEMENT: Primary | ICD-10-CM

## 2019-08-29 RX ORDER — OXYCODONE HYDROCHLORIDE AND ACETAMINOPHEN 5; 325 MG/1; MG/1
1 TABLET ORAL EVERY 6 HOURS PRN
Qty: 40 TABLET | Refills: 0 | Status: SHIPPED | OUTPATIENT
Start: 2019-08-29 | End: 2019-09-07

## 2019-08-29 NOTE — TELEPHONE ENCOUNTER
PATIENT IS POST OP. PATIENT DAUGHTER IS REQUESTING A REFILL ON PERCOCET. PATIENT HAS 10 LEFT AND DOES NOT WANT TO GO INTO THE WEEKEND WITHOUT A REFILL.

## 2019-09-10 ENCOUNTER — OFFICE VISIT (OUTPATIENT)
Dept: ORTHOPEDIC SURGERY | Facility: CLINIC | Age: 79
End: 2019-09-10

## 2019-09-10 DIAGNOSIS — Z96.652 STATUS POST TOTAL LEFT KNEE REPLACEMENT: Primary | ICD-10-CM

## 2019-09-10 PROCEDURE — 99024 POSTOP FOLLOW-UP VISIT: CPT | Performed by: ORTHOPAEDIC SURGERY

## 2019-09-10 RX ORDER — OXYCODONE HYDROCHLORIDE AND ACETAMINOPHEN 5; 325 MG/1; MG/1
1 TABLET ORAL EVERY 8 HOURS PRN
Qty: 30 TABLET | Refills: 0 | Status: SHIPPED | OUTPATIENT
Start: 2019-09-10 | End: 2020-08-11

## 2019-09-10 RX ORDER — ONDANSETRON 4 MG/1
4 TABLET, FILM COATED ORAL EVERY 8 HOURS PRN
Qty: 10 TABLET | Refills: 1 | Status: SHIPPED | OUTPATIENT
Start: 2019-09-10 | End: 2021-02-18

## 2019-09-10 RX ORDER — DIPHENHYDRAMINE HCL 25 MG
25 TABLET ORAL EVERY 6 HOURS PRN
COMMUNITY

## 2019-09-10 NOTE — PROGRESS NOTES
[facility] [place] [facility] [place]    Subjective     Post-op (3 weeks status post total knee arthroplasty, left 08/21/19)       HPI    Eli Clayton is a 79 y.o. female.  Patient is 3 weeks out from left total knee arthroplasty on 8/21/2019.  She is doing very well.  She is with her daughter today.  She is using Percocet for pain control.  Having a little bit of itching and they have cut back on her dosing.  She is completed home health and is ready to do outpatient physical therapy at [place].        Objective      Physical Exam  There were no vitals taken for this visit.    There is no height or weight on file to calculate BMI.        Ortho Exam  Peripheral Vascular:    Upper Extremity:   Inspection:  Left--no cyanotic nail beds Right--no cyanotic nail beds   Bilateral:  Pink nail beds with brisk capillary refill   Palpation:  Bilateral radial pulse normal    Musculoskeletal:      Lower Extremity:     Inspection and Palpation:      Left knee:  Calf:  Soft and non tender  Pulses:  2+  Ecchymosis:  None  Warmth:  Appropriate  Incision:  Clean, dry, and intact.  Healing appropriately    ROM:  Left:  Extension: 5    flexion: 90      Deformities/Malalignments/Discrepancies:    Left:  none    Functional Testing:  Left:  Straight Leg Raise: 5/5      X-rays:  No evidence of fracture or loosening.  Implant well aligned.      Assessment:  1. Status post total left knee replacement        Plan:  1. Outpatient PT will be initiated per diagnosis protocol  2. Patient will be given a prescription for more oxycodone and Zofran she should continue her full dose aspirin  3. Remove Prineo today.  We placed Steri-Strips.  4. Overall patient is doing very well.  Recheck in 3 weeks.      Tristan Gonzalez MD  09/10/19  11:24 AM

## 2019-10-08 ENCOUNTER — OFFICE VISIT (OUTPATIENT)
Dept: ORTHOPEDIC SURGERY | Facility: CLINIC | Age: 79
End: 2019-10-08

## 2019-10-08 DIAGNOSIS — Z96.652 STATUS POST TOTAL LEFT KNEE REPLACEMENT: Primary | ICD-10-CM

## 2019-10-08 PROCEDURE — 99024 POSTOP FOLLOW-UP VISIT: CPT | Performed by: ORTHOPAEDIC SURGERY

## 2019-10-08 NOTE — PROGRESS NOTES
[facility] [place] [facility] [place] [facility] [place]    Subjective     Post-op (3 week follow up; 7 weeks status post total left knee replacement 8/21/19)       KRISTOPHER Clayton is a 79 y.o. female.  Patient is now 7 weeks out from left total knee arthroplasty.  She is doing quite well.  She feels like her therapist is very aggressive and overall is having minimal problems.  Her daughter, who is not here today, has had some questions and expressed some concern about her driving and lack of sleep.  She is taking Percocet 30 minutes prior to physical therapy.        Objective      Physical Exam  There were no vitals taken for this visit.    There is no height or weight on file to calculate BMI.        Ortho Exam  Range of motion   Incision is healing and free of erythema or drainage  Calf is soft and nontender  Patient son-in-law is with her today.    Imaging Reviewed:  Imaging Results (last 24 hours)     ** No results found for the last 24 hours. **            Assessment:  1. Status post total left knee replacement        Plan:  1. Status post left total knee arthroplasty--patient should continue with PT and work on terminal extension and flexion.  I will check her back in about 6 weeks to see how she is doing overall.  With regards to her return to driving, I have recommended that she not do so while still taking pain medications.  I recommend a 12-1/2 mg dose of Benadryl to help with sleep disturbance.  I will see her back as stated above.      Tristan Gonzalez MD  10/08/19  12:31 PM

## 2019-10-17 ENCOUNTER — OFFICE VISIT (OUTPATIENT)
Dept: ORTHOPEDIC SURGERY | Facility: CLINIC | Age: 79
End: 2019-10-17

## 2019-10-17 DIAGNOSIS — Z96.652 STATUS POST TOTAL LEFT KNEE REPLACEMENT: Primary | ICD-10-CM

## 2019-10-17 PROCEDURE — 99024 POSTOP FOLLOW-UP VISIT: CPT | Performed by: ORTHOPAEDIC SURGERY

## 2019-10-17 NOTE — PROGRESS NOTES
[facility] [place] [facility] [place]    Subjective     Post-op (1 week f/u- 8 weeks s/p (L) TKA 08/21/2019- patient fell)       KRISTOPHER Clayton is a 79 y.o. female.  Patient is worked in today for an injury that occurred earlier this week.  She fell doing her home therapy exercises.  She fell on her right side.  Her therapist want her seen before he continued therapy.  Overall, she is getting better.        Objective      Physical Exam  There were no vitals taken for this visit.    There is no height or weight on file to calculate BMI.        Ortho Exam  Patient's incisions are intact  Range of motion is unchanged compared to prior exams on the right side.  On the left side, range of motion is 0-110  1+ effusion bilaterally  Straight leg raise intact bilaterally    Imaging Reviewed:  Imaging Results (last 24 hours)     Procedure Component Value Units Date/Time    XR Knee 3 View Bilateral [[id number]] Resulted:  10/17/19 1130     Updated:  10/17/19 1134    Narrative:         Knee X-ray    Indication: status-post TKA    Study:  AP, Lateral, and Sunrise views of Bilateral knee    Comparison: Right knee 5/15/2018, left knee 9/10/2019    Findings:  No signs of acute fracture is visualized  No signs of loosening are appreciated  Components are well aligned    Impression:  Status post Bilateral total knee arthroplasty. No signs of   loosening or fracture.                Assessment:  1. Status post total left knee replacement        Plan:  1. Patient is cleared to resume physical therapy.  She likely has a mild injury to the right IT band.  She is improving and has no groin pain so the concern for any type of hip fracture is low.  I will see her back at her regular scheduled appointment.      Tristan Gonzalez MD  10/17/19  11:37 AM

## 2019-11-19 ENCOUNTER — OFFICE VISIT (OUTPATIENT)
Dept: ORTHOPEDIC SURGERY | Facility: CLINIC | Age: 79
End: 2019-11-19

## 2019-11-19 DIAGNOSIS — Z96.652 STATUS POST TOTAL LEFT KNEE REPLACEMENT: Primary | ICD-10-CM

## 2019-11-19 DIAGNOSIS — M17.12 PRIMARY OSTEOARTHRITIS OF LEFT KNEE: ICD-10-CM

## 2019-11-19 PROCEDURE — 99024 POSTOP FOLLOW-UP VISIT: CPT | Performed by: ORTHOPAEDIC SURGERY

## 2019-11-19 NOTE — PROGRESS NOTES
[facility] [place] [facility] [place]    Subjective     CC: Post-op (5 week f/u- 3 month s/p (L) TKA 08/21/2019)      HPI    Eli Clayton is a 79 y.o. female.  Patient is now 3 months out from left total knee arthroplasty on 8/21/2019.  She is doing well overall.  She continues to do therapy.      ROS:    Constiutional:Pt denies fever, chills, nausea, or vomiting.  MSK:as above    Objective      Past Medical History  Past Medical History:   Diagnosis Date   • Anesthesia complication     decreased oxygen and heart rate after knee surgery    • Arthritis     osteoarthritis   • Cancer (CMS/HCC)     cervical and skin   • Disease of thyroid gland    • GERD (gastroesophageal reflux disease)    • Hard of hearing     has hearing aid but does not wear it    • History of transfusion    • Interstitial cystitis    • PONV (postoperative nausea and vomiting)    • Wears dentures    • Wears glasses          Physical Exam  There were no vitals taken for this visit.    There is no height or weight on file to calculate BMI.    Patient is well nourished and well developed.        Ortho Exam  Range of motion   Incision is healing and free of erythema or drainage  Calf is soft and nontender    Imaging/Labs/EMG Reviewed:  Imaging Results (Last 24 Hours)     ** No results found for the last 24 hours. **          Assessment:  1. Status post total left knee replacement    2. Primary osteoarthritis of left knee        Plan:  1. Recommend over the counter anti-inflammatories for pain and/or swelling  2. Status post left total knee arthroplasty--patient is doing well overall.  I recommend she continue therapy to work on the last little bit of extension.  I will see her back in 3 months or sooner if necessary.      Tristan Gonzalez MD  11/19/19  12:50 PM

## 2020-02-09 NOTE — PROGRESS NOTES
[facility]    Acute pain service Inpatient Progress Note    Patient Name: Eli Clayton  :  1940  MRN:  [id number]        Acute Pain  Service Inpatient Progress Note:    Analgesia:Good  Pain scale: 2/10 anterior; 8/10 posterior and upper thigh.  LOC: alert and awake  Resp Status: room air  Cardiac: VS stable  Side Effects:None  Catheter Site:clean, dry and dressing intact  Cath type: peripheral nerve cath with ON Q  Infusion rate: 10ml/hr  Catheter Plan:Catheter to remain Insitu and Continue catheter infusion rate unchanged                                             Patient/Caregiver provided printed discharge information.

## 2020-02-20 ENCOUNTER — OFFICE VISIT (OUTPATIENT)
Dept: ORTHOPEDIC SURGERY | Facility: CLINIC | Age: 80
End: 2020-02-20

## 2020-02-20 VITALS — HEIGHT: 66 IN | BODY MASS INDEX: 26.68 KG/M2 | WEIGHT: 166 LBS | OXYGEN SATURATION: 97 % | HEART RATE: 87 BPM

## 2020-02-20 DIAGNOSIS — M25.511 RIGHT SHOULDER PAIN, UNSPECIFIED CHRONICITY: Primary | ICD-10-CM

## 2020-02-20 DIAGNOSIS — IMO0002 DISORDER OF ROTATOR CUFF SYNDROME OF RIGHT SHOULDER AND ALLIED DISORDER: ICD-10-CM

## 2020-02-20 DIAGNOSIS — Z96.652 STATUS POST TOTAL LEFT KNEE REPLACEMENT: ICD-10-CM

## 2020-02-20 PROCEDURE — 20610 DRAIN/INJ JOINT/BURSA W/O US: CPT | Performed by: ORTHOPAEDIC SURGERY

## 2020-02-20 PROCEDURE — 99214 OFFICE O/P EST MOD 30 MIN: CPT | Performed by: ORTHOPAEDIC SURGERY

## 2020-02-20 RX ORDER — LIDOCAINE HYDROCHLORIDE 10 MG/ML
3 INJECTION, SOLUTION EPIDURAL; INFILTRATION; INTRACAUDAL; PERINEURAL
Status: COMPLETED | OUTPATIENT
Start: 2020-02-20 | End: 2020-02-20

## 2020-02-20 RX ORDER — TRIAMCINOLONE ACETONIDE 40 MG/ML
40 INJECTION, SUSPENSION INTRA-ARTICULAR; INTRAMUSCULAR
Status: COMPLETED | OUTPATIENT
Start: 2020-02-20 | End: 2020-02-20

## 2020-02-20 RX ADMIN — TRIAMCINOLONE ACETONIDE 40 MG: 40 INJECTION, SUSPENSION INTRA-ARTICULAR; INTRAMUSCULAR at 11:26

## 2020-02-20 RX ADMIN — LIDOCAINE HYDROCHLORIDE 3 ML: 10 INJECTION, SOLUTION EPIDURAL; INFILTRATION; INTRACAUDAL; PERINEURAL at 11:26

## 2020-02-20 NOTE — PROGRESS NOTES
"    [facility] [place] [facility] Note        Subjective     CC: Pain of the Right Shoulder      HPI    Eli Clayton is a 79 y.o. female who presents with right shoulder pain.  Onset: atraumatic and gradual in nature. The issue has been ongoing for 6 month(s). Pain is a 8/10 on the pain scale. Pain is described as aching, burning and stabbing. Associated symptoms include pain and stiffness. The pain is worse with sitting, sleeping and any movement of the joint; heat and pain medication and/or NSAID improve the pain. Previous treatments have included: NSAIDS.    I have reviewed the following portions of the patient's history:History of Present Illness    Patient is here today for evaluation of her right shoulder.  She has fallen recently but after her knee was replaced, she was using her arms to push up a lot and this seemed to initiate her right shoulder pain.  She is doing well after her left knee replacement.         ROS:    Constiutional:Pt denies fever, chills, nausea, or vomiting.  MSK:as above        Objective      Past Medical History  Past Medical History:   Diagnosis Date   • Anesthesia complication     decreased oxygen and heart rate after knee surgery    • Arthritis     osteoarthritis   • Cancer (CMS/HCC)     cervical and skin   • Disease of thyroid gland    • GERD (gastroesophageal reflux disease)    • Hard of hearing     has hearing aid but does not wear it    • History of transfusion    • Interstitial cystitis    • PONV (postoperative nausea and vomiting)    • Wears dentures    • Wears glasses          Physical Exam  Pulse 87   Ht 166.4 cm (65.51\")   Wt 75.3 kg (166 lb)   SpO2 97%   BMI 27.19 kg/m²     Body mass index is 27.19 kg/m².    Patient is well nourished and well developed.        Ortho Exam  Musculoskeletal   Upper Extremity   Right Shoulder     Inspection and Palpation:     Medial border scapular tenderness-none    AC Joint Tenderness -moderate    Sensation is normal    Examination " reveals no ecchymosis.        Strength and Tone:    Supraspinatus -4 out of 5 with pain    External Lsnzcrrn-9-3 with pain    Infraspinatus - 5/5    Subscapularis -4-5 with pain    Deltoid - 5/5     Range of Motion      RightShoulder:    Internal Rotation: ROM - L4    External Rotation: AROM - 60 degrees    Elevation through flexion: AROM - 140 degrees        Impingement   Right shoulder    Lopez-Melecio impingement test positive    Neer impingement test negative     Functional Testing   Right shoulder    AC crossover adduction test positive    Drop arm sign positive    Apprehension relocation negative  Left knee: Range of motion 0-125  Straight leg raise intact  No effusion    Imaging/Labs/EMG Reviewed:  Imaging Results (Last 24 Hours)     Procedure Component Value Units Date/Time    XR Shoulder 2+ View Right [[id number]] Resulted:  02/20/20 1103     Updated:  02/20/20 1105    Narrative:       Right Shoulder X-Ray    Indication: Pain    Study:  AP, axillary lateral, and scapular Y views    Comparison: none    Findings:  No acute fractures are visualized  No bony lesions are visualized.  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 Right shoulder pain, unspecified chronicity    2. Status post total left knee replacement    3. Disorder of rotator cuff syndrome of right shoulder and allied disorder        Plan:  1. Recommend over the counter anti-inflammatories for pain and/or swelling  2. Right shoulder pain with rotator cuff syndrome--physical therapy will be requested.  She will do this at [place] [facility] [place].  Subacromial injection will be given today.  Follow-up in about 8 weeks we will see how she is doing overall.  If she is still limited and having  pain, consider further imaging but hopefully this will not be necessary.  3. Status post left total knee arthroplasty--patient is doing well overall.  Follow-up in 6 months or sooner if necessary.      Tristan Gonzalez MD  02/20/20  1:02 PM

## 2020-02-20 NOTE — PROGRESS NOTES
Procedure   Large Joint Arthrocentesis: R subacromial bursa  Date/Time: 2/20/2020 11:26 AM  Consent given by: patient  Site marked: site marked  Timeout: Immediately prior to procedure a time out was called to verify the correct patient, procedure, equipment, support staff and site/side marked as required   Supporting Documentation  Indications: pain   Procedure Details  Location: shoulder - R subacromial bursa  Preparation: Patient was prepped and draped in the usual sterile fashion  Needle size: 25 G  Approach: posterior  Medications administered: 3 mL lidocaine PF 1% 1 %; 40 mg triamcinolone acetonide 40 MG/ML  Patient tolerance: patient tolerated the procedure well with no immediate complications

## 2020-04-16 ENCOUNTER — OFFICE VISIT (OUTPATIENT)
Dept: ORTHOPEDIC SURGERY | Facility: CLINIC | Age: 80
End: 2020-04-16

## 2020-04-16 DIAGNOSIS — Z96.652 STATUS POST TOTAL LEFT KNEE REPLACEMENT: ICD-10-CM

## 2020-04-16 DIAGNOSIS — IMO0002 DISORDER OF ROTATOR CUFF SYNDROME OF RIGHT SHOULDER AND ALLIED DISORDER: ICD-10-CM

## 2020-04-16 DIAGNOSIS — M25.511 RIGHT SHOULDER PAIN, UNSPECIFIED CHRONICITY: Primary | ICD-10-CM

## 2020-04-16 DIAGNOSIS — S49.91XD INJURY OF RIGHT SHOULDER, SUBSEQUENT ENCOUNTER: ICD-10-CM

## 2020-04-16 PROCEDURE — 99442 PR PHYS/QHP TELEPHONE EVALUATION 11-20 MIN: CPT | Performed by: ORTHOPAEDIC SURGERY

## 2020-04-16 RX ORDER — SULFAMETHOXAZOLE AND TRIMETHOPRIM 800; 160 MG/1; MG/1
TABLET ORAL
COMMUNITY
Start: 2020-03-09 | End: 2021-01-05

## 2020-04-16 NOTE — PROGRESS NOTES
[facility] [place] [facility] Note        Subjective     CC: Follow-up (Right shoulder pain, unspecified chronicity - Rt. SA injection 02/20/20)      HPI    Eli Clayton is a 79 y.o. female.  Patient is contacted by telephone for follow-up of her right shoulder injury.  We have also discussed her left knee status post total knee arthroplasty in August 2019.    You have chosen to receive care through a telephone visit. Do you consent to use a telephone visit for your medical care today? Yes  ROS:    Constiutional:Pt denies fever, chills, nausea, or vomiting.  MSK:as above        Objective      Past Medical History  Past Medical History:   Diagnosis Date   • Anesthesia complication     decreased oxygen and heart rate after knee surgery    • Arthritis     osteoarthritis   • Cancer (CMS/HCC)     cervical and skin   • Disease of thyroid gland    • GERD (gastroesophageal reflux disease)    • Hard of hearing     has hearing aid but does not wear it    • History of transfusion    • Interstitial cystitis    • PONV (postoperative nausea and vomiting)    • Wears dentures    • Wears glasses      Telephone notes:  Patient's left knee is doing well overall.  Follow-up visit will be needed in 4 months for the left knee  Right shoulder did well after physical therapy but continues to hurt her.  She is interested in further imaging.  She continues to do her home exercises        Assessment    Assessment:  1. Right shoulder pain, unspecified chronicity    2. Disorder of rotator cuff syndrome of right shoulder and allied disorder    3. Injury of right shoulder, subsequent encounter    4. Status post total left knee replacement        Plan:  1. Recommend over the counter anti-inflammatories for pain and/or swelling  2. Status post left total knee arthroplasty in August 2019.  She will need to follow-up in 4 months time for x-rays of her left knee and one-year evaluation.  3. Right shoulder pain with rotator cuff syndrome and  injury to the right shoulder--MRI be requested.  Patient understands that it may take some time to get this completed but we will see her back to review that study and go from there.  4. A total of 11 minutes was spent on the phone with the patient.      Tristan Gonzalez MD  04/16/20  14:06

## 2020-05-08 ENCOUNTER — HOSPITAL ENCOUNTER (OUTPATIENT)
Dept: MRI IMAGING | Facility: HOSPITAL | Age: 80
Discharge: HOME | End: 2020-05-08
Admitting: ORTHOPAEDIC SURGERY

## 2020-05-08 DIAGNOSIS — M25.511 RIGHT SHOULDER PAIN, UNSPECIFIED CHRONICITY: ICD-10-CM

## 2020-05-08 DIAGNOSIS — S49.91XD INJURY OF RIGHT SHOULDER, SUBSEQUENT ENCOUNTER: ICD-10-CM

## 2020-05-08 DIAGNOSIS — IMO0002 DISORDER OF ROTATOR CUFF SYNDROME OF RIGHT SHOULDER AND ALLIED DISORDER: ICD-10-CM

## 2020-05-08 PROCEDURE — 73221 MRI JOINT UPR EXTREM W/O DYE: CPT

## 2020-05-11 DIAGNOSIS — M25.511 RIGHT SHOULDER PAIN, UNSPECIFIED CHRONICITY: Primary | ICD-10-CM

## 2020-05-11 DIAGNOSIS — IMO0002 DISORDER OF ROTATOR CUFF SYNDROME OF RIGHT SHOULDER AND ALLIED DISORDER: ICD-10-CM

## 2020-05-11 DIAGNOSIS — S49.91XD INJURY OF RIGHT SHOULDER, SUBSEQUENT ENCOUNTER: ICD-10-CM

## 2020-05-11 DIAGNOSIS — S46.011D TRAUMATIC INCOMPLETE TEAR OF RIGHT ROTATOR CUFF, SUBSEQUENT ENCOUNTER: ICD-10-CM

## 2020-08-11 ENCOUNTER — OFFICE VISIT (OUTPATIENT)
Dept: ORTHOPEDIC SURGERY | Facility: CLINIC | Age: 80
End: 2020-08-11

## 2020-08-11 VITALS — HEART RATE: 81 BPM | WEIGHT: 160 LBS | OXYGEN SATURATION: 99 % | HEIGHT: 66 IN | BODY MASS INDEX: 25.71 KG/M2

## 2020-08-11 DIAGNOSIS — S49.91XD INJURY OF RIGHT SHOULDER, SUBSEQUENT ENCOUNTER: ICD-10-CM

## 2020-08-11 DIAGNOSIS — Z96.652 STATUS POST TOTAL LEFT KNEE REPLACEMENT: Primary | ICD-10-CM

## 2020-08-11 DIAGNOSIS — M25.511 RIGHT SHOULDER PAIN, UNSPECIFIED CHRONICITY: ICD-10-CM

## 2020-08-11 DIAGNOSIS — S46.011D TRAUMATIC INCOMPLETE TEAR OF RIGHT ROTATOR CUFF, SUBSEQUENT ENCOUNTER: ICD-10-CM

## 2020-08-11 PROCEDURE — 20610 DRAIN/INJ JOINT/BURSA W/O US: CPT | Performed by: ORTHOPAEDIC SURGERY

## 2020-08-11 PROCEDURE — 99214 OFFICE O/P EST MOD 30 MIN: CPT | Performed by: ORTHOPAEDIC SURGERY

## 2020-08-11 RX ORDER — TRIAMCINOLONE ACETONIDE 40 MG/ML
40 INJECTION, SUSPENSION INTRA-ARTICULAR; INTRAMUSCULAR
Status: COMPLETED | OUTPATIENT
Start: 2020-08-11 | End: 2020-08-11

## 2020-08-11 RX ORDER — LIDOCAINE HYDROCHLORIDE 10 MG/ML
3 INJECTION, SOLUTION EPIDURAL; INFILTRATION; INTRACAUDAL; PERINEURAL
Status: COMPLETED | OUTPATIENT
Start: 2020-08-11 | End: 2020-08-11

## 2020-08-11 RX ADMIN — TRIAMCINOLONE ACETONIDE 40 MG: 40 INJECTION, SUSPENSION INTRA-ARTICULAR; INTRAMUSCULAR at 11:38

## 2020-08-11 RX ADMIN — LIDOCAINE HYDROCHLORIDE 3 ML: 10 INJECTION, SOLUTION EPIDURAL; INFILTRATION; INTRACAUDAL; PERINEURAL at 11:38

## 2020-08-11 ASSESSMENT — KOOS JR
KOOS JR SCORE: 3
KOOS JR SCORE: 79.914

## 2020-08-11 NOTE — PROGRESS NOTES
"    [facility] [place] [place] [facility] [place]        Subjective     CC: Follow-up of the Right Shoulder (MRI follow up; right shoulder pain-last cortisone injection 2/20/20) and Follow-up of the Left Knee (4 month follow up; 1 year s/p total knee replacement, left 8/21/19)      HPI    Eli Clayton is a 80 y.o. female.  Patient is here for follow-up of the MRI of her right shoulder.  She is also here for one-year follow-up after left total knee arthroplasty on 8/21/2019.  With regards to the knee, she is doing very well.  No complaints.  She is proud of herself.    With regards to the right shoulder, she still has trouble with anterior lateral shoulder pain.  She also hurts at the top of the shoulder.  She hurts in the biceps muscle belly as well.  MRI was done on 5/8/2020.    ROS:    Constiutional:Pt denies fever, chills, nausea, or vomiting.  MSK:as above        Objective      Past Medical History  Past Medical History:   Diagnosis Date   • Anesthesia complication     decreased oxygen and heart rate after knee surgery    • Arthritis     osteoarthritis   • Cancer (CMS/HCC)     cervical and skin   • Disease of thyroid gland    • GERD (gastroesophageal reflux disease)    • Hard of hearing     has hearing aid but does not wear it    • History of transfusion    • Interstitial cystitis    • PONV (postoperative nausea and vomiting)    • Wears dentures    • Wears glasses          Physical Exam  Pulse 81   Ht 166.4 cm (65.51\")   Wt 72.6 kg (160 lb)   SpO2 99%   BMI 26.21 kg/m²     Body mass index is 26.21 kg/m².    Patient is well nourished and well developed.        Ortho Exam  Peripheral Vascular:    Upper Extremity:   Inspection:  Left--no cyanotic nail beds Right--no cyanotic nail beds   Bilateral:  Pink nail beds with brisk capillary refill   Palpation:  Bilateral radial pulse normal    Musculoskeletal:      Lower Extremity:     Inspection and Palpation:      Left knee:  Calf:  Soft and non tender  Effusion:  " None  Pulses:  2+  Warmth:  None  Incision:  Healed     ROM:  Left:  Extension:0    Flexion:120      Deformities/Malalignments/Discrepancies:    Left:  none    Functional Testing:  Left:  Straight Leg Raise: 5/5  Patella Tracking:  Normal      Right shoulder: Forward elevation 150  Negative drop arm sign  External rotation 70  Tender to palpation at the AC joint  Positive crossover  5 out of 5 subscapularis with discomfort  4-5 supraspinatus with discomfort    Imaging/Labs/EMG Reviewed:  Imaging Results (Last 24 Hours)     Procedure Component Value Units Date/Time    XR Knee 3+ View With [place] Left [[id number]] Resulted:  08/11/20 1115     Updated:  08/11/20 1116    Narrative:         Knee X-ray    Indication: status-post TKA    Study:  AP, Lateral, and Sunrise views of Left knee    Comparison: Left knee 10/17/2019    Findings:  No signs of acute fracture is visualized  No signs of loosening are appreciated  Components are well aligned    Impression:  Status post Left total knee arthroplasty. No signs of   loosening or fracture.          We reviewed images and report of the MRI the patient's right shoulder from 5/8/2020.  Patient appears to have at least a partial injury to the subscapularis with thinning of the insertion.  Furthermore, there is grade 2 atrophy of the supraspinatus muscle belly and significant thinning of the supraspinatus tendon.  There is no proximal migration noted.  There is moderate AC joint arthritis noted.    IMPRESSION:  1. Questionable anterior superior glenoid labral tearing of limited  evaluation on this nonarthrographic evaluation seen best on axial  sequences without evidence for periosteal stripping or significant  effusion.  2. Mild increased signal within the infraspinatus concerning for  tendinopathy versus partial-thickness tearing, however, no retraction.      DICTATED:   05/08/2020  EDITED/ls :   05/09/2020      This report was finalized on 5/9/2020 7:01 PM by Dr. Calvillo  Key.    Assessment    Assessment:  1. Status post total left knee replacement    2. Right shoulder pain, unspecified chronicity    3. Injury of right shoulder, subsequent encounter    4. Traumatic incomplete tear of right rotator cuff, subsequent encounter        Plan:  1. Recommend over the counter anti-inflammatories for pain and/or swelling  2. Partial-thickness right rotator cuff tear involving the supraspinatus and subscapularis--patient is 80 years old and does not want to undergo surgical intervention.  I am not sure at this point what the ideal procedure for her would be either.  She maintains the ability to go overhead which is reassuring overall long-term.  Plan will be for a diagnostic and therapeutic injection into the subacromial space today.  I will see her back in 6 weeks and if she is no better, consider modalities to either the glenohumeral joint or AC joint.  3. Status post left total knee arthroplasty 8/2019--patient is doing very well overall.  X-rays are reassuring.  Follow-up in 1 year with x-rays of both knees.      Tristan Gonzalez MD  08/11/20  12:52

## 2020-08-11 NOTE — PROGRESS NOTES
Procedure   Large Joint Arthrocentesis: R subacromial bursa  Date/Time: 8/11/2020 11:38 AM  Consent given by: patient  Site marked: site marked  Timeout: Immediately prior to procedure a time out was called to verify the correct patient, procedure, equipment, support staff and site/side marked as required   Supporting Documentation  Indications: pain   Procedure Details  Location: shoulder - R subacromial bursa  Preparation: Patient was prepped and draped in the usual sterile fashion  Needle size: 25 G  Approach: posterior  Medications administered: 40 mg triamcinolone acetonide 40 MG/ML; 3 mL lidocaine PF 1% 1 %  Patient tolerance: patient tolerated the procedure well with no immediate complications

## 2020-09-22 ENCOUNTER — OFFICE VISIT (OUTPATIENT)
Dept: ORTHOPEDIC SURGERY | Facility: CLINIC | Age: 80
End: 2020-09-22

## 2020-09-22 VITALS — WEIGHT: 160.05 LBS | HEIGHT: 65 IN | HEART RATE: 80 BPM | BODY MASS INDEX: 26.67 KG/M2 | OXYGEN SATURATION: 97 %

## 2020-09-22 DIAGNOSIS — M19.011 ARTHRITIS OF RIGHT ACROMIOCLAVICULAR JOINT: ICD-10-CM

## 2020-09-22 DIAGNOSIS — S49.91XD INJURY OF RIGHT SHOULDER, SUBSEQUENT ENCOUNTER: ICD-10-CM

## 2020-09-22 DIAGNOSIS — M25.511 ARTHRALGIA OF RIGHT ACROMIOCLAVICULAR JOINT: Primary | ICD-10-CM

## 2020-09-22 DIAGNOSIS — S46.011D TRAUMATIC INCOMPLETE TEAR OF RIGHT ROTATOR CUFF, SUBSEQUENT ENCOUNTER: ICD-10-CM

## 2020-09-22 PROCEDURE — 99213 OFFICE O/P EST LOW 20 MIN: CPT | Performed by: ORTHOPAEDIC SURGERY

## 2020-09-22 PROCEDURE — 20605 DRAIN/INJ JOINT/BURSA W/O US: CPT | Performed by: ORTHOPAEDIC SURGERY

## 2020-09-22 RX ORDER — TRIAMCINOLONE ACETONIDE 40 MG/ML
20 INJECTION, SUSPENSION INTRA-ARTICULAR; INTRAMUSCULAR
Status: COMPLETED | OUTPATIENT
Start: 2020-09-22 | End: 2020-09-22

## 2020-09-22 RX ORDER — LIDOCAINE HYDROCHLORIDE 10 MG/ML
1 INJECTION, SOLUTION INFILTRATION; PERINEURAL
Status: COMPLETED | OUTPATIENT
Start: 2020-09-22 | End: 2020-09-22

## 2020-09-22 RX ADMIN — TRIAMCINOLONE ACETONIDE 20 MG: 40 INJECTION, SUSPENSION INTRA-ARTICULAR; INTRAMUSCULAR at 11:15

## 2020-09-22 RX ADMIN — LIDOCAINE HYDROCHLORIDE 1 ML: 10 INJECTION, SOLUTION INFILTRATION; PERINEURAL at 11:15

## 2020-09-22 NOTE — PROGRESS NOTES
"    [facility] [place] [facility] [place]        Subjective     CC: Follow-up (6 weeks- Right shoulder pain)      HPI    Eli Clayton is a 80 y.o. female.  Patient returns the office today for follow-up of her right shoulder pain.  She has a partially torn rotator cuff and got minimal relief after subacromial injection given on 8/11/2020.  An MRI was done.  She has anterior lateral shoulder pain and has pain at the top of the shoulder.    Overall, patient's symptoms are worsening.    ROS:    Constiutional:Pt denies fever, chills, nausea, or vomiting.  MSK:as above        Objective      Past Medical History  Past Medical History:   Diagnosis Date   • Anesthesia complication     decreased oxygen and heart rate after knee surgery    • Arthritis     osteoarthritis   • Cancer (CMS/HCC)     cervical and skin   • Disease of thyroid gland    • GERD (gastroesophageal reflux disease)    • Hard of hearing     has hearing aid but does not wear it    • History of transfusion    • Interstitial cystitis    • PONV (postoperative nausea and vomiting)    • Wears dentures    • Wears glasses          Physical Exam  Pulse 80   Ht 165.1 cm (65\")   Wt 72.6 kg (160 lb 0.9 oz)   SpO2 97%   BMI 26.63 kg/m²     Body mass index is 26.63 kg/m².    Patient is well nourished and well developed.        Ortho Exam  Forward elevation 150  External rotation 60  Tender to palpation at the AC joint  Positive crossover    Imaging/Labs/EMG Reviewed:  Imaging Results (Last 24 Hours)     ** No results found for the last 24 hours. **          Assessment    Assessment:  1. Arthralgia of right acromioclavicular joint    2. Injury of right shoulder, subsequent encounter    3. Traumatic incomplete tear of right rotator cuff, subsequent encounter    4. Arthritis of right acromioclavicular joint        Plan:  1. Recommend over the counter anti-inflammatories for pain and/or swelling  2. Chronic right shoulder pain with partial-thickness rotator cuff tear that " got no relief after a subacromial injection.  Patient also has AC joint arthritis.--Plan will be for a diagnostic therapeutic injection into the right AC joint.  We will see her back in 6 weeks and if she is no better, strongly consider modalities to the glenohumeral joint.    Procedure Note:    I discussed with the patient the potential benefits of performing a therapeutic injections as well as potential risks including but not limited to infection, swelling, pain, bleeding, bruising, nerve/vessel damage, skin color changes, transient elevation in blood glucose levels, and fat atrophy. After informed consent and after the areas were prepped with chlorhexadine soap, ethyl chloride was used to numb the skin. Via the superior approach, a combination of 1mL of 1% lidocaine and 20mg of Kenalog were injected into the AC joint of the right shoulder. The patient tolerated the procedure well. There were no complications. A sterile dressing was placed over the injection sites.        Tristan Gonzalez MD  09/22/20  13:05 EDT      Dragon disclaimer:  Much of this encounter note is an electronic transcription/translation of spoken language to printed text. The electronic translation of spoken language may permit erroneous, or at times, nonsensical words or phrases to be inadvertently transcribed; Although I have reviewed the note for such errors, some may still exist.

## 2020-09-22 NOTE — PROGRESS NOTES
Procedure   Medium Joint Arthrocentesis: R acromioclavicular  Date/Time: 9/22/2020 11:15 AM  Consent given by: patient  Site marked: site marked  Timeout: Immediately prior to procedure a time out was called to verify the correct patient, procedure, equipment, support staff and site/side marked as required   Supporting Documentation  Indications: pain   Procedure Details  Location: shoulder - R acromioclavicular  Preparation: Patient was prepped and draped in the usual sterile fashion  Needle size: 25 G  Approach: superior  Medications administered: 1 mL lidocaine 1 %; 20 mg triamcinolone acetonide 40 MG/ML

## 2020-11-02 NOTE — PROGRESS NOTES
"    [facility] [place] [facility] [place]        Subjective     CC: Follow-up (6 week f/u Arthralgia of right acromioclavicular joint )      HPI    Eli Clayton is a 80 y.o. female.  Patient returns for follow-up of her chronic right shoulder pain with partial-thickness rotator cuff tear and AC joint arthritis.  She got no relief after subacromial injection and at her last visit on 9/22/2020, she was injected into the right AC joint for diagnostic and therapeutic purposes.    Overall, patient's symptoms are slightly better after the AC joint injection but she still having pain.    ROS:    Constiutional:Pt denies fever, chills, nausea, or vomiting.  MSK:as above        Objective      Past Medical History  Past Medical History:   Diagnosis Date   • Anesthesia complication     decreased oxygen and heart rate after knee surgery    • Arthritis     osteoarthritis   • Cancer (CMS/HCC)     cervical and skin   • Disease of thyroid gland    • GERD (gastroesophageal reflux disease)    • Hard of hearing     has hearing aid but does not wear it    • History of transfusion    • Interstitial cystitis    • PONV (postoperative nausea and vomiting)    • Wears dentures    • Wears glasses          Physical Exam  Pulse 89   Ht 165.1 cm (65\")   Wt 77.6 kg (171 lb)   SpO2 95%   BMI 28.46 kg/m²     Body mass index is 28.46 kg/m².    Patient is well nourished and well developed.        Ortho Exam  Forward elevation 140  External rotation 70  Internal rotation L5  Mild weakness with subscap and supraspinatus testing    Imaging/Labs/EMG Reviewed:  Imaging Results (Last 24 Hours)     ** No results found for the last 24 hours. **          Assessment    Assessment:  1. Arthralgia of right acromioclavicular joint    2. Injury of right shoulder, subsequent encounter    3. Traumatic incomplete tear of right rotator cuff, subsequent encounter        Plan:  1. Recommend over the counter anti-inflammatories for pain and/or swelling  2. Chronic " right shoulder pain in the face of partial-thickness rotator cuff tear and AC joint arthritis--diagnostic and therapeutic injection will be given into the right glenohumeral joint.  See her back in about 8 weeks and hopefully we find that she is better overall.    Procedure Note:    I discussed with the patient the potential benefits of performing a therapeutic injections as well as potential risks including but not limited to infection, swelling, pain, bleeding, bruising, nerve/vessel damage, skin color changes, transient elevation in blood glucose levels, and fat atrophy. After informed consent and after the areas were prepped with chlorhexadine soap, ethyl chloride was used to numb the skin. Via the anterior approach, 3mL of 1% lidocaine followed by 40mg of Kenalog were each injected into the glenohumeral joint of the right shoulder. The patient tolerated the procedure well. There were no complications. A sterile dressing was placed over the injection sites.        Tristan Gonzalez MD  11/03/20  13:17 EST      Dragon disclaimer:  Much of this encounter note is an electronic transcription/translation of spoken language to printed text. The electronic translation of spoken language may permit erroneous, or at times, nonsensical words or phrases to be inadvertently transcribed; Although I have reviewed the note for such errors, some may still exist.

## 2020-11-03 ENCOUNTER — OFFICE VISIT (OUTPATIENT)
Dept: ORTHOPEDIC SURGERY | Facility: CLINIC | Age: 80
End: 2020-11-03

## 2020-11-03 VITALS — OXYGEN SATURATION: 95 % | HEIGHT: 65 IN | HEART RATE: 89 BPM | WEIGHT: 171 LBS | BODY MASS INDEX: 28.49 KG/M2

## 2020-11-03 DIAGNOSIS — M25.511 ARTHRALGIA OF RIGHT ACROMIOCLAVICULAR JOINT: Primary | ICD-10-CM

## 2020-11-03 DIAGNOSIS — S46.011D TRAUMATIC INCOMPLETE TEAR OF RIGHT ROTATOR CUFF, SUBSEQUENT ENCOUNTER: ICD-10-CM

## 2020-11-03 DIAGNOSIS — S49.91XD INJURY OF RIGHT SHOULDER, SUBSEQUENT ENCOUNTER: ICD-10-CM

## 2020-11-03 PROCEDURE — 99213 OFFICE O/P EST LOW 20 MIN: CPT | Performed by: ORTHOPAEDIC SURGERY

## 2020-11-03 PROCEDURE — 20610 DRAIN/INJ JOINT/BURSA W/O US: CPT | Performed by: ORTHOPAEDIC SURGERY

## 2020-11-03 RX ORDER — LIDOCAINE HYDROCHLORIDE 10 MG/ML
3 INJECTION, SOLUTION EPIDURAL; INFILTRATION; INTRACAUDAL; PERINEURAL
Status: COMPLETED | OUTPATIENT
Start: 2020-11-03 | End: 2020-11-03

## 2020-11-03 RX ORDER — TRIAMCINOLONE ACETONIDE 40 MG/ML
40 INJECTION, SUSPENSION INTRA-ARTICULAR; INTRAMUSCULAR
Status: COMPLETED | OUTPATIENT
Start: 2020-11-03 | End: 2020-11-03

## 2020-11-03 RX ORDER — INFLUENZA A VIRUS A/MICHIGAN/45/2015 X-275 (H1N1) ANTIGEN (FORMALDEHYDE INACTIVATED), INFLUENZA A VIRUS A/SINGAPORE/INFIMH-16-0019/2016 IVR-186 (H3N2) ANTIGEN (FORMALDEHYDE INACTIVATED), INFLUENZA B VIRUS B/PHUKET/3073/2013 ANTIGEN (FORMALDEHYDE INACTIVATED), AND INFLUENZA B VIRUS B/MARYLAND/15/2016 BX-69A ANTIGEN (FORMALDEHYDE INACTIVATED) 60; 60; 60; 60 UG/.7ML; UG/.7ML; UG/.7ML; UG/.7ML
INJECTION, SUSPENSION INTRAMUSCULAR
COMMUNITY
Start: 2020-10-31 | End: 2021-02-18

## 2020-11-03 RX ADMIN — TRIAMCINOLONE ACETONIDE 40 MG: 40 INJECTION, SUSPENSION INTRA-ARTICULAR; INTRAMUSCULAR at 10:52

## 2020-11-03 RX ADMIN — LIDOCAINE HYDROCHLORIDE 3 ML: 10 INJECTION, SOLUTION EPIDURAL; INFILTRATION; INTRACAUDAL; PERINEURAL at 10:52

## 2020-11-03 NOTE — PROGRESS NOTES
Procedure   Large Joint Arthrocentesis: R glenohumeral  Date/Time: 11/3/2020 10:52 AM  Consent given by: patient  Site marked: site marked  Timeout: Immediately prior to procedure a time out was called to verify the correct patient, procedure, equipment, support staff and site/side marked as required   Supporting Documentation  Indications: pain   Procedure Details  Location: shoulder - R glenohumeral  Preparation: Patient was prepped and draped in the usual sterile fashion  Needle size: 25 G  Approach: anterior  Medications administered: 40 mg triamcinolone acetonide 40 MG/ML; 3 mL lidocaine PF 1% 1 %  Patient tolerance: patient tolerated the procedure well with no immediate complications

## 2021-01-05 ENCOUNTER — OFFICE VISIT (OUTPATIENT)
Dept: ORTHOPEDIC SURGERY | Facility: CLINIC | Age: 81
End: 2021-01-05

## 2021-01-05 VITALS — OXYGEN SATURATION: 98 % | HEART RATE: 69 BPM | BODY MASS INDEX: 28.16 KG/M2 | WEIGHT: 169 LBS | HEIGHT: 65 IN

## 2021-01-05 DIAGNOSIS — S46.011D TRAUMATIC INCOMPLETE TEAR OF RIGHT ROTATOR CUFF, SUBSEQUENT ENCOUNTER: ICD-10-CM

## 2021-01-05 DIAGNOSIS — S49.91XD INJURY OF RIGHT SHOULDER, SUBSEQUENT ENCOUNTER: ICD-10-CM

## 2021-01-05 DIAGNOSIS — M25.511 ARTHRALGIA OF RIGHT ACROMIOCLAVICULAR JOINT: ICD-10-CM

## 2021-01-05 DIAGNOSIS — L03.113 CELLULITIS OF RIGHT ARM: Primary | ICD-10-CM

## 2021-01-05 PROCEDURE — 99214 OFFICE O/P EST MOD 30 MIN: CPT | Performed by: ORTHOPAEDIC SURGERY

## 2021-01-05 RX ORDER — SULFAMETHOXAZOLE AND TRIMETHOPRIM 800; 160 MG/1; MG/1
1 TABLET ORAL 2 TIMES DAILY
Qty: 14 TABLET | Refills: 0 | Status: SHIPPED | OUTPATIENT
Start: 2021-01-05 | End: 2021-01-12

## 2021-01-05 NOTE — PROGRESS NOTES
"    [facility] – [place] [facility] [place]        Subjective     CC: Follow-up (8 week f/u; Arthralgia of right acromioclavicular joint (right Glenohumeral cortisone injection 11/3/20))      KRISTOPHER Clayton is a 80 y.o. female.  Patient returns for follow-up of her chronic right shoulder pain today.  She is also complaining of a new problem with her right arm.  Over the last few days, she has noticed an area of redness over the right distal arm.  Denies fever, chills, nausea or vomiting.  She says she feels like her muscle has fallen.    Overall, patient's symptoms are better after the glenohumeral injection but she has a new problem today now regarding redness in the right arm.    ROS:    Constiutional:Pt denies fever, chills, nausea, or vomiting.  MSK:as above        Objective      Past Medical History  Past Medical History:   Diagnosis Date   • Anesthesia complication     decreased oxygen and heart rate after knee surgery    • Arthritis     osteoarthritis   • Cancer (CMS/HCC)     cervical and skin   • Disease of thyroid gland    • GERD (gastroesophageal reflux disease)    • Hard of hearing     has hearing aid but does not wear it    • History of transfusion    • Interstitial cystitis    • PONV (postoperative nausea and vomiting)    • Wears dentures    • Wears glasses          Physical Exam  Pulse 69   Ht 165.1 cm (65\")   Wt 76.7 kg (169 lb)   SpO2 98%   BMI 28.12 kg/m²     Body mass index is 28.12 kg/m².    Patient is well nourished and well developed.        Ortho Exam  Patient has an area of mild induration on the right anterior lateral arm  No pain with passive or active range of motion of the elbow  No clear evidence of a Tal sign  Erythema more than ecchymosis  Tender to palpation    Imaging/Labs/EMG Reviewed:  Imaging Results (Last 24 Hours)     ** No results found for the last 24 hours. **            Assessment    Assessment:  1. Cellulitis of right arm    2. Arthralgia of right acromioclavicular " joint    3. Injury of right shoulder, subsequent encounter    4. Traumatic incomplete tear of right rotator cuff, subsequent encounter        Plan:  1. Recommend over the counter anti-inflammatories for pain and/or swelling  2. Cellulitis right arm in the face of partial-thickness rotator cuff tear and right AC joint arthritis--patient's shoulder symptoms are better after the glenohumeral injection.  Observe for now.  She appears to be developing cellulitis of the right arm and would like to treat this aggressively early.  She will be started on Bactrim DS twice a day and I will see her back in a week and hopefully this is resolving.  If she is no better, we will get an MRI and looking for abscess.  She may need IV antibiotics if this is getting worse.      Tristan Gonzalez MD  01/05/21  11:28 EST      Dragon disclaimer:  Much of this encounter note is an electronic transcription/translation of spoken language to printed text. The electronic translation of spoken language may permit erroneous, or at times, nonsensical words or phrases to be inadvertently transcribed; Although I have reviewed the note for such errors, some may still exist.

## 2021-01-12 ENCOUNTER — OFFICE VISIT (OUTPATIENT)
Dept: ORTHOPEDIC SURGERY | Facility: CLINIC | Age: 81
End: 2021-01-12

## 2021-01-12 VITALS — WEIGHT: 169.09 LBS | HEART RATE: 92 BPM | OXYGEN SATURATION: 97 % | HEIGHT: 65 IN | BODY MASS INDEX: 28.17 KG/M2

## 2021-01-12 DIAGNOSIS — S46.011D TRAUMATIC INCOMPLETE TEAR OF RIGHT ROTATOR CUFF, SUBSEQUENT ENCOUNTER: ICD-10-CM

## 2021-01-12 DIAGNOSIS — L03.113 CELLULITIS OF RIGHT ARM: ICD-10-CM

## 2021-01-12 DIAGNOSIS — S49.91XD INJURY OF RIGHT SHOULDER, SUBSEQUENT ENCOUNTER: Primary | ICD-10-CM

## 2021-01-12 PROCEDURE — 99214 OFFICE O/P EST MOD 30 MIN: CPT | Performed by: ORTHOPAEDIC SURGERY

## 2021-01-12 NOTE — PROGRESS NOTES
"    [facility] [place] [facility] [place]        Subjective     CC: Follow-up (1 week follow up; Cellulitis of right arm)      HPI    Eli Clayton is a 80 y.o. female.  Patient is here today to follow-up on her right arm cellulitis.  She had an injury and had redness in the arm and some induration and we are concerned about cellulitis.  We gave her some antibiotics and this has helped her overall.  She says she still having pain in the biceps.  She denies fever or chills.    Overall, patient's symptoms are better with regards to the redness but still painful in the shoulder and in the deltopectoral groove.    ROS:    Constiutional:Pt denies fever, chills, nausea, or vomiting.  MSK:as above        Objective      Past Medical History  Past Medical History:   Diagnosis Date   • Anesthesia complication     decreased oxygen and heart rate after knee surgery    • Arthritis     osteoarthritis   • Cancer (CMS/HCC)     cervical and skin   • Disease of thyroid gland    • GERD (gastroesophageal reflux disease)    • Hard of hearing     has hearing aid but does not wear it    • History of transfusion    • Interstitial cystitis    • PONV (postoperative nausea and vomiting)    • Wears dentures    • Wears glasses          Physical Exam  Pulse 92   Ht 165.1 cm (65\")   Wt 76.7 kg (169 lb 1.5 oz)   SpO2 97%   BMI 28.14 kg/m²     Body mass index is 28.14 kg/m².    Patient is well nourished and well developed.        Ortho Exam  Musculoskeletal   Upper Extremity   Right Shoulder     Inspection and Palpation:     Medial border scapular tenderness-none    AC Joint Tenderness -minimal    Sensation is normal    Examination reveals ecchymosis but the indurated area seems better and the redness now seems more ecchymotic than anything else.      Strength and Tone:    Supraspinatus -4-5 with pain    External Kwhjitzm-7-0 with pain    Infraspinatus - 5/5    Subscapularis -4-5 with pain    Deltoid - 5/5     Range of Motion   "    RightShoulder:    Internal Rotation: ROM - L4    External Rotation: AROM - 60 degrees    Elevation through flexion: AROM - 140 degrees        Impingement   Right shoulder    Lopez-Melecio impingement test positive    Neer impingement test negative     Functional Testing   Right shoulder    AC crossover adduction test negative    Speeds test positive      Imaging/Labs/EMG Reviewed:  Imaging Results (Last 24 Hours)     ** No results found for the last 24 hours. **            Assessment    Assessment:  1. Injury of right shoulder, subsequent encounter    2. Cellulitis of right arm    3. Traumatic incomplete tear of right rotator cuff, subsequent encounter        Plan:  1. Recommend over the counter anti-inflammatories for pain and/or swelling  2. Right shoulder injury with rotator cuff syndrome--MRI be requested.  Patient tells me that her arm hurts bad enough that she would not want to live the way she is.  She is very independent overall.  We will get an MRI to see if she has a significant cuff tear.  Prior MRIs have shown only partial-thickness tearing.  3. Cellulitis right arm--resolved.      Tristan Gonzalez MD  01/12/21  14:46 EST      Dragon disclaimer:  Much of this encounter note is an electronic transcription/translation of spoken language to printed text. The electronic translation of spoken language may permit erroneous, or at times, nonsensical words or phrases to be inadvertently transcribed; Although I have reviewed the note for such errors, some may still exist.

## 2021-01-26 ENCOUNTER — HOSPITAL ENCOUNTER (OUTPATIENT)
Dept: MRI IMAGING | Facility: HOSPITAL | Age: 81
Discharge: HOME | End: 2021-01-26
Admitting: ORTHOPAEDIC SURGERY

## 2021-01-26 DIAGNOSIS — S46.011D TRAUMATIC INCOMPLETE TEAR OF RIGHT ROTATOR CUFF, SUBSEQUENT ENCOUNTER: ICD-10-CM

## 2021-01-26 DIAGNOSIS — L03.113 CELLULITIS OF RIGHT ARM: ICD-10-CM

## 2021-01-26 DIAGNOSIS — S49.91XD INJURY OF RIGHT SHOULDER, SUBSEQUENT ENCOUNTER: ICD-10-CM

## 2021-01-26 PROCEDURE — 73221 MRI JOINT UPR EXTREM W/O DYE: CPT

## 2021-02-09 ENCOUNTER — OFFICE VISIT (OUTPATIENT)
Dept: ORTHOPEDIC SURGERY | Facility: CLINIC | Age: 81
End: 2021-02-09

## 2021-02-09 ENCOUNTER — PREP FOR SURGERY (OUTPATIENT)
Dept: OTHER | Facility: HOSPITAL | Age: 81
End: 2021-02-09

## 2021-02-09 VITALS — WEIGHT: 169 LBS | HEART RATE: 81 BPM | HEIGHT: 65 IN | OXYGEN SATURATION: 98 % | BODY MASS INDEX: 28.16 KG/M2

## 2021-02-09 DIAGNOSIS — M19.011 ARTHRITIS OF RIGHT ACROMIOCLAVICULAR JOINT: ICD-10-CM

## 2021-02-09 DIAGNOSIS — Z96.651 STATUS POST TOTAL RIGHT KNEE REPLACEMENT: ICD-10-CM

## 2021-02-09 DIAGNOSIS — L03.113 CELLULITIS OF RIGHT ARM: ICD-10-CM

## 2021-02-09 DIAGNOSIS — S46.011D TRAUMATIC INCOMPLETE TEAR OF RIGHT ROTATOR CUFF, SUBSEQUENT ENCOUNTER: Primary | ICD-10-CM

## 2021-02-09 PROBLEM — S46.011A TRAUMATIC INCOMPLETE TEAR OF RIGHT ROTATOR CUFF: Status: ACTIVE | Noted: 2021-02-09

## 2021-02-09 PROCEDURE — 99214 OFFICE O/P EST MOD 30 MIN: CPT | Performed by: ORTHOPAEDIC SURGERY

## 2021-02-09 RX ORDER — CEFAZOLIN SODIUM 2 G/100ML
2 INJECTION, SOLUTION INTRAVENOUS ONCE
Status: CANCELLED | OUTPATIENT
Start: 2021-02-09 | End: 2021-02-09

## 2021-02-09 RX ORDER — ACETAMINOPHEN 500 MG
1000 TABLET ORAL ONCE
Status: CANCELLED | OUTPATIENT
Start: 2021-02-09 | End: 2021-02-09

## 2021-02-18 ENCOUNTER — PRE-ADMISSION TESTING (OUTPATIENT)
Dept: PREADMISSION TESTING | Facility: HOSPITAL | Age: 81
End: 2021-02-18

## 2021-02-18 VITALS — WEIGHT: 175 LBS | HEIGHT: 65 IN | BODY MASS INDEX: 29.16 KG/M2

## 2021-02-18 DIAGNOSIS — S46.011D TRAUMATIC INCOMPLETE TEAR OF RIGHT ROTATOR CUFF, SUBSEQUENT ENCOUNTER: ICD-10-CM

## 2021-02-18 DIAGNOSIS — M19.011 ARTHRITIS OF RIGHT ACROMIOCLAVICULAR JOINT: ICD-10-CM

## 2021-02-18 LAB
ANION GAP SERPL CALCULATED.3IONS-SCNC: 8 MMOL/L (ref 5–15)
BASOPHILS # BLD AUTO: 0.09 10*3/MM3 (ref 0–0.2)
BASOPHILS NFR BLD AUTO: 1.3 % (ref 0–1.5)
BUN SERPL-MCNC: 7 MG/DL (ref 8–23)
BUN/CREAT SERPL: 9.1 (ref 7–25)
CALCIUM SPEC-SCNC: 9.5 MG/DL (ref 8.6–10.5)
CHLORIDE SERPL-SCNC: 106 MMOL/L (ref 98–107)
CO2 SERPL-SCNC: 28 MMOL/L (ref 22–29)
CREAT SERPL-MCNC: 0.77 MG/DL (ref 0.57–1)
DEPRECATED RDW RBC AUTO: 46.9 FL (ref 37–54)
EOSINOPHIL # BLD AUTO: 0.09 10*3/MM3 (ref 0–0.4)
EOSINOPHIL NFR BLD AUTO: 1.3 % (ref 0.3–6.2)
ERYTHROCYTE [DISTWIDTH] IN BLOOD BY AUTOMATED COUNT: 13 % (ref 12.3–15.4)
GFR SERPL CREATININE-BSD FRML MDRD: 72 ML/MIN/1.73
GLUCOSE SERPL-MCNC: 136 MG/DL (ref 65–99)
HCT VFR BLD AUTO: 41 % (ref 34–46.6)
HGB BLD-MCNC: 13.2 G/DL (ref 12–15.9)
IMM GRANULOCYTES # BLD AUTO: 0.01 10*3/MM3 (ref 0–0.05)
IMM GRANULOCYTES NFR BLD AUTO: 0.1 % (ref 0–0.5)
LYMPHOCYTES # BLD AUTO: 1.27 10*3/MM3 (ref 0.7–3.1)
LYMPHOCYTES NFR BLD AUTO: 18.5 % (ref 19.6–45.3)
MCH RBC QN AUTO: 31 PG (ref 26.6–33)
MCHC RBC AUTO-ENTMCNC: 32.2 G/DL (ref 31.5–35.7)
MCV RBC AUTO: 96.2 FL (ref 79–97)
MONOCYTES # BLD AUTO: 0.49 10*3/MM3 (ref 0.1–0.9)
MONOCYTES NFR BLD AUTO: 7.1 % (ref 5–12)
NEUTROPHILS NFR BLD AUTO: 4.92 10*3/MM3 (ref 1.7–7)
NEUTROPHILS NFR BLD AUTO: 71.7 % (ref 42.7–76)
NRBC BLD AUTO-RTO: 0 /100 WBC (ref 0–0.2)
PLATELET # BLD AUTO: 298 10*3/MM3 (ref 140–450)
PMV BLD AUTO: 9.5 FL (ref 6–12)
POTASSIUM SERPL-SCNC: 4.5 MMOL/L (ref 3.5–5.2)
RBC # BLD AUTO: 4.26 10*6/MM3 (ref 3.77–5.28)
SODIUM SERPL-SCNC: 142 MMOL/L (ref 136–145)
WBC # BLD AUTO: 6.87 10*3/MM3 (ref 3.4–10.8)

## 2021-02-18 PROCEDURE — 85025 COMPLETE CBC W/AUTO DIFF WBC: CPT

## 2021-02-18 PROCEDURE — 80048 BASIC METABOLIC PNL TOTAL CA: CPT

## 2021-02-18 PROCEDURE — 36415 COLL VENOUS BLD VENIPUNCTURE: CPT

## 2021-02-18 RX ORDER — MULTIPLE VITAMINS W/ MINERALS TAB 9MG-400MCG
1 TAB ORAL DAILY
COMMUNITY

## 2021-02-18 NOTE — PAT
Patient to apply Chlorhexadine wipes  to surgical area (as instructed) the night before procedure and the AM of procedure. Wipes provided.    No RX for any other skin prep.    Patient instructed to drink 20 ounces (or until full) of Gatorade and it needs to be completed 1 hour before given arrival time for procedure (NO RED Gatorade)    Patient verbalized understanding.    Patient did not meet any criteria for EKG today.      Patient instructed to bring her home medications on the day of surgery. Medication list is not complete because she does not know the dosages of her medications.

## 2021-03-01 ENCOUNTER — APPOINTMENT (OUTPATIENT)
Dept: PREADMISSION TESTING | Facility: HOSPITAL | Age: 81
End: 2021-03-01

## 2021-03-01 PROCEDURE — C9803 HOPD COVID-19 SPEC COLLECT: HCPCS

## 2021-03-01 PROCEDURE — U0004 COV-19 TEST NON-CDC HGH THRU: HCPCS

## 2021-03-01 PROCEDURE — U0005 INFEC AGEN DETEC AMPLI PROBE: HCPCS

## 2021-03-02 ENCOUNTER — ANESTHESIA EVENT (OUTPATIENT)
Dept: PERIOP | Facility: HOSPITAL | Age: 81
End: 2021-03-02

## 2021-03-02 LAB — SARS-COV-2 RNA RESP QL NAA+PROBE: NOT DETECTED

## 2021-03-02 RX ORDER — FAMOTIDINE 10 MG/ML
20 INJECTION, SOLUTION INTRAVENOUS ONCE
Status: CANCELLED | OUTPATIENT
Start: 2021-03-02 | End: 2021-03-02

## 2021-03-03 ENCOUNTER — HOSPITAL ENCOUNTER (OUTPATIENT)
Facility: HOSPITAL | Age: 81
Discharge: HOME | End: 2021-03-03
Attending: ORTHOPAEDIC SURGERY | Admitting: ORTHOPAEDIC SURGERY

## 2021-03-03 ENCOUNTER — ANESTHESIA (OUTPATIENT)
Dept: PERIOP | Facility: HOSPITAL | Age: 81
End: 2021-03-03

## 2021-03-03 VITALS
HEIGHT: 65 IN | RESPIRATION RATE: 18 BRPM | HEART RATE: 84 BPM | DIASTOLIC BLOOD PRESSURE: 76 MMHG | OXYGEN SATURATION: 89 % | WEIGHT: 175 LBS | SYSTOLIC BLOOD PRESSURE: 126 MMHG | BODY MASS INDEX: 29.16 KG/M2 | TEMPERATURE: 97.6 F

## 2021-03-03 DIAGNOSIS — S46.011D TRAUMATIC INCOMPLETE TEAR OF RIGHT ROTATOR CUFF, SUBSEQUENT ENCOUNTER: ICD-10-CM

## 2021-03-03 DIAGNOSIS — M19.011 ARTHRITIS OF RIGHT ACROMIOCLAVICULAR JOINT: ICD-10-CM

## 2021-03-03 LAB — GLUCOSE BLDC GLUCOMTR-MCNC: 89 MG/DL (ref 70–130)

## 2021-03-03 PROCEDURE — 29827 SHO ARTHRS SRG RT8TR CUF RPR: CPT | Performed by: ORTHOPAEDIC SURGERY

## 2021-03-03 PROCEDURE — 63710000001 FAMOTIDINE 20 MG TABLET: Performed by: ANESTHESIOLOGY

## 2021-03-03 PROCEDURE — 29823 SHO ARTHRS SRG XTNSV DBRDMT: CPT | Performed by: ORTHOPAEDIC SURGERY

## 2021-03-03 PROCEDURE — 25010000002 PROPOFOL 10 MG/ML EMULSION: Performed by: NURSE ANESTHETIST, CERTIFIED REGISTERED

## 2021-03-03 PROCEDURE — C1713 ANCHOR/SCREW BN/BN,TIS/BN: HCPCS | Performed by: ORTHOPAEDIC SURGERY

## 2021-03-03 PROCEDURE — A9270 NON-COVERED ITEM OR SERVICE: HCPCS | Performed by: ANESTHESIOLOGY

## 2021-03-03 PROCEDURE — 63710000001 ACETAMINOPHEN 500 MG TABLET: Performed by: ORTHOPAEDIC SURGERY

## 2021-03-03 PROCEDURE — 25010000002 EPINEPHRINE PER 0.1 MG: Performed by: ORTHOPAEDIC SURGERY

## 2021-03-03 PROCEDURE — A9270 NON-COVERED ITEM OR SERVICE: HCPCS | Performed by: NURSE ANESTHETIST, CERTIFIED REGISTERED

## 2021-03-03 PROCEDURE — 25010000002 ROPIVACINE HCL-NACL: Performed by: NURSE ANESTHETIST, CERTIFIED REGISTERED

## 2021-03-03 PROCEDURE — L3670 SO ACRO/CLAV CAN WEB PRE OTS: HCPCS | Performed by: ORTHOPAEDIC SURGERY

## 2021-03-03 PROCEDURE — 76942 ECHO GUIDE FOR BIOPSY: CPT | Performed by: ORTHOPAEDIC SURGERY

## 2021-03-03 PROCEDURE — 25010000003 CEFAZOLIN IN DEXTROSE 2-4 GM/100ML-% SOLUTION: Performed by: ORTHOPAEDIC SURGERY

## 2021-03-03 PROCEDURE — 25010000002 DEXAMETHASONE PER 1 MG: Performed by: NURSE ANESTHETIST, CERTIFIED REGISTERED

## 2021-03-03 PROCEDURE — 25010000002 ONDANSETRON PER 1 MG: Performed by: NURSE ANESTHETIST, CERTIFIED REGISTERED

## 2021-03-03 PROCEDURE — 25010000002 NEOSTIGMINE 10 MG/10ML SOLUTION: Performed by: NURSE ANESTHETIST, CERTIFIED REGISTERED

## 2021-03-03 PROCEDURE — A9270 NON-COVERED ITEM OR SERVICE: HCPCS | Performed by: ORTHOPAEDIC SURGERY

## 2021-03-03 PROCEDURE — 63710000001 HYDROCODONE-ACETAMINOPHEN 5-325 MG TABLET: Performed by: NURSE ANESTHETIST, CERTIFIED REGISTERED

## 2021-03-03 PROCEDURE — 82962 GLUCOSE BLOOD TEST: CPT

## 2021-03-03 PROCEDURE — 25010000002 FENTANYL CITRATE (PF) 100 MCG/2ML SOLUTION: Performed by: NURSE ANESTHETIST, CERTIFIED REGISTERED

## 2021-03-03 DEVICE — MULTIFIX S-ULTRA 5.5MM KNOTLESS ANCHOR
Type: IMPLANTABLE DEVICE | Site: SHOULDER | Status: FUNCTIONAL
Brand: MULTIFIX

## 2021-03-03 DEVICE — HEALICOIL PK 4.5 MM SUTURE ANCHOR                                    WITH TWO ULTRABRAID NO.2 SUTURES                                    BLUE, BLUE-COBRAID STERILE
Type: IMPLANTABLE DEVICE | Site: SHOULDER | Status: FUNCTIONAL
Brand: HEALICOIL

## 2021-03-03 RX ORDER — SODIUM CHLORIDE 0.9 % (FLUSH) 0.9 %
3 SYRINGE (ML) INJECTION EVERY 12 HOURS SCHEDULED
Status: DISCONTINUED | OUTPATIENT
Start: 2021-03-03 | End: 2021-03-03 | Stop reason: HOSPADM

## 2021-03-03 RX ORDER — FENTANYL CITRATE 50 UG/ML
INJECTION, SOLUTION INTRAMUSCULAR; INTRAVENOUS
Status: DISCONTINUED | OUTPATIENT
Start: 2021-03-03 | End: 2021-03-03 | Stop reason: SURG

## 2021-03-03 RX ORDER — HYDROMORPHONE HYDROCHLORIDE 1 MG/ML
0.5 INJECTION, SOLUTION INTRAMUSCULAR; INTRAVENOUS; SUBCUTANEOUS
Status: DISCONTINUED | OUTPATIENT
Start: 2021-03-03 | End: 2021-03-03 | Stop reason: HOSPADM

## 2021-03-03 RX ORDER — MIDAZOLAM HYDROCHLORIDE 1 MG/ML
0.5 INJECTION INTRAMUSCULAR; INTRAVENOUS
Status: DISCONTINUED | OUTPATIENT
Start: 2021-03-03 | End: 2021-03-03 | Stop reason: HOSPADM

## 2021-03-03 RX ORDER — LIDOCAINE HYDROCHLORIDE 10 MG/ML
0.5 INJECTION, SOLUTION EPIDURAL; INFILTRATION; INTRACAUDAL; PERINEURAL ONCE AS NEEDED
Status: COMPLETED | OUTPATIENT
Start: 2021-03-03 | End: 2021-03-03

## 2021-03-03 RX ORDER — FENTANYL CITRATE 50 UG/ML
50 INJECTION, SOLUTION INTRAMUSCULAR; INTRAVENOUS
Status: DISCONTINUED | OUTPATIENT
Start: 2021-03-03 | End: 2021-03-03 | Stop reason: HOSPADM

## 2021-03-03 RX ORDER — CEFAZOLIN SODIUM 2 G/100ML
2 INJECTION, SOLUTION INTRAVENOUS ONCE
Status: COMPLETED | OUTPATIENT
Start: 2021-03-03 | End: 2021-03-03

## 2021-03-03 RX ORDER — MIDAZOLAM HYDROCHLORIDE 1 MG/ML
1 INJECTION INTRAMUSCULAR; INTRAVENOUS
Status: DISCONTINUED | OUTPATIENT
Start: 2021-03-03 | End: 2021-03-03 | Stop reason: HOSPADM

## 2021-03-03 RX ORDER — SODIUM CHLORIDE, SODIUM LACTATE, POTASSIUM CHLORIDE, CALCIUM CHLORIDE 600; 310; 30; 20 MG/100ML; MG/100ML; MG/100ML; MG/100ML
9 INJECTION, SOLUTION INTRAVENOUS CONTINUOUS
Status: DISCONTINUED | OUTPATIENT
Start: 2021-03-03 | End: 2021-03-03 | Stop reason: HOSPADM

## 2021-03-03 RX ORDER — ONDANSETRON 2 MG/ML
4 INJECTION INTRAMUSCULAR; INTRAVENOUS ONCE AS NEEDED
Status: DISCONTINUED | OUTPATIENT
Start: 2021-03-03 | End: 2021-03-03 | Stop reason: HOSPADM

## 2021-03-03 RX ORDER — ROCURONIUM BROMIDE 10 MG/ML
INJECTION, SOLUTION INTRAVENOUS AS NEEDED
Status: DISCONTINUED | OUTPATIENT
Start: 2021-03-03 | End: 2021-03-03 | Stop reason: SURG

## 2021-03-03 RX ORDER — DEXAMETHASONE SODIUM PHOSPHATE 4 MG/ML
INJECTION, SOLUTION INTRA-ARTICULAR; INTRALESIONAL; INTRAMUSCULAR; INTRAVENOUS; SOFT TISSUE AS NEEDED
Status: DISCONTINUED | OUTPATIENT
Start: 2021-03-03 | End: 2021-03-03 | Stop reason: SURG

## 2021-03-03 RX ORDER — PROMETHAZINE HYDROCHLORIDE 25 MG/1
25 TABLET ORAL ONCE AS NEEDED
Status: DISCONTINUED | OUTPATIENT
Start: 2021-03-03 | End: 2021-03-03 | Stop reason: HOSPADM

## 2021-03-03 RX ORDER — HYDROCODONE BITARTRATE AND ACETAMINOPHEN 5; 325 MG/1; MG/1
1 TABLET ORAL ONCE AS NEEDED
Status: DISCONTINUED | OUTPATIENT
Start: 2021-03-03 | End: 2021-03-03 | Stop reason: HOSPADM

## 2021-03-03 RX ORDER — SODIUM CHLORIDE 0.9 % (FLUSH) 0.9 %
3-10 SYRINGE (ML) INJECTION AS NEEDED
Status: DISCONTINUED | OUTPATIENT
Start: 2021-03-03 | End: 2021-03-03 | Stop reason: HOSPADM

## 2021-03-03 RX ORDER — SODIUM CHLORIDE 0.9 % (FLUSH) 0.9 %
10 SYRINGE (ML) INJECTION AS NEEDED
Status: DISCONTINUED | OUTPATIENT
Start: 2021-03-03 | End: 2021-03-03 | Stop reason: HOSPADM

## 2021-03-03 RX ORDER — ONDANSETRON 2 MG/ML
INJECTION INTRAMUSCULAR; INTRAVENOUS AS NEEDED
Status: DISCONTINUED | OUTPATIENT
Start: 2021-03-03 | End: 2021-03-03 | Stop reason: SURG

## 2021-03-03 RX ORDER — IPRATROPIUM BROMIDE AND ALBUTEROL SULFATE 2.5; .5 MG/3ML; MG/3ML
3 SOLUTION RESPIRATORY (INHALATION) ONCE AS NEEDED
Status: DISCONTINUED | OUTPATIENT
Start: 2021-03-03 | End: 2021-03-03 | Stop reason: HOSPADM

## 2021-03-03 RX ORDER — BUPIVACAINE HYDROCHLORIDE 2.5 MG/ML
INJECTION, SOLUTION EPIDURAL; INFILTRATION; INTRACAUDAL
Status: COMPLETED | OUTPATIENT
Start: 2021-03-03 | End: 2021-03-03

## 2021-03-03 RX ORDER — OXYCODONE HYDROCHLORIDE AND ACETAMINOPHEN 5; 325 MG/1; MG/1
1 TABLET ORAL ONCE AS NEEDED
Status: DISCONTINUED | OUTPATIENT
Start: 2021-03-03 | End: 2021-03-03 | Stop reason: HOSPADM

## 2021-03-03 RX ORDER — NEOSTIGMINE METHYLSULFATE 1 MG/ML
INJECTION, SOLUTION INTRAVENOUS AS NEEDED
Status: DISCONTINUED | OUTPATIENT
Start: 2021-03-03 | End: 2021-03-03 | Stop reason: SURG

## 2021-03-03 RX ORDER — DROPERIDOL 2.5 MG/ML
0.62 INJECTION, SOLUTION INTRAMUSCULAR; INTRAVENOUS ONCE AS NEEDED
Status: DISCONTINUED | OUTPATIENT
Start: 2021-03-03 | End: 2021-03-03 | Stop reason: HOSPADM

## 2021-03-03 RX ORDER — OXYCODONE HYDROCHLORIDE AND ACETAMINOPHEN 5; 325 MG/1; MG/1
1 TABLET ORAL EVERY 6 HOURS PRN
Qty: 20 TABLET | Refills: 0 | Status: SHIPPED | OUTPATIENT
Start: 2021-03-03 | End: 2021-03-08 | Stop reason: SDUPTHER

## 2021-03-03 RX ORDER — NALOXONE HCL 0.4 MG/ML
0.4 VIAL (ML) INJECTION AS NEEDED
Status: DISCONTINUED | OUTPATIENT
Start: 2021-03-03 | End: 2021-03-03 | Stop reason: HOSPADM

## 2021-03-03 RX ORDER — GLYCOPYRROLATE 0.2 MG/ML
INJECTION INTRAMUSCULAR; INTRAVENOUS AS NEEDED
Status: DISCONTINUED | OUTPATIENT
Start: 2021-03-03 | End: 2021-03-03 | Stop reason: SURG

## 2021-03-03 RX ORDER — FAMOTIDINE 20 MG/1
20 TABLET, FILM COATED ORAL ONCE
Status: COMPLETED | OUTPATIENT
Start: 2021-03-03 | End: 2021-03-03

## 2021-03-03 RX ORDER — LIDOCAINE HYDROCHLORIDE 10 MG/ML
INJECTION, SOLUTION EPIDURAL; INFILTRATION; INTRACAUDAL; PERINEURAL AS NEEDED
Status: DISCONTINUED | OUTPATIENT
Start: 2021-03-03 | End: 2021-03-03 | Stop reason: SURG

## 2021-03-03 RX ORDER — PROMETHAZINE HYDROCHLORIDE 25 MG/1
25 SUPPOSITORY RECTAL ONCE AS NEEDED
Status: DISCONTINUED | OUTPATIENT
Start: 2021-03-03 | End: 2021-03-03 | Stop reason: HOSPADM

## 2021-03-03 RX ORDER — SODIUM CHLORIDE, SODIUM LACTATE, POTASSIUM CHLORIDE, AND CALCIUM CHLORIDE .6; .31; .03; .02 G/100ML; G/100ML; G/100ML; G/100ML
IRRIGANT IRRIGATION AS NEEDED
Status: DISCONTINUED | OUTPATIENT
Start: 2021-03-03 | End: 2021-03-03 | Stop reason: HOSPADM

## 2021-03-03 RX ORDER — PROPOFOL 10 MG/ML
VIAL (ML) INTRAVENOUS AS NEEDED
Status: DISCONTINUED | OUTPATIENT
Start: 2021-03-03 | End: 2021-03-03 | Stop reason: SURG

## 2021-03-03 RX ORDER — SODIUM CHLORIDE 0.9 % (FLUSH) 0.9 %
10 SYRINGE (ML) INJECTION EVERY 12 HOURS SCHEDULED
Status: DISCONTINUED | OUTPATIENT
Start: 2021-03-03 | End: 2021-03-03 | Stop reason: HOSPADM

## 2021-03-03 RX ORDER — DROPERIDOL 2.5 MG/ML
0.62 INJECTION, SOLUTION INTRAMUSCULAR; INTRAVENOUS AS NEEDED
Status: DISCONTINUED | OUTPATIENT
Start: 2021-03-03 | End: 2021-03-03 | Stop reason: HOSPADM

## 2021-03-03 RX ORDER — ACETAMINOPHEN 500 MG
1000 TABLET ORAL ONCE
Status: COMPLETED | OUTPATIENT
Start: 2021-03-03 | End: 2021-03-03

## 2021-03-03 RX ADMIN — ROCURONIUM BROMIDE 40 MG: 10 INJECTION INTRAVENOUS at 08:07

## 2021-03-03 RX ADMIN — PROPOFOL 120 MG: 10 INJECTION, EMULSION INTRAVENOUS at 08:07

## 2021-03-03 RX ADMIN — CEFAZOLIN SODIUM 2 G: 2 INJECTION, SOLUTION INTRAVENOUS at 08:00

## 2021-03-03 RX ADMIN — SODIUM CHLORIDE, POTASSIUM CHLORIDE, SODIUM LACTATE AND CALCIUM CHLORIDE 9 ML/HR: 600; 310; 30; 20 INJECTION, SOLUTION INTRAVENOUS at 07:27

## 2021-03-03 RX ADMIN — SODIUM CHLORIDE, POTASSIUM CHLORIDE, SODIUM LACTATE AND CALCIUM CHLORIDE: 600; 310; 30; 20 INJECTION, SOLUTION INTRAVENOUS at 09:46

## 2021-03-03 RX ADMIN — GLYCOPYRROLATE 0.6 MG: 0.4 INJECTION INTRAMUSCULAR; INTRAVENOUS at 09:47

## 2021-03-03 RX ADMIN — BUPIVACAINE HYDROCHLORIDE 10 ML: 2.5 INJECTION, SOLUTION EPIDURAL; INFILTRATION; INTRACAUDAL at 07:45

## 2021-03-03 RX ADMIN — DEXAMETHASONE SODIUM PHOSPHATE 4 MG: 4 INJECTION, SOLUTION INTRA-ARTICULAR; INTRALESIONAL; INTRAMUSCULAR; INTRAVENOUS; SOFT TISSUE at 08:51

## 2021-03-03 RX ADMIN — FENTANYL CITRATE 100 MCG: 50 INJECTION, SOLUTION INTRAMUSCULAR; INTRAVENOUS at 07:45

## 2021-03-03 RX ADMIN — ACETAMINOPHEN 1000 MG: 500 TABLET ORAL at 07:26

## 2021-03-03 RX ADMIN — ONDANSETRON 4 MG: 2 INJECTION INTRAMUSCULAR; INTRAVENOUS at 08:58

## 2021-03-03 RX ADMIN — HYDROCODONE BITARTRATE AND ACETAMINOPHEN 1 TABLET: 5; 325 TABLET ORAL at 10:38

## 2021-03-03 RX ADMIN — NEOSTIGMINE 3.5 MG: 1 INJECTION INTRAVENOUS at 09:47

## 2021-03-03 RX ADMIN — LIDOCAINE HYDROCHLORIDE 50 MG: 10 INJECTION, SOLUTION EPIDURAL; INFILTRATION; INTRACAUDAL; PERINEURAL at 08:07

## 2021-03-03 RX ADMIN — LIDOCAINE HYDROCHLORIDE 0.2 ML: 10 INJECTION, SOLUTION EPIDURAL; INFILTRATION; INTRACAUDAL; PERINEURAL at 07:26

## 2021-03-03 RX ADMIN — ROPIVACAINE HYDROCHLORIDE 6 ML/HR: 2 INJECTION, SOLUTION EPIDURAL; INFILTRATION at 09:43

## 2021-03-03 RX ADMIN — FAMOTIDINE 20 MG: 20 TABLET, FILM COATED ORAL at 07:26

## 2021-03-03 NOTE — ANESTHESIA PROCEDURE NOTES
Peripheral Block    Pre-sedation assessment completed: 3/3/2021 7:30 AM    Patient reassessed immediately prior to procedure    Patient location during procedure: pre-op  Start time: 3/3/2021 7:30 AM  Reason for block: at surgeon's request and post-op pain management  Performed by  CRNA: Hilaria Kwok CRNA  Assisted by: Yair Mccollum CRNA  Preanesthetic Checklist  Completed: patient identified, site marked, surgical consent, pre-op evaluation, timeout performed, IV checked, risks and benefits discussed and monitors and equipment checked  Prep:  Sterile barriers:cap, gloves, mask and sterile barriers  Prep: ChloraPrep  Patient monitoring: blood pressure monitoring, continuous pulse oximetry and EKG  Procedure  Sedation:yes  Performed under: local infiltration  Guidance:ultrasound guided  Images:still images obtained, printed/placed on chart    Laterality:right  Block Type:interscalene  Injection Technique:catheter  Needle Type:Tuohy and echogenic  Needle Gauge:18 G  Resistance on Injection: none  Catheter Size:20 G (20g)  Cath Depth at skin: 9 cm    Medications Used: bupivacaine PF (MARCAINE) 0.25 % injection, 10 mL  fentaNYL citrate (PF) (SUBLIMAZE) injection, 100 mcg  Med admintered at 3/3/2021 7:45 AM      Medications  Preservative Free Saline:5ml    Post Assessment  Injection Assessment: negative aspiration for heme, no paresthesia on injection and incremental injection  Patient Tolerance:comfortable throughout block  Complications:no  Additional Notes  Procedure:                 The pt was placed in semifowlers position with a slight tilt of the thorax contralateral to the insertion site.  The Insertion Site was prepped and draped in sterile fashion.  The pt was anesthetized with  IV Sedation( see meds) and  Skin and cutaneous tissue was infiltrated and anesthetized with 1% Lidocaine 3 mls via a 25g needle.  Utilizing ultrasound guidance, a BBraun 4 inch 18 g Contiplex echogenic toWomenCentricy needle was advanced  in-plane.  Hydro dissection of tissue was achieved with Normal saline. Major vessels(carotid and Internal Jugular) where visualized as the brachial plexus was approached at the approximate level of C-7/ T-1.  Cervical 5 and Branches of Cervical 6 nerve roots where visualized and the needle tip was placed posterior at the level of C-6 roots.  LA spread was visualized and injection was made incrementally every 5 mls with aspiration. Injection pressure was normal or little, there was no intraneural injection, no vascular injection.      The BBraun 20 g wire stylet catheter was then placed under US guidance on the posterior aspect of the Brachial Plexus. The tuohy was removed and the location of catheter was confirmed with NS injection visualized with US . The skin was sealed with exofin tissue adhesive at catheter insertion site.  Skin was prepped with benzoin and the catheter was secured with steristrips and a CHG tegaderm. Appropriate labels applied. Thank You.

## 2021-03-03 NOTE — OP NOTE
Orthopaedics Operative Report    PREOPERATIVE DIAGNOSIS: Chronic right shoulder pain, rotator cuff tear, AC joint arthritis    POSTOPERATIVE DIAGNOSIS: Same and degenerative labral tear, unstable long head of the biceps tendon    PROCEDURE PERFORMED: Right shoulder arthroscopy, major debridement with subacromial decompression, biceps tenotomy, and debridement of labrum, arthroscopic rotator cuff repair    CPT: 01693, 56974    SURGEON: Tristan Gonzalez MD    ANESTHESIA:  General with Block    STAFF:  Circulator: Nieves Montes RN; Alexus Obando RN  Scrub Person: Yassine Moore  Nursing Assistant: Lona Osorio PCT    ESTIMATED BLOOD LOSS: Minimal    COMPLICATIONS: None apparent.    IMPLANTS: [employer] suture anchors x5.  4.5 mm double loaded helicoil x2 and multifix suture anchor x2    PREOPERATIVE ANTIBIOTICS: Kefzol    REFERRING PHYSICIAN: Heri Centeno MD    INDICATIONS: Pain, failure of nonoperative treatment    DESCRIPTION OF PROCEDURE:      After informed consent was obtained, the patient was taken to the operating room.  After the smooth induction of general anesthesia, the patient was gently placed in the beachchair taking care to pad all bony prominences.  Patient's block had been placed in the holding area.  Patient was then given a dose of IV antibiotics.  After sterile prep and drape of the right shoulder and timeout to verify the site and the procedure to be performed, we began with an incision and introduction of the camera through our posterior viewing portal.  Through the rotator interval anteriorly, we introduced a cannula.      Our diagnostic arthroscopy was begun intraarticularly.  There was no significant chondromalacia on the humeral head and glenoid.    The biceps tendon showed it to be unstable because there was an upper subscapularis tear.  For this reason, the long of the biceps was tenotomized and the labral stump debrided..  The superior labrum showed a degenerative  tear which was debrided using a shaver. Inspection of the subscapularis showed an upper one third tear.  The undersurface of the supraspinatus showed articular sided fraying which was debrided.  We introduced a cannula low into the rotator interval and then a second cannula was placed high in the rotator interval just anterior to the supraspinatus.  Lesser tuberosity was prepared and a 4.5 mm double suture anchor was placed.  We relayed the sutures in mattress fashion and these were secured with arthroscopic knots.  We had good restoration of the upper rolled edge after the knots were tied and good stability with passive range of motion.     We then went to the subacromial space and performed a thorough enough bursectomy to facilitate visualization after an anterior superior lateral portal was made.  Bursal sided inspection of the supraspinatus and infraspinatus showed a full-thickness injury to the entire supra and infraspinatus anteriorly.  Subacromial smoothing/decompression procedure was then performed.  We switch her viewing portal to posterior lateral.  Accessory portal was made for percutaneous entry of the suture anchors.    The tear was then debrided and the greater tuberosity was prepared.  Two 4.5 mm medial row suture anchors were placed and the sutures were then relayed through the tear in mattress fashion.    A total of 6 strands of suture were placed through the tendon.  They were relayed from anterior to posterior.  They were then tied from posterior to anterior.  We had good convergence of the cuff onto the greater tuberosity after the suture anchors were tied.  The suture strands were then taken to 2 multifix lateral row anchors for a double row construct.  We had good stability of the cuff tear with passive range of motion. We closed the portals using nylon suture.  A sling was applied to the arm after the drapes were removed and the patient was allowed to awaken from anesthesia.  All counts were  correct postoperatively and I performed the case.      Assistant: Boy Fontenot MD    The skilled assistance of the above noted first assistant was necessary during this complex surgical procedure.  The surgical assistant assisted with every aspect of the operation including, but not limited to, proper and safe positioning of the patient, obtaining adequate surgical exposure, manipulation of surgical instruments, suture management, surgical knot tying when necessary, the continual process of hemostasis during the procedure itself in addition to surgical wound closure and removal of the patient from the operating table and returning the patient back to the [facility].  The assistance of the surgical assistant allowed me to perform the most sensitive and technical potions of this operation using 2 hands, thus enhancing efficiency and patient safety.  This would not be possible without the help of a skilled assistant familiar with the procedure and capable of safely performing the aforementioned tasks.       POSTOPERATIVE PLAN:  1. Weight bearing status: Nonweightbearing right upper extremity  2.  Percocet and indwelling interscalene block for pain control  3. Follow-up in 2 weeks for wound check and suture removal  4. Keep incisions clean and dry  5. Sling at all times except for hygiene  6. Discharge home when cleared by anesthesia        Tristan Gonzalez M.D.*

## 2021-03-03 NOTE — ANESTHESIA PROCEDURE NOTES
Airway  Urgency: elective    Date/Time: 3/3/2021 8:09 AM  Airway not difficult    General Information and Staff    Patient location during procedure: OR  CRNA: Justin Cano CRNA    Indications and Patient Condition  Indications for airway management: airway protection    Preoxygenated: yes  MILS not maintained throughout  Mask difficulty assessment: 1 - vent by mask    Final Airway Details  Final airway type: endotracheal airway      Successful airway: ETT  Cuffed: yes   Successful intubation technique: direct laryngoscopy  Endotracheal tube insertion site: oral  Blade: Huerta  Blade size: 2  ETT size (mm): 7.0  Cormack-Lehane Classification: grade I - full view of glottis  Placement verified by: chest auscultation and capnometry   Measured from: lips  ETT/EBT  to lips (cm): 20  Number of attempts at approach: 1  Assessment: lips, teeth, and gum same as pre-op and atraumatic intubation    Additional Comments  Negative epigastric sounds, Breath sound equal bilaterally with symmetric chest rise and fall

## 2021-03-03 NOTE — INTERVAL H&P NOTE
"Pre-Op H&P (See Recent Office Note Attached for Full H&P)    Chief complaint: Right shoulder pain    HPI:      Patient is a 80 y.o. female who presents with right shoulder pain. MRI shows right rotator cuff tear. Surgical intervention is recommended and she is agreeable. She is here today for a right shoulder arthroscopy and repair of her torn rotator cuff.    Review of Systems:  General ROS:  no fever, chills, rashes.  No change since last office visit.  No recent sick exposure  Cardiovascular ROS: no chest pain or dyspnea on exertion  Respiratory ROS: no cough, shortness of breath, or wheezing  Endocrine ROS: +hypothyroidism    Meds:    No current facility-administered medications on file prior to encounter.      Current Outpatient Medications on File Prior to Encounter   Medication Sig Dispense Refill   • aspirin 81 MG chewable tablet Chew 1 tablet Daily. Resume in 6 weeks     • conjugated estrogens (Premarin) 0.625 MG/GM vaginal cream Insert 1 g into the vagina Daily.     • diphenhydrAMINE (BENADRYL) 25 MG tablet Take 25 mg by mouth Every 6 (Six) Hours As Needed for Itching.     • fexofenadine (ALLEGRA) 180 MG tablet Take 180 mg by mouth Daily.     • levothyroxine (SYNTHROID, LEVOTHROID) 75 MCG tablet Take 75 mcg by mouth Daily.     • Mirabegron (MYRBETRIQ PO) Take  by mouth.     • omeprazole (priLOSEC) 20 MG capsule Take 20 mg by mouth Daily.     • PARoxetine (PAXIL) 20 MG tablet Take 20 mg by mouth Every Morning.         Vital Signs:  /81 (BP Location: Right arm, Patient Position: Lying)   Pulse 83   Temp 98.1 °F (36.7 °C) (Temporal)   Resp 20   Ht 165.1 cm (65\")   Wt 79.4 kg (175 lb)   SpO2 92%   BMI 29.12 kg/m²     Physical Exam:    CV:  S1S2 regular rate and rhythm, no murmur               Resp:  Clear to auscultation; respirations regular, even and unlabored    Results Review:     Lab Results   Component Value Date    WBC 6.87 02/18/2021    HGB 13.2 02/18/2021    HCT 41.0 02/18/2021    MCV " 96.2 02/18/2021     02/18/2021    NEUTROABS 4.92 02/18/2021    GLUCOSE 136 (H) 02/18/2021    BUN 7 (L) 02/18/2021    CREATININE 0.77 02/18/2021    EGFRIFNONA 72 02/18/2021     02/18/2021    K 4.5 02/18/2021     02/18/2021    CO2 28.0 02/18/2021    CALCIUM 9.5 02/18/2021        I reviewed the patient's new clinical results.    Cancer Staging (if applicable)  Cancer Patient: __ yes _X_no __unknown; If yes, clinical stage T:__ N:__M:__, stage group or __N/A    Assessment: Right rotator cuff tear    Plan: Right shoulder arthroscopy with repair of rotator cuff tear      Savanna Coelho, APRN  3/3/2021   07:25 EST

## 2021-03-03 NOTE — ANESTHESIA PREPROCEDURE EVALUATION
Anesthesia Evaluation     history of anesthetic complications: PONV               Airway   Mallampati: I  TM distance: >3 FB  Neck ROM: full  No difficulty expected  Dental      Pulmonary    Cardiovascular     ECG reviewed        Neuro/Psych  (+) psychiatric history Depression,     GI/Hepatic/Renal/Endo    (+)  GERD,  thyroid problem hypothyroidism    Musculoskeletal     Abdominal    Substance History      OB/GYN          Other                        Anesthesia Plan    ASA 2     general   (Isnb/c)  intravenous induction     Anesthetic plan, all risks, benefits, and alternatives have been provided, discussed and informed consent has been obtained with: patient.    Plan discussed with CRNA.

## 2021-03-03 NOTE — ANESTHESIA POSTPROCEDURE EVALUATION
Patient: Eli Clayton    Procedure Summary     Date: 03/03/21 Room / Location:  [facility]  [facility]  [facility]    Anesthesia Start: 0800 Anesthesia Stop: 1002    Procedure: SHOULDER ARTHROSCOPY WITH ROTATOR CUFF REPAIR AND MAJOR DEBRIDEMENT - RIGHT (Right Shoulder) Diagnosis:       Traumatic incomplete tear of right rotator cuff, subsequent encounter      Arthritis of right acromioclavicular joint      (Traumatic incomplete tear of right rotator cuff, subsequent encounter [S46.011D])      (Arthritis of right acromioclavicular joint [M19.011])    Surgeon: Tristan Gonzalez MD Provider: Heri Gaffney MD    Anesthesia Type: general ASA Status: 2          Anesthesia Type: general    Vitals  Vitals Value Taken Time   /87 03/03/21 1015   Temp 97.1 °F (36.2 °C) 03/03/21 1000   Pulse 85 03/03/21 1019   Resp 16 03/03/21 1015   SpO2 94 % 03/03/21 1019   Vitals shown include unvalidated device data.        Post Anesthesia Care and Evaluation    Patient location during evaluation: PACU  Patient participation: complete - patient participated  Level of consciousness: awake and alert  Pain management: adequate  Airway patency: patent  Anesthetic complications: No anesthetic complications  PONV Status: none  Cardiovascular status: hemodynamically stable and acceptable  Respiratory status: nonlabored ventilation, acceptable and nasal cannula  Hydration status: acceptable

## 2021-03-04 NOTE — PROGRESS NOTES
EDEL Kunz    Nerve Cath Post Op Call    Patient Name: Eli Clayton  :  1940  MRN:  [id number]  Date of Discharge: 3/3/2021    Nerve Cath Post Op Call:    Analgesia:Good  Side Effects:None  Catheter Site:clean  Patient Controlled ON Q pump infusion rate: 6ml/hr  Catheter Plan:Will continue with plan at home without changes and The patient was instructed to call ON CALL Anesthesia provider for any questions or problems  Patient/Family instructed to call ON CALL anesthesia provider for any questions or problems.  Patient Follow Up:

## 2021-03-05 NOTE — PROGRESS NOTES
EDEL Kunz    Nerve Cath Post Op Call    Patient Name: Eli Clayton  :  1940  MRN:  [id number]  Date of Discharge: 3/3/2021    Nerve Cath Post Op Call:    Catheter Plan:Patient called, No answer. Message left to call [facility] pain service for any questions or complaints  Patient/Family instructed to call ON CALL anesthesia provider for any questions or problems.  Patient Follow Up:

## 2021-03-08 ENCOUNTER — TELEPHONE (OUTPATIENT)
Dept: ORTHOPEDIC SURGERY | Facility: CLINIC | Age: 81
End: 2021-03-08

## 2021-03-08 DIAGNOSIS — Z98.890 S/P COMPLETE REPAIR OF ROTATOR CUFF: Primary | ICD-10-CM

## 2021-03-08 RX ORDER — OXYCODONE HYDROCHLORIDE AND ACETAMINOPHEN 5; 325 MG/1; MG/1
1 TABLET ORAL EVERY 6 HOURS PRN
Qty: 20 TABLET | Refills: 0 | Status: SHIPPED | OUTPATIENT
Start: 2021-03-08 | End: 2021-03-16 | Stop reason: SDUPTHER

## 2021-03-08 NOTE — PROGRESS NOTES
Called, message left , pt then called back, cath tip out ,intact. Shoulder still sore, treating with po meds and ice.  Thank you

## 2021-03-08 NOTE — TELEPHONE ENCOUNTER
I left message to let Zena know that the Rx had been sent to the patient's pharmacy.    Mariella Moctezuma

## 2021-03-08 NOTE — TELEPHONE ENCOUNTER
PATIENT'S DAUGHTER JORGITO CALLED STATING THAT PATIENT ONLY HAS ONE PILL LEFT FOR PAIN. PATIENT WOULD LIKE A REFILL OF  oxyCODONE-acetaminophen (PERCOCET) 5-325 MG per tablet CALLED IN TO PHARMACY ON FILE. SHE WOULD LIKE A CALL BACK [phone].

## 2021-03-16 ENCOUNTER — OFFICE VISIT (OUTPATIENT)
Dept: ORTHOPEDIC SURGERY | Facility: CLINIC | Age: 81
End: 2021-03-16

## 2021-03-16 VITALS — TEMPERATURE: 97.1 F

## 2021-03-16 DIAGNOSIS — Z98.890 S/P COMPLETE REPAIR OF ROTATOR CUFF: Primary | ICD-10-CM

## 2021-03-16 PROCEDURE — 99024 POSTOP FOLLOW-UP VISIT: CPT | Performed by: PHYSICIAN ASSISTANT

## 2021-03-16 RX ORDER — OXYCODONE HYDROCHLORIDE AND ACETAMINOPHEN 5; 325 MG/1; MG/1
1 TABLET ORAL EVERY 6 HOURS PRN
Qty: 20 TABLET | Refills: 0 | Status: SHIPPED | OUTPATIENT
Start: 2021-03-16 | End: 2021-05-11

## 2021-03-16 NOTE — PROGRESS NOTES
[facility] [place] [facility] [place]        Subjective     Post-op (2 weeks Status Post SHOULDER ARTHROSCOPY WITH ROTATOR CUFF REPAIR AND MAJOR DEBRIDEMENT  - 3/3/21)       KRISTOPHER Clayton is a 80 y.o. female.  Patient presents for their initial postop visit following right shoulder arthroscopy with biceps tenodesis, arthroscopic rotator cuff repair (supraspinatus, infraspinatus, subscapularis) and SAD performed on the above date by Dr. Gonzalez.    Patient currently endorses a pain scale of 5/10.  No numbness or tingling into the extremity.  She had noticed swelling along the entire arm.  She reports that she had a rash with blisters following the procedure secondary to the tape that was applied to her shoulder (neck/upper trap area, patient does have a history of latex allergy).    No reported fever, chills, night sweats or other constitutional symptoms.        Objective      Physical Exam  Temp 97.1 °F (36.2 °C)     There is no height or weight on file to calculate BMI.        Ortho Exam  Peripheral Vascular   Right Upper Extremity    No cyanotic nail beds    Pink nail beds and rapid capillary refill   Palpation    Radial Pulse - Bilaterally normal    Musculoskeletal   Upper Extremity   Right Shoulder    Inspection and palpation: Positive resolving ecchymosis noted along anterior arm and chest wall    Tenderness -moderate    Swelling -mild    Sensation - normal    Incision-healing appropriately with sutures in place.  No redness, warmth, drainage or evidence of infection.    Motor: Grossly intact axillary, MSC, radial, ulnar, median, AIN, PIN.    Sensory: Grossly intact to light touch axillary, MSC, radial, ulnar, median nerve distributions.    Vascular: 2+ radial pulse with brisk capillary refill noted and each digit.    Patient has near normal range of motion of the elbow with some stiffness noted.      Imaging Reviewed:  No new imaging today.      Assessment:  1. S/P complete repair of rotator cuff         Plan:  1. Status post right shoulder arthroscopy with arthroscopic rotator cuff repair, biceps tenotomy, SAD--stable.  2. Sutures were removed and replaced with Steri-Strips today.  3. Reviewed arthroscopic images with patient.  4. Patient was shown group of 5 shoulder exercises.  5. Continue using postoperative sling except when performing hygiene or group of 5 exercises.  Pillow portion of the sling was removed today.  This does include sleeping in the sling.  6. Ordered refill of patient's pain medication.  7. Follow-up 4 weeks with Dr. Gonzalez.  8. Questions and concerns answered.      Danielle Mahajan PA-C  03/16/21  13:01 ALBERTOT      Dragon disclaimer:  Much of this encounter note is an electronic transcription/translation of spoken language to printed text. The electronic translation of spoken language may permit erroneous, or at times, nonsensical words or phrases to be inadvertently transcribed; Although I have reviewed the note for such errors, some may still exist.

## 2021-04-13 ENCOUNTER — OFFICE VISIT (OUTPATIENT)
Dept: ORTHOPEDIC SURGERY | Facility: CLINIC | Age: 81
End: 2021-04-13

## 2021-04-13 DIAGNOSIS — Z98.890 S/P COMPLETE REPAIR OF ROTATOR CUFF: Primary | ICD-10-CM

## 2021-04-13 PROCEDURE — 99024 POSTOP FOLLOW-UP VISIT: CPT | Performed by: ORTHOPAEDIC SURGERY

## 2021-04-13 RX ORDER — SOLIFENACIN SUCCINATE 10 MG/1
TABLET, FILM COATED ORAL
COMMUNITY
Start: 2021-03-22

## 2021-04-13 NOTE — PROGRESS NOTES
[facility] [place] [facility] Note        Subjective     Post-op (4 week recheck- 6 weeks Status Post SHOULDER ARTHROSCOPY WITH ROTATOR CUFF REPAIR AND MAJOR DEBRIDEMENT  - 3/3/21)       KRISTOPHER Clayton is a 80 y.o. female.  Patient is now 6 weeks out from right shoulder arthroscopy with rotator cuff repair and major debridement on 3/3/2021.  She had repair of the upper subscap and a repair of a large supraspinatus tear with biceps tenotomy.  We left the AC joint alone.          Objective      Physical Exam  There were no vitals taken for this visit.    There is no height or weight on file to calculate BMI.        Ortho Exam  Peripheral Vascular:    Upper Extremity:   Inspection:  Left--no cyanotic nail beds Right--no cyanotic nail beds   Bilateral:  Pink nail beds with brisk capillary refill   Palpation:  Bilateral radial pulse normal    Musculoskeletal:  Global Assessment:  Overall assessment of Lower Extremity Muscle Strength and Tone:    Right quadriceps--5/5      Lower Extremity:  Knee:      Inspection and Palpation:      Right knee:    Effusion:  1+  Crepitus:  none  Pulses:  2+  Ecchymosis:  None  Warmth:  None  Incision:  No erythema, healing appropriately   Calf:  Non tender    ROM:  Right:  Extension:0    Flexion:115          Imaging Reviewed:  Imaging Results (Last 24 Hours)     ** No results found for the last 24 hours. **            Assessment    Assessment:  1. S/P complete repair of rotator cuff        Plan:  1. Status post right rotator cuff repair--patient will begin PT per diagnosis protocol at [facility] in [place].  I will see her back in a month.  Wean from the sling.      Tristan Gonzalez MD  04/13/21  13:14 EDT      Dragon disclaimer:  Much of this encounter note is an electronic transcription/translation of spoken language to printed text. The electronic translation of spoken language may permit erroneous, or at times, nonsensical words or phrases to be  inadvertently transcribed; Although I have reviewed the note for such errors, some may still exist.

## 2021-05-11 ENCOUNTER — OFFICE VISIT (OUTPATIENT)
Dept: ORTHOPEDIC SURGERY | Facility: CLINIC | Age: 81
End: 2021-05-11

## 2021-05-11 DIAGNOSIS — Z98.890 S/P COMPLETE REPAIR OF ROTATOR CUFF: Primary | ICD-10-CM

## 2021-05-11 PROCEDURE — 99024 POSTOP FOLLOW-UP VISIT: CPT | Performed by: ORTHOPAEDIC SURGERY

## 2021-05-11 RX ORDER — ACETAMINOPHEN 500 MG
500 TABLET ORAL AS NEEDED
COMMUNITY

## 2021-05-11 NOTE — PROGRESS NOTES
[facility] – [place] [facility]opa[facility] Surgery Clinic Note        Subjective     Post-op (4 week recheck -  Status Post SHOULDER ARTHROSCOPY WITH ROTATOR CUFF REPAIR AND MAJOR DEBRIDEMENT  - 3/3/21)       KRISTOPHER Clayton is a 81 y.o. female.  Patient is here today now 2 months out from right shoulder arthroscopy, repair of an upper subscap tear and repair of a large supraspinatus tear with biceps tenotomy.  AC joint was left alone.  She is doing well overall.  She is with her daughter today.  She continues to wear her sling.          Objective      Physical Exam  There were no vitals taken for this visit.    There is no height or weight on file to calculate BMI.        Ortho Exam  Peripheral Vascular   Right Upper Extremity    No cyanotic nail beds    Pink nail beds and rapid capillary refill   Palpation    Radial Pulse - Bilaterally normal    Musculoskeletal   Upper Extremity   Right Shoulder    Inspection and palpation:    Tenderness - mild    Swelling - none    Sensation - normal    Incision-healing appropriately, no drainage   ROJM:   Right:   External Rotation: PROM - 30 degrees   Elevation through flexion: PROM - 100 degrees      Imaging Reviewed:  Imaging Results (Last 24 Hours)     ** No results found for the last 24 hours. **             Assessment    Assessment:  1. S/P complete repair of rotator cuff        Plan:  1. Status post rotator cuff repair--patient is a little bit stiff but she is only 8 weeks out.  We told her to wean from the sling.  We have told her that she will not retear by stretching.  See her back in 6 weeks and reassess range of motion.      Tristan Gonzalez MD  05/11/21  13:08 EDT      Dragon disclaimer:  Much of this encounter note is an electronic transcription/translation of spoken language to printed text. The electronic translation of spoken language may permit erroneous, or at times, nonsensical words or phrases to be inadvertently transcribed; Although I have reviewed the note for such  errors, some may still exist.

## 2021-06-24 ENCOUNTER — OFFICE VISIT (OUTPATIENT)
Dept: ORTHOPEDIC SURGERY | Facility: CLINIC | Age: 81
End: 2021-06-24

## 2021-06-24 VITALS
HEIGHT: 65 IN | BODY MASS INDEX: 27.59 KG/M2 | SYSTOLIC BLOOD PRESSURE: 146 MMHG | HEART RATE: 84 BPM | WEIGHT: 165.6 LBS | DIASTOLIC BLOOD PRESSURE: 72 MMHG

## 2021-06-24 DIAGNOSIS — Z98.890 S/P COMPLETE REPAIR OF ROTATOR CUFF: Primary | ICD-10-CM

## 2021-06-24 PROCEDURE — 99213 OFFICE O/P EST LOW 20 MIN: CPT | Performed by: PHYSICIAN ASSISTANT

## 2021-06-24 NOTE — PROGRESS NOTES
"    [facility] [place] [facility] [place]        Subjective     CC: Follow-up (6 week f/u; 3.5 months S/P SHOULDER ARTHROSCOPY WITH ROTATOR CUFF REPAIR AND MAJOR DEBRIDEMENT  - 3/3/21)      KRISTOPHER Clayton is a 81 y.o. female. Patient returns for follow up of her right shoulder.  Notes improved motion and strength.  Still working with formal PT. No numbness or tingling into the extremity.    Overall, patient's symptoms are improved.    ROS:    Constiutional:Pt denies fever, chills, nausea, or vomiting.  MSK:as above        Objective      Past Medical History  Past Medical History:   Diagnosis Date   • Anesthesia complication     decreased oxygen and heart rate after knee surgery    • Arthritis     osteoarthritis   • Cancer (CMS/HCC)     cervical and skin   • Disease of thyroid gland    • GERD (gastroesophageal reflux disease)    • Hard of hearing     has hearing aid but does not wear it    • History of transfusion    • Interstitial cystitis    • PONV (postoperative nausea and vomiting)    • Wears dentures    • Wears glasses          Physical Exam  /72   Pulse 84   Ht 165.1 cm (65\")   Wt 75.1 kg (165 lb 9.6 oz)   BMI 27.56 kg/m²     Body mass index is 27.56 kg/m².    Patient is well nourished and well developed.        Ortho Exam  Right shoulder  Skin: Portal well healed.  Tenderness: mild discomfort only  Motion: , ER 45, IR L5  Motor/sensory: Grossly intact C5-T1.      Imaging/Labs/EMG Reviewed:  No new imaging today.       Assessment:  1. S/P complete repair of rotator cuff        Plan:  1. Status post right rotator cuff repair, doing well.  2. Has noted improvement motion and decrease in pain.  3. Continue working with outpatient PT.  4. Recommend OTC pain medication as needed.  5. Follow up in 4 weeks with Dr. Gonzalez.  6. Questions and concerns answered.      Danielle Mahajan PA-C  06/24/21  21:36 EDT      Dragon disclaimer:  Much of this encounter note is an electronic " transcription/translation of spoken language to printed text. The electronic translation of spoken language may permit erroneous, or at times, nonsensical words or phrases to be inadvertently transcribed; Although I have reviewed the note for such errors, some may still exist.

## 2021-07-22 ENCOUNTER — OFFICE VISIT (OUTPATIENT)
Dept: ORTHOPEDIC SURGERY | Facility: CLINIC | Age: 81
End: 2021-07-22

## 2021-07-22 VITALS
SYSTOLIC BLOOD PRESSURE: 141 MMHG | HEIGHT: 65 IN | DIASTOLIC BLOOD PRESSURE: 65 MMHG | BODY MASS INDEX: 27.58 KG/M2 | HEART RATE: 74 BPM | WEIGHT: 165.57 LBS

## 2021-07-22 DIAGNOSIS — Z98.890 S/P COMPLETE REPAIR OF ROTATOR CUFF: Primary | ICD-10-CM

## 2021-07-22 PROCEDURE — 99213 OFFICE O/P EST LOW 20 MIN: CPT | Performed by: ORTHOPAEDIC SURGERY

## 2021-07-22 NOTE — PROGRESS NOTES
"    [facility] [place] [facility] [place] [facility] [place]        Subjective     CC: Follow-up (4 week f/u; 4.5 months S/P SHOULDER ARTHROSCOPY WITH ROTATOR CUFF REPAIR AND MAJOR DEBRIDEMENT  - 3/3/21)      KRISTOPHER Clayton is a 81 y.o. female. Patient is here today now 4 and half months out from right shoulder arthroscopy with rotator cuff repair. She is doing well overall. No major complaints.    Overall, patient's symptoms are much better.    ROS:    Constiutional:Pt denies fever, chills, nausea, or vomiting.  MSK:as above        Objective      Past Medical History  Past Medical History:   Diagnosis Date   • Anesthesia complication     decreased oxygen and heart rate after knee surgery    • Arthritis     osteoarthritis   • Cancer (CMS/HCC)     cervical and skin   • Disease of thyroid gland    • GERD (gastroesophageal reflux disease)    • Hard of hearing     has hearing aid but does not wear it    • History of transfusion    • Interstitial cystitis    • PONV (postoperative nausea and vomiting)    • Wears dentures    • Wears glasses          Physical Exam  /65   Pulse 74   Ht 165.1 cm (65\")   Wt 75.1 kg (165 lb 9.1 oz)   BMI 27.55 kg/m²     Body mass index is 27.55 kg/m².    Patient is well nourished and well developed.        Ortho Exam  Musculoskeletal   Upper Extremity   Right Shoulder       Strength and Tone:    Supraspinatus -5-5    External Rotators-5/5    Infraspinatus - 5/5    Subscapularis - 5/5    Deltoid - 5/5     Range of Motion      Right Shoulder:    Internal Rotation: ROM - L4    External Rotation: AROM - 70 degrees    Elevation through flexion: AROM - 140 degrees     AC joint:  non tender to palpation    AC joint:  negative crossover      Imaging/Labs/EMG Reviewed:  Imaging Results (Last 24 Hours)     ** No results found for the last 24 hours. **            Assessment    Assessment:  1. S/P complete repair of rotator cuff        Plan:  1. Recommend over the counter anti-inflammatories for pain and/or " swelling  2. Status post rotator cuff repair right shoulder--patient doing well overall. At this point, I think she can be graduated from therapy. I had like to release her today. She needs to exercise caution with overhead activity and heavy lifting away from her body. Follow-up as needed for the shoulder. Dominant to see her sometime end of August early September for follow-up of her bilateral knee replacements.      Tristan Gonzalez MD  07/22/21  13:21 ALBERTOT      Dragon disclaimer:  Much of this encounter note is an electronic transcription/translation of spoken language to printed text. The electronic translation of spoken language may permit erroneous, or at times, nonsensical words or phrases to be inadvertently transcribed; Although I have reviewed the note for such errors, some may still exist.

## 2021-09-23 ENCOUNTER — OFFICE VISIT (OUTPATIENT)
Dept: ORTHOPEDIC SURGERY | Facility: CLINIC | Age: 81
End: 2021-09-23

## 2021-09-23 VITALS
BODY MASS INDEX: 27.86 KG/M2 | WEIGHT: 167.2 LBS | DIASTOLIC BLOOD PRESSURE: 72 MMHG | HEART RATE: 69 BPM | SYSTOLIC BLOOD PRESSURE: 135 MMHG | HEIGHT: 65 IN

## 2021-09-23 DIAGNOSIS — Z98.890 S/P COMPLETE REPAIR OF ROTATOR CUFF: ICD-10-CM

## 2021-09-23 DIAGNOSIS — Z96.653 STATUS POST TOTAL BILATERAL KNEE REPLACEMENT: Primary | ICD-10-CM

## 2021-09-23 DIAGNOSIS — S81.801A WOUND OF RIGHT LOWER EXTREMITY, INITIAL ENCOUNTER: ICD-10-CM

## 2021-09-23 PROCEDURE — 99214 OFFICE O/P EST MOD 30 MIN: CPT | Performed by: ORTHOPAEDIC SURGERY

## 2021-09-23 NOTE — PROGRESS NOTES
"    [facility] – [place] [facility] Clinic Note        Subjective     CC: Follow-up (bilateral knee f/u; Left TKA 8/21/2019, Right TKA 2009)      HPI    Eli Clayton is a 81 y.o. female.  Patient here today for follow-up of her bilateral knee replacements.  Left form was done by me in August 2019.  Right side done by Dr. Valentin in 2009.  She is having little bit of anterior lateral knee pain on the right side over the last several months.  No history of any recent trauma she tells me.    Patient also tells me that she was lifting a case of drinks at the grocery store and felt a sudden pull in the anterior aspect of her shoulder.  She felt it along the biceps.    Patient also told me about 3 to 4 weeks ago, she hit her right leg on a car door and this is caused some pain and swelling and redness that is not healing.    Overall, patient's symptoms are as above.    ROS:    Constiutional:Pt denies fever, chills, nausea, or vomiting.  MSK:as above        Objective      Past Medical History  Past Medical History:   Diagnosis Date   • Anesthesia complication     decreased oxygen and heart rate after knee surgery    • Arthritis     osteoarthritis   • Cancer (CMS/HCC)     cervical and skin   • Disease of thyroid gland    • GERD (gastroesophageal reflux disease)    • Hard of hearing     has hearing aid but does not wear it    • History of transfusion    • Interstitial cystitis    • PONV (postoperative nausea and vomiting)    • Wears dentures    • Wears glasses          Physical Exam  /72   Pulse 69   Ht 165.1 cm (65\")   Wt 75.8 kg (167 lb 3.2 oz)   BMI 27.82 kg/m²     Body mass index is 27.82 kg/m².    Patient is well nourished and well developed.        Ortho Exam  Bilateral knees: Patient has range of motion of 0-1 20.  Incisions in the knee in general are benign appearing.  Calves are soft and nontender.  There is no ligamentous instability to varus or valgus at 0 and 30 degrees bilaterally.    Right leg: Patient " has a triangular-shaped scab with some surrounding skin erythema over the right leg.  There is no streaking noted.  There is no blanching noted.  No drainage noted.        Imaging/Labs/EMG Reviewed:  Imaging Results (Last 24 Hours)     Procedure Component Value Units Date/Time    XR Knee 3 View Bilateral [[id number]] Resulted: 09/23/21 1225     Updated: 09/23/21 1228    Narrative:      Knee X-ray    Indication: status-post TKA    Study:  AP, Lateral, and Sunrise views of Bilateral knee    Comparison: Right knee 2/9/2021, left knee 8/11/2020    Findings:  No signs of acute fracture are visualized  No signs of loosening are appreciated  Components are well aligned    Impression:  Status post Bilateral total knee arthroplasty. No signs of   loosening or fracture.              Assessment    Assessment:  1. Status post total bilateral knee replacement    2. Wound of right lower extremity, initial encounter    3. S/P complete repair of rotator cuff        Plan:  1. Recommend over the counter anti-inflammatories for pain and/or swelling  2. Status post bilateral total knee arthroplasty--patient's right knee is 12 years out and may be experiencing a little bit of wear overall.  I do not see any lysis or detect any instability.  We will watch this for now.  See her back in 12 months with an x-ray of both knees.  Left knee is functioning very well overall.  3. Wound right leg--referral to wound care will be made today.  4. Right shoulder injury now 7 months out from right rotator cuff repair--we have agreed to keep an eye on this for now.  She will call back if her pain persist or worsens.  An MRI can be ordered if that is the case.      Tristan Gonzalez MD  09/23/21  12:55 ALBERTOT      Dragon disclaimer:  Much of this encounter note is an electronic transcription/translation of spoken language to printed text. The electronic translation of spoken language may permit erroneous, or at times, nonsensical words or phrases to  be inadvertently transcribed; Although I have reviewed the note for such errors, some may still exist.

## 2021-09-30 ENCOUNTER — HOSPITAL ENCOUNTER (OUTPATIENT)
Dept: PHYSICAL THERAPY | Facility: HOSPITAL | Age: 81
Setting detail: THERAPIES SERIES
Discharge: HOME | End: 2021-09-30

## 2021-09-30 DIAGNOSIS — M79.604 PAIN IN RIGHT LEG: Primary | ICD-10-CM

## 2021-09-30 DIAGNOSIS — S81.801A WOUND OF RIGHT LOWER EXTREMITY, INITIAL ENCOUNTER: ICD-10-CM

## 2021-09-30 DIAGNOSIS — R60.0 BILATERAL LOWER EXTREMITY EDEMA: ICD-10-CM

## 2021-09-30 PROCEDURE — 97535 SELF CARE MNGMENT TRAINING: CPT | Performed by: PHYSICAL THERAPIST

## 2021-09-30 PROCEDURE — 97161 PT EVAL LOW COMPLEX 20 MIN: CPT | Performed by: PHYSICAL THERAPIST

## 2021-09-30 PROCEDURE — 97597 DBRDMT OPN WND 1ST 20 CM/<: CPT | Performed by: PHYSICAL THERAPIST

## 2021-10-04 ENCOUNTER — HOSPITAL ENCOUNTER (OUTPATIENT)
Dept: PHYSICAL THERAPY | Facility: HOSPITAL | Age: 81
Setting detail: THERAPIES SERIES
Discharge: HOME | End: 2021-10-04

## 2021-10-04 DIAGNOSIS — R60.0 BILATERAL LOWER EXTREMITY EDEMA: ICD-10-CM

## 2021-10-04 DIAGNOSIS — M79.604 PAIN IN RIGHT LEG: ICD-10-CM

## 2021-10-04 DIAGNOSIS — S81.801A WOUND OF RIGHT LOWER EXTREMITY, INITIAL ENCOUNTER: Primary | ICD-10-CM

## 2021-10-04 PROCEDURE — 29581 APPL MULTLAYER CMPRN SYS LEG: CPT

## 2021-10-04 PROCEDURE — 97597 DBRDMT OPN WND 1ST 20 CM/<: CPT

## 2021-10-04 NOTE — THERAPY WOUND CARE TREATMENT
Outpatient Rehabilitation - Wound/Debridement Treatment Note   Joaquina     Patient Name: Eli Clayton  : 1940  MRN: [id number]  Today's Date: 10/4/2021                  Admit Date: 10/4/2021    Visit Dx:    ICD-10-CM ICD-9-CM   1. Wound of right lower extremity, initial encounter  S81.801A 894.0   2. Pain in right leg  M79.604 729.5   3. Bilateral lower extremity edema  R60.0 782.3       Patient Active Problem List   Diagnosis   • Primary osteoarthritis of left knee   • Status post total left knee replacement   • Hypothyroid   • Leukocytosis, mild, likely reactive   • Acute blood loss anemia, mild, likely reactive   • Acute postoperative pain   • Traumatic incomplete tear of right rotator cuff   • Arthritis of right acromioclavicular joint        Past Medical History:   Diagnosis Date   • Anesthesia complication     decreased oxygen and heart rate after knee surgery    • Arthritis     osteoarthritis   • Cancer (CMS/HCC)     cervical and skin   • Disease of thyroid gland    • GERD (gastroesophageal reflux disease)    • Hard of hearing     has hearing aid but does not wear it    • History of transfusion    • Interstitial cystitis    • PONV (postoperative nausea and vomiting)    • Wears dentures    • Wears glasses         Past Surgical History:   Procedure Laterality Date   • COLONOSCOPY     • EYE SURGERY      cataracts removed    • HYSTERECTOMY     • KNEE SURGERY Right 2009    replacement    • SHOULDER ARTHROSCOPY W/ ROTATOR CUFF REPAIR Right 3/3/2021    Procedure: SHOULDER ARTHROSCOPY WITH ROTATOR CUFF REPAIR AND MAJOR DEBRIDEMENT - RIGHT;  Surgeon: Tristan Gonzalez MD;  Location:  [facility];  Service: Orthopedics;  Laterality: Right;   • SKIN BIOPSY     • TOTAL KNEE ARTHROPLASTY Left 2019    Procedure: TOTAL KNEE ARTHROPLASTY LEFT;  Surgeon: Tristan Gonzalez MD;  Location:  [facility];  Service: Orthopedics         EVALUATION  PT Ortho     Row Name 10/04/21 0800       Subjective  Comments    Subjective Comments  No issues or complaints. States she didn't replace compressogrip because she knew she was coming here.  -MP       Subjective Pain    Able to rate subjective pain?  yes  -MP    Pre-Treatment Pain Level  4  -MP    Post-Treatment Pain Level  4  -MP    Subjective Pain Comment  reports of burning after debridement  -MP       Sensation    Sensation WNL?  WFL  -MP       Transfers    Sit-Stand [place] (Transfers)  independent  -MP    Stand-Sit [place] (Transfers)  independent  -MP    Comment (Transfers)  seated for tx  -MP       Gait/Stairs (Locomotion)    [place] Level (Gait)  independent  -MP      User Key  (r) = Recorded By, (t) = Taken By, (c) = Cosigned By    Initials Name Provider Type    Gurjit Angel PT Physical Therapist          LDA Wound     Row Name 10/04/21 0800             Wound 09/30/21 0830 Right anterior leg Traumatic    Wound - Properties Group Placement Date: 09/30/21  -MW Placement Time: 0830  -MW Present on Hospital Admission: Y  -MW Side: Right  -MW Orientation: anterior  -MW Location: leg  -MW Primary Wound Type: Traumatic  -MW    Wound Image  Images linked: 1  -MP      Dressing Appearance  intact;moist drainage  -MP      Base  moist;yellow;slough;pink;red;granulating;subcutaneous biofilm buildup  -MP      Periwound  intact;dry;redness;swelling;warm very tender inferiorly  -MP      Periwound Temperature  warm  -MP      Periwound Skin Turgor  soft  -MP      Edges  irregular;open  -MP      Wound Length (cm)  1 cm  -MP      Wound Width (cm)  1.7 cm  -MP      Wound Depth (cm)  0.1 cm  -MP      Drainage Characteristics/Odor  serosanguineous  -MP      Drainage Amount  small  -MP      Care, Wound  cleansed with;wound cleanser;debrided  -MP      Dressing Care  dressing applied;collagen;antimicrobial agent applied;foam;border dressing;multi-layer wrap Muriel, HFBt, 4'' optifoam, MLW  -MP      Periwound Care  cleansed with pH balanced cleanser;dry periwound  area maintained  -MP      Retired Wound - Properties Group Date first assessed: 09/30/21  -MW Time first assessed: 0830  -MW Present on Hospital Admission: Y  -MW Side: Right  -MW Location: leg  -MW Primary Wound Type: Traumatic  -MW      User Key  (r) = Recorded By, (t) = Taken By, (c) = Cosigned By    Initials Name Provider Type    Marjorie Talavera, PT Physical Therapist    Gurjit Angel PT Physical Therapist        Lymphedema     Row Name 10/04/21 0800             Lymphedema Edema Assessment    Pitting Edema  Moderate  -MP         Lymphedema Pulses/Capillary Refill    Lymphedema Pulses/Capillary Refill  lower extremity pulses;capillary refill  -MP      Dorsalis Pedis Pulse  right:;+1 diminished  -MP      Posterior Tibialis Pulse  not tested  -MP      Capillary Refill  lower extremity capillary refill  -MP      Lower Extremity Capillary Refill  right:;less than 3 seconds  -MP         Compression/Skin Care    Compression/Skin Care  wrapping location;bandaging  -MP      Wrapping Location  lower extremity  -MP      Wrapping Location LE  right:;foot to knee  -MP      Wrapping Comments  wound dressings R LE, size 4 compressogrip doubled and overlapping on MH for gradient compression  -MP        User Key  (r) = Recorded By, (t) = Taken By, (c) = Cosigned By    Initials Name Provider Type    Gurjit Angel, FLOR Physical Therapist          WOUND DEBRIDEMENT  Total area of Debridement: 2 cm2  Debridement Site 1  Location- Site 1: RT shin  Selective Debridement- Site 1: Wound Surface <20cmsq  Instruments- Site 1: tweezers  Excised Tissue Description- Site 1: minimum, slough, other (comment) (biofilm)  Bleeding- Site 1: scant             Therapy Education     Row Name 10/04/21 0800             Therapy Education    Education Details  Explained rationale for addition of HFBt. Otherwise, continue current POC.  -MP      Given  Bandaging/dressing change;Pain management;Symptoms/condition management;Edema management   -MP      Program  Reinforced  -MP      How Provided  Verbal;Demonstration;Written  -MP      Provided to  Patient  -MP      Level of Understanding  Teach back education performed;Verbalized  -MP        User Key  (r) = Recorded By, (t) = Taken By, (c) = Cosigned By    Initials Name Provider Type    Gurjit Angel PT Physical Therapist          Recommendation and Plan  PT Assessment/Plan     Row Name 10/04/21 0800          PT Assessment    Functional Limitations  Performance in self-care ADL;Other (comment) wound care / edema management  -MP     Impairments  Integumentary integrity;Impaired venous circulation;Edema;Pain  -MP     Assessment Comments  R LE wound measurements were stable this date. PT able to debride minimal amount of slough and thick biofilm revealing pink/intact wound base. HFBt added to dressing management to address biofilm buildup. Patient would continue to benefit from skilled PT wound care to promote increased wound healing potential.  -MP     Rehab Potential  Good  -MP     Patient/caregiver participated in establishment of treatment plan and goals  Yes  -MP     Patient would benefit from skilled therapy intervention  Yes  -MP        PT Plan    PT Frequency  1x/week;2x/week;Other (comment) pending need for MIST  -MP     Physical Therapy Interventions (Optional Details)  patient/family education;wound care  -MP     PT Plan Comments  debridement, dressing management, ?MIST  -MP       User Key  (r) = Recorded By, (t) = Taken By, (c) = Cosigned By    Initials Name Provider Type    Gurjit Angel PT Physical Therapist          Goals  PT OP Goals     Row Name 10/04/21 0827          Time Calculation    PT Goal Re-Cert Due Date  12/30/21  -MP       User Key  (r) = Recorded By, (t) = Taken By, (c) = Cosigned By    Initials Name Provider Type    Gurjit Angel PT Physical Therapist          PT Goal Re-Cert Due Date: 12/30/21            Time Calculation: Start Time: 0800  Untimed Charges  Wound  Care: 40225 Multilayer comp below knee  27164-Ncvuosidkn comp below knee: 10  65248-Cicqjtuxm debridement: 12  Total Minutes  Untimed Charges Total Minutes: 22   Total Minutes: 22  Therapy Charges for Today     Code Description Service Date Service Provider Modifiers Qty    16145644941 HC PT MULTI LAYER COMP SYS BELOW KNEE 10/4/2021 Gurjit Martin, PT GP 1    [id number] HC MARISSA DEBRIDE OPEN WOUND UP TO 20CM 10/4/2021 Gurjit Martin, PT GP 1                  Gurjit Martin PT  10/4/2021

## 2021-10-11 ENCOUNTER — HOSPITAL ENCOUNTER (OUTPATIENT)
Dept: PHYSICAL THERAPY | Facility: HOSPITAL | Age: 81
Setting detail: THERAPIES SERIES
Discharge: HOME | End: 2021-10-11

## 2021-10-11 DIAGNOSIS — M79.604 PAIN IN RIGHT LEG: ICD-10-CM

## 2021-10-11 DIAGNOSIS — S81.801A WOUND OF RIGHT LOWER EXTREMITY, INITIAL ENCOUNTER: Primary | ICD-10-CM

## 2021-10-11 DIAGNOSIS — R60.0 BILATERAL LOWER EXTREMITY EDEMA: ICD-10-CM

## 2021-10-11 PROCEDURE — 97597 DBRDMT OPN WND 1ST 20 CM/<: CPT | Performed by: PHYSICAL THERAPIST

## 2021-10-11 NOTE — THERAPY WOUND CARE TREATMENT
Outpatient Rehabilitation - Wound/Debridement Treatment Note   Joaquina     Patient Name: Eli Clayton  : 1940  MRN: [id number]  Today's Date: 10/11/2021                 Admit Date: 10/11/2021    Visit Dx:    ICD-10-CM ICD-9-CM   1. Wound of right lower extremity, initial encounter  S81.801A 894.0   2. Pain in right leg  M79.604 729.5   3. Bilateral lower extremity edema  R60.0 782.3       Patient Active Problem List   Diagnosis   • Primary osteoarthritis of left knee   • Status post total left knee replacement   • Hypothyroid   • Leukocytosis, mild, likely reactive   • Acute blood loss anemia, mild, likely reactive   • Acute postoperative pain   • Traumatic incomplete tear of right rotator cuff   • Arthritis of right acromioclavicular joint        Past Medical History:   Diagnosis Date   • Anesthesia complication     decreased oxygen and heart rate after knee surgery    • Arthritis     osteoarthritis   • Cancer (CMS/HCC)     cervical and skin   • Disease of thyroid gland    • GERD (gastroesophageal reflux disease)    • Hard of hearing     has hearing aid but does not wear it    • History of transfusion    • Interstitial cystitis    • PONV (postoperative nausea and vomiting)    • Wears dentures    • Wears glasses         Past Surgical History:   Procedure Laterality Date   • COLONOSCOPY     • EYE SURGERY      cataracts removed    • HYSTERECTOMY     • KNEE SURGERY Right 2009    replacement    • SHOULDER ARTHROSCOPY W/ ROTATOR CUFF REPAIR Right 3/3/2021    Procedure: SHOULDER ARTHROSCOPY WITH ROTATOR CUFF REPAIR AND MAJOR DEBRIDEMENT - RIGHT;  Surgeon: Tristan Gonzalez MD;  Location:  [facility];  Service: Orthopedics;  Laterality: Right;   • SKIN BIOPSY     • TOTAL KNEE ARTHROPLASTY Left 2019    Procedure: TOTAL KNEE ARTHROPLASTY LEFT;  Surgeon: Tristan Gonzalez MD;  Location:  [facility];  Service: Orthopedics         EVALUATION   PT Ortho     Row Name 10/11/21 0810       Subjective  "Comments    Subjective Comments Pt apologized for being late; got stuck behind an accident.  -MW       Subjective Pain    Able to rate subjective pain? yes  -MW    Pre-Treatment Pain Level 2  -MW    Post-Treatment Pain Level 1  -MW    Subjective Pain Comment \"feels better with the moisture\"  -MW       Transfers    Sit-Stand [place] (Transfers) independent  -MW    Stand-Sit [place] (Transfers) independent  -MW    Comment (Transfers) seated for tx  -MW       Gait/Stairs (Locomotion)    [place] Level (Gait) independent  -MW          User Key  (r) = Recorded By, (t) = Taken By, (c) = Cosigned By    Initials Name Provider Type    MW aMrjorie Merrill, PT Physical Therapist                 LDA Wound     Row Name 10/11/21 0810             Wound 09/30/21 0830 Right anterior leg Traumatic    Wound - Properties Group Placement Date: 09/30/21  -MW Placement Time: 0830 -MW Present on Hospital Admission: Y  -MW Side: Right  -MW Orientation: anterior  -MW Location: leg  -MW Primary Wound Type: Traumatic  -MW      Dressing Appearance intact; dry; dried drainage  -MW      Base yellow; slough; pink; red; granulating; subcutaneous; epithelialization  biofilm buildup  -MW      Periwound intact; dry; swelling; warm  very tender inferiorly  -MW      Periwound Temperature warm  -MW      Periwound Skin Turgor soft  -MW      Edges irregular; open  -MW      Drainage Characteristics/Odor serosanguineous  -MW      Drainage Amount scant  saline to loosen adherent HFB  -MW      Care, Wound cleansed with; wound cleanser; debrided  -MW      Dressing Care dressing changed; silver impregnated; collagen; low-adherent; foam; border dressing  Muriel damp w/ saline, 4\" optifoam  -MW      Periwound Care cleansed with pH balanced cleanser; dry periwound area maintained  -MW      Retired Wound - Properties Group Date first assessed: 09/30/21  -MW Time first assessed: 0830  -MW Present on Hospital Admission: Y  -MW Side: Right  -MW " Location: leg  -MW Primary Wound Type: Traumatic  -MW            User Key  (r) = Recorded By, (t) = Taken By, (c) = Cosigned By    Initials Name Provider Type    Marjorie Talavera, PT Physical Therapist                  WOUND DEBRIDEMENT  Total area of Debridement: ~ 2cm2  Debridement Site 1  Location- Site 1: RT shin  Selective Debridement- Site 1: Wound Surface <20cmsq  Instruments- Site 1: tweezers  Excised Tissue Description- Site 1: minimum, slough, other (comment) (dry crusts)  Bleeding- Site 1: none              Therapy Education     Row Name 10/11/21 0810             Therapy Education    Education Details Explained benefit of adding slight amount of moisture to Muriel, discontinue HFB. Cont change q 2-3 days.  -MW      Given Bandaging/dressing change; Pain management; Symptoms/condition management; Edema management  -MW      Program Progressed  -MW      How Provided Verbal; Demonstration  -MW      Provided to Patient  -MW      Level of Understanding Teach back education performed; Verbalized  -MW            User Key  (r) = Recorded By, (t) = Taken By, (c) = Cosigned By    Initials Name Provider Type    Marjorie Talavera, PT Physical Therapist                Recommendation and Plan   PT Assessment/Plan     Row Name 10/11/21 0810          PT Assessment    Functional Limitations Performance in self-care ADL; Other (comment)  wound care / edema management  -MW     Impairments Integumentary integrity; Impaired venous circulation; Edema; Pain  -MW     Assessment Comments Wound continues to improve with new epithelial growth along edges. Wound too dry with adherent HFB and dry crusts. Adding saline to Muriel and holding HFB this week. Pt to add saline with home dressing too. Cont PT POC with moist wound environment and debridement prn.  -MW     Rehab Potential Good  -MW     Patient/caregiver participated in establishment of treatment plan and goals Yes  -MW     Patient would benefit from skilled therapy  intervention Yes  -MW            PT Plan    PT Frequency 1x/week  -MW     Physical Therapy Interventions (Optional Details) wound care; patient/family education  -MW     PT Plan Comments debridement, dressing management, ?MIST  -MW           User Key  (r) = Recorded By, (t) = Taken By, (c) = Cosigned By    Initials Name Provider Type    Marjorie Talavera, PT Physical Therapist                Goals                   Time Calculation: Start Time: 0810  Untimed Charges  57361-[id number] debridement: 12  Total Minutes  Untimed Charges Total Minutes: 12   Total Minutes: 12  Therapy Charges for Today     Code Description Service Date Service Provider Modifiers Qty    [id number]  MARISSA DEBRIDE OPEN WOUND UP TO 20CM 10/11/2021 Marjorie Merrill, PT GP 1                  Marjorie Merrill, PT  10/11/2021

## 2021-10-18 ENCOUNTER — HOSPITAL ENCOUNTER (OUTPATIENT)
Dept: PHYSICAL THERAPY | Facility: HOSPITAL | Age: 81
Setting detail: THERAPIES SERIES
Discharge: HOME | End: 2021-10-18

## 2021-10-18 DIAGNOSIS — R60.0 BILATERAL LOWER EXTREMITY EDEMA: ICD-10-CM

## 2021-10-18 DIAGNOSIS — S81.801A WOUND OF RIGHT LOWER EXTREMITY, INITIAL ENCOUNTER: Primary | ICD-10-CM

## 2021-10-18 DIAGNOSIS — M79.604 PAIN IN RIGHT LEG: ICD-10-CM

## 2021-10-18 PROCEDURE — 97597 DBRDMT OPN WND 1ST 20 CM/<: CPT | Performed by: PHYSICAL THERAPIST

## 2021-10-18 NOTE — THERAPY WOUND CARE TREATMENT
Outpatient Rehabilitation - Wound/Debridement Treatment Note   Joaquina     Patient Name: Eli Clayton  : 1940  MRN: [id number]  Today's Date: 10/18/2021                 Admit Date: 10/18/2021    Visit Dx:    ICD-10-CM ICD-9-CM   1. Wound of right lower extremity, initial encounter  S81.801A 894.0   2. Pain in right leg  M79.604 729.5   3. Bilateral lower extremity edema  R60.0 782.3       Patient Active Problem List   Diagnosis   • Primary osteoarthritis of left knee   • Status post total left knee replacement   • Hypothyroid   • Leukocytosis, mild, likely reactive   • Acute blood loss anemia, mild, likely reactive   • Acute postoperative pain   • Traumatic incomplete tear of right rotator cuff   • Arthritis of right acromioclavicular joint        Past Medical History:   Diagnosis Date   • Anesthesia complication     decreased oxygen and heart rate after knee surgery    • Arthritis     osteoarthritis   • Cancer (CMS/HCC)     cervical and skin   • Disease of thyroid gland    • GERD (gastroesophageal reflux disease)    • Hard of hearing     has hearing aid but does not wear it    • History of transfusion    • Interstitial cystitis    • PONV (postoperative nausea and vomiting)    • Wears dentures    • Wears glasses         Past Surgical History:   Procedure Laterality Date   • COLONOSCOPY     • EYE SURGERY      cataracts removed    • HYSTERECTOMY     • KNEE SURGERY Right 2009    replacement    • SHOULDER ARTHROSCOPY W/ ROTATOR CUFF REPAIR Right 3/3/2021    Procedure: SHOULDER ARTHROSCOPY WITH ROTATOR CUFF REPAIR AND MAJOR DEBRIDEMENT - RIGHT;  Surgeon: Tristan Gonzalez MD;  Location:  [facility];  Service: Orthopedics;  Laterality: Right;   • SKIN BIOPSY     • TOTAL KNEE ARTHROPLASTY Left 2019    Procedure: TOTAL KNEE ARTHROPLASTY LEFT;  Surgeon: Tristan Gonzalez MD;  Location:  [facility];  Service: Orthopedics         EVALUATION   PT Ortho     Row Name 10/18/21 0800       Subjective  "Comments    Subjective Comments Feels better with new dressing in place. Got a little crusty looking again.  -MW       Subjective Pain    Able to rate subjective pain? yes  -MW    Pre-Treatment Pain Level 0  -MW    Post-Treatment Pain Level 1  -MW       Transfers    Sit-Stand [place] (Transfers) independent  -MW    Stand-Sit [place] (Transfers) independent  -MW    Comment (Transfers) seated for tx  -MW       Gait/Stairs (Locomotion)    [place] Level (Gait) independent  -MW          User Key  (r) = Recorded By, (t) = Taken By, (c) = Cosigned By    Initials Name Provider Type    MW Marjorie Merrill, PT Physical Therapist                 LDA Wound     Row Name 10/18/21 0800             Wound 09/30/21 0830 Right anterior leg Traumatic    Wound - Properties Group Placement Date: 09/30/21  -MW Placement Time: 0830  -MW Present on Hospital Admission: Y  -MW Side: Right  -MW Orientation: anterior  -MW Location: leg  -MW Primary Wound Type: Traumatic  -MW      Wound Image  View All Images View Images  -MW      Dressing Appearance intact; dried drainage  -MW      Base yellow; slough; pink; red; granulating; epithelialization  -MW      Periwound intact; dry; swelling; warm  -MW      Periwound Temperature warm  -MW      Periwound Skin Turgor soft  -MW      Edges irregular; open  -MW      Wound Length (cm) 0.6 cm  -MW      Wound Width (cm) 0.4 cm  -MW      Wound Depth (cm) 0.1 cm  -MW      Drainage Characteristics/Odor serosanguineous  -MW      Drainage Amount scant  -MW      Care, Wound cleansed with; wound cleanser; debrided  -MW      Dressing Care dressing applied; silver impregnated; collagen; low-adherent; foam; border dressing  Saline damp Muriel, 4\" Optifoam.  -MW      Periwound Care cleansed with pH balanced cleanser; dry periwound area maintained  -MW      Retired Wound - Properties Group Date first assessed: 09/30/21  -MW Time first assessed: 0830  -MW Present on Hospital Admission: Y  -MW Side: Right  " -MW Location: leg  -MW Primary Wound Type: Traumatic  -MW            User Key  (r) = Recorded By, (t) = Taken By, (c) = Cosigned By    Initials Name Provider Type    Marjorie Talavera, PT Physical Therapist                  WOUND DEBRIDEMENT  Total area of Debridement: ~ 2cm2  Debridement Site 1  Location- Site 1: RT shin  Selective Debridement- Site 1: Wound Surface <20cmsq  Instruments- Site 1: tweezers  Excised Tissue Description- Site 1: moderate, other (comment), scant, slough (mod crusting)  Bleeding- Site 1: none              Therapy Education     Row Name 10/18/21 0800             Therapy Education    Education Details Cont saline moist jag, compression socks.  -MW      Given Bandaging/dressing change; Pain management; Symptoms/condition management; Edema management  -MW      Program Progressed  -MW      How Provided Verbal; Demonstration  -MW      Provided to Patient  -MW      Level of Understanding Teach back education performed; Verbalized  -MW            User Key  (r) = Recorded By, (t) = Taken By, (c) = Cosigned By    Initials Name Provider Type    Marjorie Talavera, PT Physical Therapist                Recommendation and Plan   PT Assessment/Plan     Row Name 10/18/21 0800          PT Assessment    Functional Limitations Performance in self-care ADL; Other (comment)  wound care / edema management  -MW     Impairments Integumentary integrity; Impaired venous circulation; Edema; Pain  -MW     Assessment Comments Wound dimensions decreasing with new epithelial growth along edges. Scant slough and mod crusts debrided. cont POC.  -MW     Rehab Potential Good  -MW     Patient/caregiver participated in establishment of treatment plan and goals Yes  -MW     Patient would benefit from skilled therapy intervention Yes  -MW            PT Plan    PT Frequency 1x/week  -MW     Physical Therapy Interventions (Optional Details) wound care; patient/family education  -MW     PT Plan Comments debridement,  dressing management  -MW           User Key  (r) = Recorded By, (t) = Taken By, (c) = Cosigned By    Initials Name Provider Type    MW Marjorie Merrill, PT Physical Therapist                Goals                   Time Calculation: Start Time: 0800  Untimed Charges  89436-Rciylkncg debridement: 15  Total Minutes  Untimed Charges Total Minutes: 15   Total Minutes: 15  Therapy Charges for Today     Code Description Service Date Service Provider Modifiers Qty    [id number] HC MARISSA DEBRIDE OPEN WOUND UP TO 20CM 10/18/2021 Marjorie Merrill, PT GP 1                  Marjorie Merrill, PT  10/18/2021

## 2021-10-25 ENCOUNTER — APPOINTMENT (OUTPATIENT)
Dept: PHYSICAL THERAPY | Facility: HOSPITAL | Age: 81
End: 2021-10-25

## 2021-10-26 ENCOUNTER — OFFICE VISIT (OUTPATIENT)
Dept: ORTHOPEDIC SURGERY | Facility: CLINIC | Age: 81
End: 2021-10-26

## 2021-10-26 VITALS
WEIGHT: 176.8 LBS | SYSTOLIC BLOOD PRESSURE: 170 MMHG | HEART RATE: 86 BPM | DIASTOLIC BLOOD PRESSURE: 66 MMHG | HEIGHT: 65 IN | BODY MASS INDEX: 29.46 KG/M2

## 2021-10-26 DIAGNOSIS — S49.91XD INJURY OF RIGHT SHOULDER, SUBSEQUENT ENCOUNTER: ICD-10-CM

## 2021-10-26 DIAGNOSIS — Z98.890 S/P COMPLETE REPAIR OF ROTATOR CUFF: Primary | ICD-10-CM

## 2021-10-26 PROCEDURE — 99214 OFFICE O/P EST MOD 30 MIN: CPT | Performed by: ORTHOPAEDIC SURGERY

## 2021-10-26 NOTE — PROGRESS NOTES
[facility] [place] [facility] [place]        Subjective     CC: Follow-up (Right shoulder flare up x 1 month after picking up 24 pack of soft drinks, hx of Shoulder Arthroscopy With Rotator Cuff Repair And Major Debridement - Right 3/3/21)      KRISTOPHER Clayton is a 81 y.o. female who presents with new problem of: right shoulder pain.  Onset: lifting 24 pack of soft drinks. The issue has been ongoing for 4 week(s). Pain is a 10/10 on the pain scale. Pain is described as aching, throbbing and shooting. Associated symptoms include popping and giving way/buckling. The pain is worse with lying on affected side and any movement of the joint; resting improve the pain. Previous treatments have included: medications and arthroscopy 3/3/21.    I have reviewed the following portions of the patient's history:History of Present Illness and review of systems.    Patient returns for follow-up of her right shoulder.  In March 2021, and we repaired an upper subscap tear and a supraspinatus tear and performed a biceps tenotomy.  She was doing very well overall until about 1 to 2 months ago when she lifted a 24 pack of Pepsi and felt a pull in the front of the shoulder.  Since that time, she has had pain along the course of the biceps.  She is concerned that she has reinjured her shoulder.    ROS:    Constiutional:Pt denies fever, chills, nausea, or vomiting.  MSK:as above        Objective      Past Medical History  Past Medical History:   Diagnosis Date   • Anesthesia complication     decreased oxygen and heart rate after knee surgery    • Arthritis     osteoarthritis   • Cancer (HCC)     cervical and skin   • Disease of thyroid gland    • GERD (gastroesophageal reflux disease)    • Hard of hearing     has hearing aid but does not wear it    • History of transfusion    • Interstitial cystitis    • PONV (postoperative nausea and vomiting)    • Wears dentures    • Wears glasses          Physical Exam  /66   Pulse 86   Ht  "165.1 cm (65\")   Wt 80.2 kg (176 lb 12.8 oz)   BMI 29.42 kg/m²     Body mass index is 29.42 kg/m².    Patient is well nourished and well developed.        Ortho Exam  Musculoskeletal   Upper Extremity   Right Shoulder     Inspection and Palpation:     Medial border scapular tenderness-none    AC Joint Tenderness -none    Sensation is normal    Examination reveals no ecchymosis.        Strength and Tone:    Supraspinatus -4-5 with pain    External [place]-6-5 with pain    Infraspinatus - 5/5    Subscapularis -4+ out of 5 with pain    Deltoid - 5/5     Range of Motion      RightShoulder:    Internal Rotation: ROM - L4    External Rotation: AROM - 60 degrees    Elevation through flexion: AROM - 140 degrees        Impingement   Right shoulder    Lopez-Melecio impingement test positive    Neer impingement test negative     Functional Testing   Right shoulder    AC crossover adduction test negative    Speeds test negative    Uppercut test negative    O'Briens test negative    Drop arm sign negative    Apprehension relocation negative      Imaging/Labs/EMG Reviewed:  Imaging Results (Last 24 Hours)     ** No results found for the last 24 hours. **            Assessment    Assessment:  1. S/P complete repair of rotator cuff    2. Injury of right shoulder, subsequent encounter        Plan:  1. Recommend over the counter anti-inflammatories for pain and/or swelling  2. Status post right rotator cuff repair with subsequent reinjury--plan will be for repeat MRI to evaluate the condition of the repair construct.  See her back to review the study.      Tristan Gonzalez MD  10/26/21  10:32 EDT      Dragon disclaimer:  Much of this encounter note is an electronic transcription/translation of spoken language to printed text. The electronic translation of spoken language may permit erroneous, or at times, nonsensical words or phrases to be inadvertently transcribed; Although I have reviewed the note for such errors, some " may still exist.

## 2021-11-01 ENCOUNTER — HOSPITAL ENCOUNTER (OUTPATIENT)
Dept: PHYSICAL THERAPY | Facility: HOSPITAL | Age: 81
Setting detail: THERAPIES SERIES
Discharge: HOME | End: 2021-11-01

## 2021-11-01 DIAGNOSIS — S81.801A WOUND OF RIGHT LOWER EXTREMITY, INITIAL ENCOUNTER: Primary | ICD-10-CM

## 2021-11-01 DIAGNOSIS — M79.604 PAIN IN RIGHT LEG: ICD-10-CM

## 2021-11-01 PROCEDURE — 97597 DBRDMT OPN WND 1ST 20 CM/<: CPT

## 2021-11-01 NOTE — THERAPY PROGRESS REPORT/RE-CERT
Outpatient Rehabilitation - Wound/Debridement Treatment Note  EDEL Kunz     Patient Name: Eli Clayton  : 1940  MRN: [id number]  Today's Date: 2021                 Admit Date: 2021    Visit Dx:    ICD-10-CM ICD-9-CM   1. Wound of right lower extremity, initial encounter  S81.801A 894.0   2. Pain in right leg  M79.604 729.5       Patient Active Problem List   Diagnosis   • Primary osteoarthritis of left knee   • Status post total left knee replacement   • Hypothyroid   • Leukocytosis, mild, likely reactive   • Acute blood loss anemia, mild, likely reactive   • Acute postoperative pain   • Traumatic incomplete tear of right rotator cuff   • Arthritis of right acromioclavicular joint        Past Medical History:   Diagnosis Date   • Anesthesia complication     decreased oxygen and heart rate after knee surgery    • Arthritis     osteoarthritis   • Cancer (HCC)     cervical and skin   • Disease of thyroid gland    • GERD (gastroesophageal reflux disease)    • Hard of hearing     has hearing aid but does not wear it    • History of transfusion    • Interstitial cystitis    • PONV (postoperative nausea and vomiting)    • Wears dentures    • Wears glasses         Past Surgical History:   Procedure Laterality Date   • COLONOSCOPY     • EYE SURGERY      cataracts removed    • HYSTERECTOMY     • KNEE SURGERY Right 2009    replacement    • SHOULDER ARTHROSCOPY W/ ROTATOR CUFF REPAIR Right 3/3/2021    Procedure: SHOULDER ARTHROSCOPY WITH ROTATOR CUFF REPAIR AND MAJOR DEBRIDEMENT - RIGHT;  Surgeon: Tristan Gonzalez MD;  Location:  [facility];  Service: Orthopedics;  Laterality: Right;   • SKIN BIOPSY     • TOTAL KNEE ARTHROPLASTY Left 2019    Procedure: TOTAL KNEE ARTHROPLASTY LEFT;  Surgeon: Tristan Gonzalez MD;  [facility];  Service: Orthopedics         EVALUATION   PT Ortho     Row Name 21 0820       Subjective Comments    Subjective Comments The area is still  "slightly tender, but feels it is getting better.  -       Subjective Pain    Able to rate subjective pain? yes  -    Pre-Treatment Pain Level 1  -    Post-Treatment Pain Level 1  -       Transfers    Sit-Stand [place] (Transfers) independent  -    Stand-Sit [place] (Transfers) independent  -    Comment (Transfers) seated for tx  -       Gait/Stairs (Locomotion)    [place] Level (Gait) independent  -          User Key  (r) = Recorded By, (t) = Taken By, (c) = Cosigned By    Initials Name Provider Type    Juli Giron, PT Physical Therapist                 LDA Wound     Row Name 11/01/21 0820             Wound 09/30/21 0830 Right anterior leg Traumatic    Wound - Properties Group Placement Date: 09/30/21  -MW Placement Time: 0830  -MW Present on Hospital Admission: Y  -MW Side: Right  -MW Orientation: anterior  -MW Location: leg  -MW Primary Wound Type: Traumatic  -MW      Wound Image  View All Images View Images  -      Dressing Appearance intact; no drainage  -      Base epithelialization; pink  -      Periwound intact; dry; swelling; warm  -      Periwound Temperature warm  -      Periwound Skin Turgor soft  -      Drainage Amount none  -      Care, Wound cleansed with; wound cleanser; debrided  -      Dressing Care dressing applied; low-adherent; foam; border dressing  4\" optifoam  -      Periwound Care cleansed with pH balanced cleanser; dry periwound area maintained  -      Retired Wound - Properties Group Date first assessed: 09/30/21  -MW Time first assessed: 0830  -MW Present on Hospital Admission: Y  -MW Side: Right  -MW Location: leg  -MW Primary Wound Type: Traumatic  -MW            User Key  (r) = Recorded By, (t) = Taken By, (c) = Cosigned By    Initials Name Provider Type    Marjorie Talavera, PT Physical Therapist    Juli Giron, PT Physical Therapist                  WOUND DEBRIDEMENT  Total area of Debridement: 1 " cm2  Debridement Site 1  Location- Site 1: RT shin  Selective Debridement- Site 1: Wound Surface <20cmsq  Instruments- Site 1: tweezers  Excised Tissue Description- Site 1: minimum, other (comment) (crust, unabsorbed jag)  Bleeding- Site 1: none              Therapy Education     Row Name 11/01/21 0820             Therapy Education    Education Details Continue to cover the area with simple optifoams for one more week, then may leave [facility]. Tentative d/c.  -MC      Given Bandaging/dressing change; Pain management; Symptoms/condition management; Edema management  -      Program Progressed  -      How Provided Verbal; Demonstration  -MC      Provided to Patient  -      Level of Understanding Verbalized  -            User Key  (r) = Recorded By, (t) = Taken By, (c) = Cosigned By    Initials Name Provider Type    Juli Giron PT Physical Therapist                Recommendation and Plan   PT Assessment/Plan     Row Name 11/01/21 0820          PT Assessment    Functional Limitations Performance in self-care ADL; Other (comment)  wound mgmt  -     Impairments Integumentary integrity; Impaired venous circulation; Edema; Pain  -     Assessment Comments The R shin wound appears fully epithelialized today, with no openings and no drainage. Pt independent with gentle compression use. Pt has met all her PT wound care goals and is appropriate for tentative d/c.  -     Rehab Potential Good  -            PT Plan    Physical Therapy Interventions (Optional Details) wound care; patient/family education  -     PT Plan Comments tentative d/c  -           User Key  (r) = Recorded By, (t) = Taken By, (c) = Cosigned By    Initials Name Provider Type    Juli Giron PT Physical Therapist                Goals   PT OP Goals     Row Name 11/01/21 0820          PT Short Term Goals    STG Date to Achieve 10/28/21  -     STG 1 Pt/ caregiver independent with clean home dressing changes to promote wound  healing.   -     STG 1 Progress Met  -     STG 2 Pt/ caregiver able to verbalize s/s of infection and when to seek urgent care.   -     STG 2 Progress Met  -     STG 3 Rt shin wound to display >50% pink/reg wound bed to demonstrate wound healing.   -     STG 3 Progress Met  -     STG 4 Rt shin wound to decrease in dimensions at least 50% to demonstrate wound healing.   -     STG 4 Progress Met  -            Long Term Goals    LTG 1 Rt shin wound to display less then 10% nonviable tissue in wound bed to demonstrate wound healing.   -     LTG 1 Progress Met  -     LTG 2 Rt shin wound to decrease in dimensions at least 90% to demonstrate wound healing.   -     LTG 2 Progress Met  -     LTG 3 Pt independent with gentle lower extremity compression wear to support skin integrity and minimize edema.   -     LTG 3 Progress Met  -            Time Calculation    PT Goal Re-Cert Due Date 12/30/21  -           User Key  (r) = Recorded By, (t) = Taken By, (c) = Cosigned By    Initials Name Provider Type    Juli Giron, PT Physical Therapist                PT Goal Re-Cert Due Date: 12/30/21  PT Short Term Goals  STG Date to Achieve: 10/28/21  STG 1: Pt/ caregiver independent with clean home dressing changes to promote wound healing.   STG 1 Progress: Met  STG 2: Pt/ caregiver able to verbalize s/s of infection and when to seek urgent care.   STG 2 Progress: Met  STG 3: Rt shin wound to display >50% pink/reg wound bed to demonstrate wound healing.   STG 3 Progress: Met  STG 4: Rt shin wound to decrease in dimensions at least 50% to demonstrate wound healing.   STG 4 Progress: Met  Long Term Goals  LTG 1: Rt shin wound to display less then 10% nonviable tissue in wound bed to demonstrate wound healing.   LTG 1 Progress: Met  LTG 2: Rt shin wound to decrease in dimensions at least 90% to demonstrate wound healing.   LTG 2 Progress: Met  LTG 3: Pt independent with gentle lower extremity  compression wear to support skin integrity and minimize edema.   LTG 3 Progress: Met      Time Calculation: Start Time: 0820  Untimed Charges  78042-Zecrlfbtk debridement: 10  Total Minutes  Untimed Charges Total Minutes: 10   Total Minutes: 10  Therapy Charges for Today     Code Description Service Date Service Provider Modifiers Qty    [id number] HC MARISSA DEBRIDE OPEN WOUND UP TO 20CM 11/1/2021 Juli Li, PT GP 1                  Juli Li, PT  11/1/2021

## 2021-11-07 ENCOUNTER — HOSPITAL ENCOUNTER (OUTPATIENT)
Dept: MRI IMAGING | Facility: HOSPITAL | Age: 81
Discharge: HOME | End: 2021-11-07
Admitting: ORTHOPAEDIC SURGERY

## 2021-11-07 DIAGNOSIS — Z98.890 S/P COMPLETE REPAIR OF ROTATOR CUFF: ICD-10-CM

## 2021-11-07 DIAGNOSIS — S49.91XD INJURY OF RIGHT SHOULDER, SUBSEQUENT ENCOUNTER: ICD-10-CM

## 2021-11-07 PROCEDURE — 73221 MRI JOINT UPR EXTREM W/O DYE: CPT

## 2021-11-16 ENCOUNTER — OFFICE VISIT (OUTPATIENT)
Dept: ORTHOPEDIC SURGERY | Facility: CLINIC | Age: 81
End: 2021-11-16

## 2021-11-16 VITALS
HEART RATE: 76 BPM | BODY MASS INDEX: 29.46 KG/M2 | WEIGHT: 176.81 LBS | HEIGHT: 65 IN | DIASTOLIC BLOOD PRESSURE: 74 MMHG | SYSTOLIC BLOOD PRESSURE: 141 MMHG

## 2021-11-16 DIAGNOSIS — S46.011D TRAUMATIC COMPLETE TEAR OF RIGHT ROTATOR CUFF, SUBSEQUENT ENCOUNTER: ICD-10-CM

## 2021-11-16 DIAGNOSIS — Z98.890 S/P COMPLETE REPAIR OF ROTATOR CUFF: Primary | ICD-10-CM

## 2021-11-16 DIAGNOSIS — S49.91XD INJURY OF RIGHT SHOULDER, SUBSEQUENT ENCOUNTER: ICD-10-CM

## 2021-11-16 PROCEDURE — 99214 OFFICE O/P EST MOD 30 MIN: CPT | Performed by: ORTHOPAEDIC SURGERY

## 2021-11-16 RX ORDER — TRAMADOL HYDROCHLORIDE 50 MG/1
50 TABLET ORAL EVERY 6 HOURS PRN
Qty: 30 TABLET | Refills: 1 | Status: SHIPPED | OUTPATIENT
Start: 2021-11-16 | End: 2022-08-23

## 2021-11-16 NOTE — PROGRESS NOTES
"    [facility] [place] [facility] [place]        Subjective     CC: Follow-up (MRI (11/7/21) follow up; 8 months shoulder arthroscopy with rotator cuff repair and major debridement - Right 3/3/21)      KRISTOPHER Clayton is a 81 y.o. female.  Patient returns with her daughter today for follow-up after the MRI of her shoulder.    Overall, patient's symptoms are recurrent.  She has pain in the biceps muscle belly and difficulty with elevating the shoulder.    ROS:    Constiutional:Pt denies fever, chills, nausea, or vomiting.  MSK:as above        Objective      Past Medical History  Past Medical History:   Diagnosis Date   • Anesthesia complication     decreased oxygen and heart rate after knee surgery    • Arthritis     osteoarthritis   • Cancer (HCC)     cervical and skin   • Disease of thyroid gland    • GERD (gastroesophageal reflux disease)    • Hard of hearing     has hearing aid but does not wear it    • History of transfusion    • Interstitial cystitis    • PONV (postoperative nausea and vomiting)    • Wears dentures    • Wears glasses          Physical Exam  /74   Pulse 76   Ht 165.1 cm (65\")   Wt 80.2 kg (176 lb 12.9 oz)   BMI 29.42 kg/m²     Body mass index is 29.42 kg/m².    Patient is well nourished and well developed.        Ortho Exam  Forward elevation 130 with pain  4-5 supraspinatus with pain  No Tal sign noted    Imaging/Labs/EMG Reviewed:  Imaging Results (Last 24 Hours)     ** No results found for the last 24 hours. **        MRI Shoulder Right Without Contrast  Narrative: EXAMINATION: MRI SHOULDER, RIGHT, WO CONTRAST-11/07/2021:      INDICATION: Shoulder pain, rotator cuff tear/impingement suspected;  Z98.890-Other specified postprocedural states; S49.91XD-Unspecified  injury of right shoulder and upper arm, subsequent encounter.     TECHNIQUE: Sagittal T1 and STIR, coronal T1 and T2 fat-sat, axial T2  fat-sat images.     COMPARISON: 01/26/2021 right shoulder MRI.     FINDINGS:  The " patient history indicates previous right shoulder  arthroscopy, rotator cuff tear and major debridement March 3, 2021, now  with new right shoulder complaint after picking up a large pack of soft  drinks one month ago. Throbbing and shooting pain and popping, giving  way/buckling. Previous repair of subscapularis tear, supraspinatus tear  and biceps tenotomy.     Today's study shows expected changes of previous biceps repair. There is  a moderate joint and bursal effusion, and coronal images show indistinct  high- T2 signal appearance of the proximal and mid supraspinatus tendon.  The sagittal images show this as a well-defined, full-thickness tear  supraspinatus tear. Subscapularis, infraspinatus and teres minor tendons  appear to be intact.     There is AC joint DJD, and expected postop marrow signal changes, but no  evidence of significant marrow edema. Superior, posterior and inferior  portions of the labrum appear intact. Anterior labrum appears irregular  on multiple adjacent images, and axial T2 image 23 appears to show fluid  signal extending across the labral base. There is some fatty  change/atrophy of the supraspinatus muscle, a little greater than seen  on the prior study, and milder atrophic changes of the infraspinatus  which also appear increased.     Impression: 1. Interval rotator cuff repair and biceps tenotomy.     2. Large new full-thickness proximal and mid supraspinatus tear.     3. Abnormal appearing anterior labrum, whether degenerative change or  focal tear.     4. Mildly increased fatty change/atrophy of the supraspinatus and  infraspinatus muscles.     D:  11/09/2021  E:  11/09/2021           This report was finalized on 11/9/2021 9:18 PM by Dr. Corby Holly MD.     Reviewed images and report of the MRI above.  Patient has a new full-thickness midsubstance supraspinatus tear that does not appear as large as her prior tear.    Assessment    Assessment:  1. S/P complete repair of rotator cuff     2. Injury of right shoulder, subsequent encounter    3. Traumatic complete tear of right rotator cuff, subsequent encounter        Plan:  1. Recommend over the counter anti-inflammatories for pain and/or swelling  2. Traumatic retear right rotator cuff--likely represents a type II injury.  Its smaller than the initial injury.  Plan will be for an aggressive course of red yellow-green strengthening program and I will see her back in a couple months and see if she is getting better.  Overall, I believe her biceps tenotomy and biceps muscle spasm seems to be bothering her as much or more than the rotator cuff issue.      Tristan Gonzalez MD  11/16/21  13:03 EST      Dictated Utilizing Dragon Dictation.

## 2021-12-06 ENCOUNTER — DOCUMENTATION (OUTPATIENT)
Dept: PHYSICAL THERAPY | Facility: HOSPITAL | Age: 81
End: 2021-12-06

## 2021-12-06 DIAGNOSIS — S81.801A WOUND OF RIGHT LOWER EXTREMITY, INITIAL ENCOUNTER: Primary | ICD-10-CM

## 2021-12-06 NOTE — THERAPY DISCHARGE NOTE
Outpatient Rehabilitation - Wound/Debridement Discharge Summary       Patient Name: Eli Clayton  : 1940  MRN: [id number]  Today's Date: 2021                  Admit Date: (Not on file)    Visit Dx:    ICD-10-CM ICD-9-CM   1. Wound of right lower extremity, initial encounter  S81.801A 894.0       Patient Active Problem List   Diagnosis   • Primary osteoarthritis of left knee   • Status post total left knee replacement   • Hypothyroid   • Leukocytosis, mild, likely reactive   • Acute blood loss anemia, mild, likely reactive   • Acute postoperative pain   • Traumatic incomplete tear of right rotator cuff   • Arthritis of right acromioclavicular joint        Past Medical History:   Diagnosis Date   • Anesthesia complication     decreased oxygen and heart rate after knee surgery    • Arthritis     osteoarthritis   • Cancer (HCC)     cervical and skin   • Disease of thyroid gland    • GERD (gastroesophageal reflux disease)    • Hard of hearing     has hearing aid but does not wear it    • History of transfusion    • Interstitial cystitis    • PONV (postoperative nausea and vomiting)    • Wears dentures    • Wears glasses         Past Surgical History:   Procedure Laterality Date   • COLONOSCOPY     • EYE SURGERY      cataracts removed    • HYSTERECTOMY     • KNEE SURGERY Right 2009    replacement    • SHOULDER ARTHROSCOPY W/ ROTATOR CUFF REPAIR Right 3/3/2021    Procedure: SHOULDER ARTHROSCOPY WITH ROTATOR CUFF REPAIR AND MAJOR DEBRIDEMENT - RIGHT;  Surgeon: Tristan Gonzalez MD;  Location:  [facility];  Service: Orthopedics;  Laterality: Right;   • SKIN BIOPSY     • TOTAL KNEE ARTHROPLASTY Left 2019    Procedure: TOTAL KNEE ARTHROPLASTY LEFT;  Surgeon: Tristan Gonzalez MD;  Location: [facility] OR;  Service: Orthopedics         Goals   PT OP Goals     Row Name 21 1000          PT Short Term Goals    STG Date to Achieve 10/28/21  -     STG 1 Pt/ caregiver independent with clean home  dressing changes to promote wound healing.   -     STG 1 Progress Met  -     STG 2 Pt/ caregiver able to verbalize s/s of infection and when to seek urgent care.   -     STG 2 Progress Met  -     STG 3 Rt shin wound to display >50% pink/reg wound bed to demonstrate wound healing.   -     STG 3 Progress Met  -     STG 4 Rt shin wound to decrease in dimensions at least 50% to demonstrate wound healing.   -     STG 4 Progress Met  -            Long Term Goals    LTG 1 Rt shin wound to display less then 10% nonviable tissue in wound bed to demonstrate wound healing.   -     LTG 1 Progress Met  -     LTG 2 Rt shin wound to decrease in dimensions at least 90% to demonstrate wound healing.   -     LTG 2 Progress Met  -     LTG 3 Pt independent with gentle lower extremity compression wear to support skin integrity and minimize edema.   -     LTG 3 Progress Met  -           User Key  (r) = Recorded By, (t) = Taken By, (c) = Cosigned By    Initials Name Provider Type    Shiolh Melgar, PT Physical Therapist                 OP Discharge Summary     Row Name 12/06/21 1009             OP PT Discharge Summary    Date of Discharge 12/06/21  -      Reason for Discharge All goals achieved; Independent  -      Outcomes Achieved Able to achieve all goals within established timeline  -      Discharge Destination Home without follow-up  -            User Key  (r) = Recorded By, (t) = Taken By, (c) = Cosigned By    Initials Name Provider Type    Shiloh Melgar, PT Physical Therapist                Shiloh Cagle, PT  12/6/2021

## 2022-01-18 ENCOUNTER — OFFICE VISIT (OUTPATIENT)
Dept: ORTHOPEDIC SURGERY | Facility: CLINIC | Age: 82
End: 2022-01-18

## 2022-01-18 VITALS
DIASTOLIC BLOOD PRESSURE: 84 MMHG | HEIGHT: 65 IN | SYSTOLIC BLOOD PRESSURE: 128 MMHG | BODY MASS INDEX: 28.32 KG/M2 | WEIGHT: 170 LBS

## 2022-01-18 DIAGNOSIS — S46.011D TRAUMATIC COMPLETE TEAR OF RIGHT ROTATOR CUFF, SUBSEQUENT ENCOUNTER: ICD-10-CM

## 2022-01-18 DIAGNOSIS — Z98.890 S/P COMPLETE REPAIR OF ROTATOR CUFF: Primary | ICD-10-CM

## 2022-01-18 DIAGNOSIS — S49.91XD INJURY OF RIGHT SHOULDER, SUBSEQUENT ENCOUNTER: ICD-10-CM

## 2022-01-18 PROCEDURE — 20610 DRAIN/INJ JOINT/BURSA W/O US: CPT | Performed by: ORTHOPAEDIC SURGERY

## 2022-01-18 PROCEDURE — 99213 OFFICE O/P EST LOW 20 MIN: CPT | Performed by: ORTHOPAEDIC SURGERY

## 2022-01-18 RX ORDER — TRIAMCINOLONE ACETONIDE 40 MG/ML
40 INJECTION, SUSPENSION INTRA-ARTICULAR; INTRAMUSCULAR
Status: COMPLETED | OUTPATIENT
Start: 2022-01-18 | End: 2022-01-18

## 2022-01-18 RX ORDER — LIDOCAINE HYDROCHLORIDE 10 MG/ML
3 INJECTION, SOLUTION EPIDURAL; INFILTRATION; INTRACAUDAL; PERINEURAL
Status: COMPLETED | OUTPATIENT
Start: 2022-01-18 | End: 2022-01-18

## 2022-01-18 RX ADMIN — LIDOCAINE HYDROCHLORIDE 3 ML: 10 INJECTION, SOLUTION EPIDURAL; INFILTRATION; INTRACAUDAL; PERINEURAL at 11:31

## 2022-01-18 RX ADMIN — TRIAMCINOLONE ACETONIDE 40 MG: 40 INJECTION, SUSPENSION INTRA-ARTICULAR; INTRAMUSCULAR at 11:31

## 2022-01-18 NOTE — PROGRESS NOTES
Procedure   Large Joint Arthrocentesis: R subacromial bursa  Date/Time: 1/18/2022 11:31 AM  Consent given by: patient  Site marked: site marked  Timeout: Immediately prior to procedure a time out was called to verify the correct patient, procedure, equipment, support staff and site/side marked as required   Supporting Documentation  Indications: pain   Procedure Details  Location: shoulder - R subacromial bursa  Preparation: Patient was prepped and draped in the usual sterile fashion  Needle size: 25 G  Approach: posterior  Medications administered: 40 mg triamcinolone acetonide 40 MG/ML; 3 mL lidocaine PF 1% 1 %  Patient tolerance: patient tolerated the procedure well with no immediate complications

## 2022-01-18 NOTE — PROGRESS NOTES
"    [facility] [place] [facility] Note        Subjective     CC: Follow-up (2 month follow up; 10.5 months shoulder arthroscopy with rotator cuff repair and major debridement - Right 3/3/21)      KRISTOPHER Clayton is a 81 y.o. female.  Patient is here with her daughter today.  She is here to follow-up on her read tear of her right rotator cuff.  Patient also had biceps tenotomy at the time of surgery.  She is complaining of bicipital muscle belly pain again today.  She says her shoulder feels stronger but still hurts.  She has been doing home exercises.    Overall, patient's symptoms are as above.    ROS:    Constiutional:Pt denies fever, chills, nausea, or vomiting.  MSK:as above        Objective      Past Medical History  Past Medical History:   Diagnosis Date   • Anesthesia complication     decreased oxygen and heart rate after knee surgery    • Arthritis     osteoarthritis   • Cancer (HCC)     cervical and skin   • Disease of thyroid gland    • GERD (gastroesophageal reflux disease)    • Hard of hearing     has hearing aid but does not wear it    • History of transfusion    • Interstitial cystitis    • PONV (postoperative nausea and vomiting)    • Wears dentures    • Wears glasses          Physical Exam  /84   Ht 165.1 cm (65\")   Wt 77.1 kg (170 lb)   BMI 28.29 kg/m²     Body mass index is 28.29 kg/m².    Patient is well nourished and well developed.        Ortho Exam  Musculoskeletal   Upper Extremity   Right Shoulder       Strength and Tone:    Supraspinatus -4-5 with pain    External Rotators-5/5    Infraspinatus - 5/5    Subscapularis - 5/5    Deltoid - 5/5     Range of Motion      Right Shoulder:    Internal Rotation: ROM - L4    External Rotation: AROM - 70 degrees    Elevation through flexion: AROM - 140 degrees     AC joint:  non tender to palpation    AC joint:  negative crossover      Imaging/Labs/EMG Reviewed:  Imaging Results (Last 24 Hours)     ** No results found for the last 24 " hours. **            Assessment    Assessment:  1. S/P complete repair of rotator cuff    2. Injury of right shoulder, subsequent encounter    3. Traumatic complete tear of right rotator cuff, subsequent encounter        Plan:  1. Recommend over the counter anti-inflammatories for pain and/or swelling  2. Retear right rotator cuff status post repair with biceps muscle belly pain after tenotomy--plan will be for subacromial junction at her request today.  Follow-up in 3 to 4 months we will see how she is doing overall.  I told her that I do not believe that revision of cuff repair is a great option for her.  A better option long-term would be reverse TSA.  We will see how she does with the injection.    Procedure Note:    I discussed with the patient the potential benefits of performing a therapeutic injections as well as potential risks including but not limited to infection, swelling, pain, bleeding, bruising, nerve/vessel damage, skin color changes, transient elevation in blood glucose levels, and fat atrophy. After informed consent and after the areas were prepped with chlorhexadine soap, ethyl chloride was used to numb the skin. Via the posterolateral approach, 3mL of 1% lidocaine followed by 40mg of Kenalog were each injected into the subacromial space of the right shoulder. The patient tolerated the procedure well. There were no complications. A sterile dressing was placed over the injection sites.        Tristan Gonzalez MD  01/18/22  13:00 EST      Dictated Utilizing Dragon Dictation.

## 2022-04-19 ENCOUNTER — OFFICE VISIT (OUTPATIENT)
Dept: ORTHOPEDIC SURGERY | Facility: CLINIC | Age: 82
End: 2022-04-19

## 2022-04-19 VITALS
WEIGHT: 169 LBS | DIASTOLIC BLOOD PRESSURE: 82 MMHG | HEIGHT: 65 IN | BODY MASS INDEX: 28.16 KG/M2 | SYSTOLIC BLOOD PRESSURE: 130 MMHG

## 2022-04-19 DIAGNOSIS — Z98.890 S/P COMPLETE REPAIR OF ROTATOR CUFF: Primary | ICD-10-CM

## 2022-04-19 DIAGNOSIS — S46.011D TRAUMATIC COMPLETE TEAR OF RIGHT ROTATOR CUFF, SUBSEQUENT ENCOUNTER: ICD-10-CM

## 2022-04-19 DIAGNOSIS — S49.91XD INJURY OF RIGHT SHOULDER, SUBSEQUENT ENCOUNTER: ICD-10-CM

## 2022-04-19 PROCEDURE — 20610 DRAIN/INJ JOINT/BURSA W/O US: CPT | Performed by: ORTHOPAEDIC SURGERY

## 2022-04-19 RX ORDER — LIDOCAINE HYDROCHLORIDE 10 MG/ML
3 INJECTION, SOLUTION EPIDURAL; INFILTRATION; INTRACAUDAL; PERINEURAL
Status: COMPLETED | OUTPATIENT
Start: 2022-04-19 | End: 2022-04-19

## 2022-04-19 RX ORDER — TRIAMCINOLONE ACETONIDE 40 MG/ML
40 INJECTION, SUSPENSION INTRA-ARTICULAR; INTRAMUSCULAR
Status: COMPLETED | OUTPATIENT
Start: 2022-04-19 | End: 2022-04-19

## 2022-04-19 RX ADMIN — LIDOCAINE HYDROCHLORIDE 3 ML: 10 INJECTION, SOLUTION EPIDURAL; INFILTRATION; INTRACAUDAL; PERINEURAL at 10:43

## 2022-04-19 RX ADMIN — TRIAMCINOLONE ACETONIDE 40 MG: 40 INJECTION, SUSPENSION INTRA-ARTICULAR; INTRAMUSCULAR at 10:43

## 2022-04-19 NOTE — PROGRESS NOTES
[facility] [place] [facility]opa[facility] Surgery Clinic Note        Subjective     CC: Follow-up (3 month follow up - 13 months s/p shoulder arthroscopy with rotator cuff repair and major debridement - Right 3/3/21)      KRISTOPHER Clayton is a 81 y.o. female.  Patient returns the office today with her daughter.  At her last visit about 3 months ago, she was diagnosed with a recurrent tear of her rotator cuff status post repair.  Patient has also had a biceps tenotomy.  She says the injection helped her for about 6 to 8 weeks.  She is here requesting another injection.  Patient has not been very good recently by doing her deltoid exercises.    Overall, patient's symptoms are as above.    ROS:    Constiutional:Pt denies fever, chills, nausea, or vomiting.  MSK:as above        Objective      Past Medical History  Past Medical History:   Diagnosis Date   • Anesthesia complication     decreased oxygen and heart rate after knee surgery    • Arthritis     osteoarthritis   • Cancer (HCC)     cervical and skin   • Disease of thyroid gland    • GERD (gastroesophageal reflux disease)    • Hard of hearing     has hearing aid but does not wear it    • History of transfusion    • Interstitial cystitis    • PONV (postoperative nausea and vomiting)    • Wears dentures    • Wears glasses        Assessment    Assessment:  1. S/P complete repair of rotator cuff    2. Injury of right shoulder, subsequent encounter    3. Traumatic complete tear of right rotator cuff, subsequent encounter        Plan:  1. Recommend over the counter anti-inflammatories for pain and/or swelling  2. Retear right rotator cuff --patient symptoms have gotten better after subacromial injection.  Repeat the injection today.  Follow-up in 4 months.  Encouraged her to restart her deltoid strengthening exercises.    Procedure Note:    I discussed with the patient the potential benefits of performing a therapeutic injections as well as potential risks including but not limited to  infection, swelling, pain, bleeding, bruising, nerve/vessel damage, skin color changes, transient elevation in blood glucose levels, and fat atrophy. After informed consent and after the areas were prepped with chlorhexadine soap, ethyl chloride was used to numb the skin. Via the posterolateral approach, 3mL of 1% lidocaine followed by 40mg of Kenalog were each injected into the subacromial space of the right shoulder. The patient tolerated the procedure well. There were no complications. A sterile dressing was placed over the injection sites.        Tristan Gonzalez MD  04/19/22  13:09 EDT      Dictated Utilizing Dragon Dictation.

## 2022-04-19 NOTE — PROGRESS NOTES
Procedure   Large Joint Arthrocentesis: R subacromial bursa  Date/Time: 4/19/2022 10:43 AM  Consent given by: patient  Site marked: site marked  Timeout: Immediately prior to procedure a time out was called to verify the correct patient, procedure, equipment, support staff and site/side marked as required   Supporting Documentation  Indications: pain   Procedure Details  Location: shoulder - R subacromial bursa  Preparation: Patient was prepped and draped in the usual sterile fashion  Needle size: 25 G  Approach: posterior  Medications administered: 3 mL lidocaine PF 1% 1 %; 40 mg triamcinolone acetonide 40 MG/ML  Patient tolerance: patient tolerated the procedure well with no immediate complications

## 2022-08-23 ENCOUNTER — OFFICE VISIT (OUTPATIENT)
Dept: ORTHOPEDIC SURGERY | Facility: CLINIC | Age: 82
End: 2022-08-23

## 2022-08-23 VITALS
SYSTOLIC BLOOD PRESSURE: 132 MMHG | DIASTOLIC BLOOD PRESSURE: 74 MMHG | HEIGHT: 65 IN | WEIGHT: 161.8 LBS | BODY MASS INDEX: 26.96 KG/M2

## 2022-08-23 DIAGNOSIS — Z98.890 S/P COMPLETE REPAIR OF ROTATOR CUFF: Primary | ICD-10-CM

## 2022-08-23 DIAGNOSIS — S46.011D TRAUMATIC COMPLETE TEAR OF RIGHT ROTATOR CUFF, SUBSEQUENT ENCOUNTER: ICD-10-CM

## 2022-08-23 DIAGNOSIS — S49.91XD INJURY OF RIGHT SHOULDER, SUBSEQUENT ENCOUNTER: ICD-10-CM

## 2022-08-23 PROCEDURE — 99212 OFFICE O/P EST SF 10 MIN: CPT | Performed by: ORTHOPAEDIC SURGERY

## 2022-08-23 RX ORDER — PANTOPRAZOLE SODIUM 40 MG/1
TABLET, DELAYED RELEASE ORAL
COMMUNITY
Start: 2022-06-16

## 2022-08-23 RX ORDER — TRIAMTERENE AND HYDROCHLOROTHIAZIDE 37.5; 25 MG/1; MG/1
TABLET ORAL
COMMUNITY
Start: 2022-07-12

## 2022-08-23 RX ORDER — ALBUTEROL SULFATE 90 UG/1
AEROSOL, METERED RESPIRATORY (INHALATION)
COMMUNITY

## 2022-08-23 RX ORDER — APIXABAN 5 MG/1
TABLET, FILM COATED ORAL
COMMUNITY
Start: 2022-07-22

## 2022-08-23 RX ORDER — TRIAMCINOLONE ACETONIDE 1 MG/G
CREAM TOPICAL
COMMUNITY

## 2022-08-23 RX ORDER — AMOXICILLIN 250 MG
CAPSULE ORAL EVERY 12 HOURS SCHEDULED
COMMUNITY

## 2022-08-23 RX ORDER — LEVOTHYROXINE SODIUM 88 UG/1
TABLET ORAL
COMMUNITY
Start: 2022-06-03

## 2022-08-23 NOTE — PROGRESS NOTES
"    [facility] [place] [facility] Surgery Clinic Note        Subjective     CC: Follow-up (4 month follow up Right Shoulder Pain-Bursa Injection 4/19/22)      HPI    Eli Clayton is a 82 y.o. female.  Patient is here today for follow-up of her right shoulder.  Her daughter is with her.  She is scheduled to undergo cholecystectomy within a week.  She is on Eliquis for DVT.  This is a subacromial junction given to her on 4/19/2022 helped her quite a bit.  She is now having pain again.    Overall, patient's symptoms are as above.    ROS:    Constiutional:Pt denies fever, chills, nausea, or vomiting.  MSK:as above        Objective      Past Medical History  Past Medical History:   Diagnosis Date   • Anesthesia complication     decreased oxygen and heart rate after knee surgery    • Arthritis     osteoarthritis   • Cancer (HCC)     cervical and skin   • Disease of thyroid gland    • GERD (gastroesophageal reflux disease)    • Hard of hearing     has hearing aid but does not wear it    • History of transfusion    • Interstitial cystitis    • PONV (postoperative nausea and vomiting)    • Wears dentures    • Wears glasses          Physical Exam  /74   Ht 165.1 cm (65\")   Wt 73.4 kg (161 lb 12.8 oz)   BMI 26.92 kg/m²     Body mass index is 26.92 kg/m².    Patient is well nourished and well developed.        Ortho Exam  Musculoskeletal   Upper Extremity   Right Shoulder       Strength and Tone:    Supraspinatus -4-5 with pain    External Rotators-5/5    Infraspinatus - 5/5    Subscapularis - 5/5    Deltoid - 5/5     Range of Motion      Right Shoulder:    Internal Rotation: ROM - L4    External Rotation: AROM - 70 degrees    Elevation through flexion: AROM - 140 degrees     AC joint:  non tender to palpation    AC joint:  negative crossover      Imaging/Labs/EMG Reviewed:  Imaging Results (Last 24 Hours)     ** No results found for the last 24 hours. **            Assessment    Assessment:  1. S/P complete repair of rotator cuff "    2. Injury of right shoulder, subsequent encounter    3. Traumatic complete tear of right rotator cuff, subsequent encounter        Plan:  1. Recommend over the counter anti-inflammatories for pain and/or swelling  2. Irreparable right rotator cuff tear--plan to be to hold off on injection for now as we do not want to interfere with her surgery that can occur this week.  When she gets clearance to get an injection, we will repeat her subacromial junction.  We will go ahead and tentatively schedule something for 6 weeks from today.  We will also recommend that the exercise caution with the right shoulder when they position the patient for her cholecystectomy.      Tristan Gnozalez MD  08/23/22  13:06 EDT      Dictated Utilizing Dragon Dictation.

## 2022-09-27 ENCOUNTER — OFFICE VISIT (OUTPATIENT)
Dept: ORTHOPEDIC SURGERY | Facility: CLINIC | Age: 82
End: 2022-09-27

## 2022-09-27 VITALS
SYSTOLIC BLOOD PRESSURE: 117 MMHG | DIASTOLIC BLOOD PRESSURE: 70 MMHG | WEIGHT: 164 LBS | BODY MASS INDEX: 27.32 KG/M2 | HEIGHT: 65 IN

## 2022-09-27 DIAGNOSIS — S46.011D TRAUMATIC COMPLETE TEAR OF RIGHT ROTATOR CUFF, SUBSEQUENT ENCOUNTER: Primary | ICD-10-CM

## 2022-09-27 PROCEDURE — 20610 DRAIN/INJ JOINT/BURSA W/O US: CPT | Performed by: ORTHOPAEDIC SURGERY

## 2022-09-27 RX ORDER — LIDOCAINE HYDROCHLORIDE 10 MG/ML
3 INJECTION, SOLUTION EPIDURAL; INFILTRATION; INTRACAUDAL; PERINEURAL
Status: COMPLETED | OUTPATIENT
Start: 2022-09-27 | End: 2022-09-27

## 2022-09-27 RX ORDER — ONDANSETRON HYDROCHLORIDE 8 MG/1
TABLET, FILM COATED ORAL
COMMUNITY
Start: 2022-08-29

## 2022-09-27 RX ORDER — RIZATRIPTAN BENZOATE 10 MG/1
TABLET, ORALLY DISINTEGRATING ORAL
COMMUNITY

## 2022-09-27 RX ORDER — TRIAMCINOLONE ACETONIDE 40 MG/ML
40 INJECTION, SUSPENSION INTRA-ARTICULAR; INTRAMUSCULAR
Status: COMPLETED | OUTPATIENT
Start: 2022-09-27 | End: 2022-09-27

## 2022-09-27 RX ORDER — IBUPROFEN 600 MG/1
TABLET ORAL
COMMUNITY
Start: 2022-08-29

## 2022-09-27 RX ADMIN — TRIAMCINOLONE ACETONIDE 40 MG: 40 INJECTION, SUSPENSION INTRA-ARTICULAR; INTRAMUSCULAR at 11:55

## 2022-09-27 RX ADMIN — LIDOCAINE HYDROCHLORIDE 3 ML: 10 INJECTION, SOLUTION EPIDURAL; INFILTRATION; INTRACAUDAL; PERINEURAL at 11:55

## 2022-09-27 NOTE — PROGRESS NOTES
Procedure   Large Joint Arthrocentesis: R subacromial bursa  Date/Time: 9/27/2022 11:55 AM  Consent given by: patient  Site marked: site marked  Timeout: Immediately prior to procedure a time out was called to verify the correct patient, procedure, equipment, support staff and site/side marked as required   Supporting Documentation  Indications: pain   Procedure Details  Location: shoulder - R subacromial bursa  Preparation: Patient was prepped and draped in the usual sterile fashion  Needle size: 23 G  Approach: anterior  Medications administered: 3 mL lidocaine PF 1% 1 %; 40 mg triamcinolone acetonide 40 MG/ML  Patient tolerance: patient tolerated the procedure well with no immediate complications

## 2022-09-27 NOTE — PROGRESS NOTES
[facility] [place] [facility] Note        Subjective     CC: Follow-up ( 5 week recheck -  Injury of right shoulder, Traumatic complete tear of right rotator cuff - s/p shoulder arthroscopy with rotator cuff repair and major debridement - Right 3/3/21)      KRISTOPHER Clayton is a 82 y.o. female.  Patient here today for subacromial junction to the right shoulder.  She went ahead and had her cholecystectomy successfully.    Overall, patient's symptoms are unchanged from 8/23/2022.    ROS:    Constiutional:Pt denies fever, chills, nausea, or vomiting.  MSK:as above        Objective      Past Medical History  Past Medical History:   Diagnosis Date   • Anesthesia complication     decreased oxygen and heart rate after knee surgery    • Arthritis     osteoarthritis   • Cancer (HCC)     cervical and skin   • Disease of thyroid gland    • GERD (gastroesophageal reflux disease)    • Hard of hearing     has hearing aid but does not wear it    • History of transfusion    • Interstitial cystitis    • Knee swelling     Thats was mu first knee   • PONV (postoperative nausea and vomiting)    • Rotator cuff syndrome    • Wears dentures    • Wears glasses        Assessment    Assessment:  1. Traumatic complete tear of right rotator cuff, subsequent encounter        Plan:  1. Recommend over the counter anti-inflammatories for pain and/or swelling  2. Retear right rotator cuff repair--subacromial junction will be given today.  Patient is also telling me that she has a painful spot below the scapula on the right side and so we can keep an eye on this and if it continues to be painful or grows, advanced imaging of the spot can be obtained but hopefully will not be necessary.  Follow-up later this fall for her knees.    Procedure Note:    I discussed with the patient the potential benefits of performing a therapeutic injections as well as potential risks including but not limited to infection, swelling, pain, bleeding, bruising,  nerve/vessel damage, skin color changes, transient elevation in blood glucose levels, and fat atrophy. After informed consent and after the areas were prepped with chlorhexadine soap, ethyl chloride was used to numb the skin. Via the posterolateral approach, 3mL of 1% lidocaine followed by 40mg of Kenalog were each injected into the subacromial space of the right shoulder. The patient tolerated the procedure well. There were no complications. A sterile dressing was placed over the injection sites.        Tristan Gonzalez MD  09/27/22  12:59 EDT      Dictated Utilizing Dragon Dictation.

## 2022-10-04 ENCOUNTER — OFFICE VISIT (OUTPATIENT)
Dept: ORTHOPEDIC SURGERY | Facility: CLINIC | Age: 82
End: 2022-10-04

## 2022-10-04 VITALS
SYSTOLIC BLOOD PRESSURE: 110 MMHG | HEIGHT: 65 IN | WEIGHT: 164.02 LBS | BODY MASS INDEX: 27.33 KG/M2 | DIASTOLIC BLOOD PRESSURE: 78 MMHG

## 2022-10-04 DIAGNOSIS — Z96.653 STATUS POST TOTAL BILATERAL KNEE REPLACEMENT: Primary | ICD-10-CM

## 2022-10-04 DIAGNOSIS — M70.51 PES ANSERINUS BURSITIS OF RIGHT KNEE: ICD-10-CM

## 2022-10-04 PROCEDURE — 99213 OFFICE O/P EST LOW 20 MIN: CPT | Performed by: ORTHOPAEDIC SURGERY

## 2022-10-04 NOTE — PROGRESS NOTES
"    [facility] [place] [facility] Clinic Note        Subjective     CC: Follow-up (1 year follow up; Status post total bilateral knee replacement (Left TKA 8/21/2019, Right TKA 2009))      HPI    Eli Clayton is a 82 y.o. female.  Patient here for follow-up of her bilateral total knee arthroplasties.  Right side done by Dr. Valentin in 2009 and left side by me in August 2019.  Left side is giving her trouble.  Right side is starting to hurt on the medial side and she has some popping which causes quite a bit of pain.    Overall, patient's symptoms are as above.    ROS:    Constiutional:Pt denies fever, chills, nausea, or vomiting.  MSK:as above        Objective      Past Medical History  Past Medical History:   Diagnosis Date   • Anesthesia complication     decreased oxygen and heart rate after knee surgery    • Arthritis     osteoarthritis   • Cancer (HCC)     cervical and skin   • Disease of thyroid gland    • GERD (gastroesophageal reflux disease)    • Hard of hearing     has hearing aid but does not wear it    • History of transfusion    • Interstitial cystitis    • Knee swelling     Thats was mu first knee   • PONV (postoperative nausea and vomiting)    • Rotator cuff syndrome    • Wears dentures    • Wears glasses          Physical Exam  /78   Ht 165.1 cm (65\")   Wt 74.4 kg (164 lb 0.4 oz)   BMI 27.29 kg/m²     Body mass index is 27.29 kg/m².    Patient is well nourished and well developed.        Ortho Exam  Peripheral Vascular:    Upper Extremity:   Inspection:  Left--no cyanotic nail beds Right--no cyanotic nail beds   Bilateral:  Pink nail beds with brisk capillary refill   Palpation:  Bilateral radial pulse normal    Musculoskeletal:      Lower Extremity:     Inspection and Palpation:      Left knee:  Calf:  Soft and non tender  Effusion:  None  Pulses:  2+  Warmth:  None  Incision:  Healed     ROM:  Left:  Extension:0    Flexion:120      Deformities/Malalignments/Discrepancies:    Left:  " none    Functional Testing:  Left:  Straight Leg Raise: 5/5  Patella Tracking:  Normal      Peripheral Vascular:    Upper Extremity:   Inspection:  Left--no cyanotic nail beds Right--no cyanotic nail beds   Bilateral:  Pink nail beds with brisk capillary refill   Palpation:  Bilateral radial pulse normal    Musculoskeletal:      Lower Extremity:     Inspection and Palpation:      Right knee:  Calf:  Soft and non tender  Effusion:  None  Pulses:  2+  Warmth:  None  Tenderness: Medial joint line and over the pes bursa    ROM:  Right:  Extension:0    Flexion:120      Deformities/Malalignments/Discrepancies:    Right:  none    Functional Testing:  Right:  Straight Leg Raise: 5/5  Patella Tracking:  Normal    Imaging/Labs/EMG Reviewed:  Imaging Results (Last 24 Hours)     Procedure Component Value Units Date/Time    XR Knee 3 View Bilateral [[id number]] Resulted: 10/04/22 1135     Updated: 10/04/22 1136    Narrative:      Knee X-ray    Indication: status-post TKA    Study:  AP, Lateral, and Sunrise views of Bilateral knee    Comparison: Bilateral knees 9/23/2021    Findings:  No signs of acute fracture are visualized  No signs of loosening are appreciated  Components are well aligned    Impression:  Status post Bilateral total knee arthroplasty. No signs of   loosening or fracture.                Assessment    Assessment:  1. Status post total bilateral knee replacement    2. Pes anserinus bursitis of right knee        Plan:  1. Recommend over the counter anti-inflammatories for pain and/or swelling  2. Status post bilateral total knee arthroplasty--I do not see or appreciate any objective signs of loosening or significant polywear.  She is tender over the pes bursa and we will try to get her into physical therapy for hamstring stretches on the right side with iontophoresis to the pes bursa.  Hopefully this helps her.  See her back in a couple months and if she is no better, consider an injection.      Tristan Sage  MD Lisa  10/04/22  13:07 EDT      Dictated Utilizing Dragon Dictation.

## 2022-11-16 ENCOUNTER — TELEPHONE (OUTPATIENT)
Dept: ORTHOPEDIC SURGERY | Facility: CLINIC | Age: 82
End: 2022-11-16

## 2022-11-16 NOTE — TELEPHONE ENCOUNTER
Pt fell 11/12 and injured her Right knee. She was unable to go to PT do to the pain.    Therapist suggested she see or speak with Dr Gonzalez     Next appointment 11/29

## 2022-11-17 ENCOUNTER — OFFICE VISIT (OUTPATIENT)
Dept: ORTHOPEDIC SURGERY | Facility: CLINIC | Age: 82
End: 2022-11-17

## 2022-11-17 VITALS
SYSTOLIC BLOOD PRESSURE: 120 MMHG | WEIGHT: 160 LBS | BODY MASS INDEX: 26.66 KG/M2 | DIASTOLIC BLOOD PRESSURE: 68 MMHG | HEIGHT: 65 IN

## 2022-11-17 DIAGNOSIS — M25.561 ACUTE PAIN OF RIGHT KNEE: Primary | ICD-10-CM

## 2022-11-17 DIAGNOSIS — Z96.653 HISTORY OF BILATERAL KNEE REPLACEMENT: ICD-10-CM
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
## 2022-11-17 DIAGNOSIS — S80.01XA TRAUMATIC HEMATOMA OF RIGHT KNEE, INITIAL ENCOUNTER: ICD-10-CM

## 2022-11-17 DIAGNOSIS — S80.01XA CONTUSION OF RIGHT KNEE, INITIAL ENCOUNTER: ICD-10-CM

## 2022-11-17 PROCEDURE — 99213 OFFICE O/P EST LOW 20 MIN: CPT | Performed by: PHYSICIAN ASSISTANT

## 2022-11-17 NOTE — PROGRESS NOTES
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 female.  Patient presents today for evaluation of right knee following a fall on 11/12/2022.  She was attending physical therapy for her right knee, pes bursitis.  Patient had a fall last Saturday night resulting in pain, swelling as well as significant ecchymosis/bruising to the anterior medial part of her knee.  Therapy did not want to continue with PT until she was evaluated.    Pain scale: 8/10.  Associated symptoms swelling and stiffness.  Pain is typically worse with walking, standing, stair climbing, lying on affected side, rising from seated position, movement of joint.  She is using a cane to assist with ambulation.  No reported numbness or tingling.    Patient is on Eliquis.  History of bilateral TKAs (August 2019 on left with Dr. Gonzalez, 2009 on right with Dr. Valentin).      ROS:    Constiutional:Pt denies fever, chills, nausea, or vomiting.  MSK:as above        Objective      Past Medical History  Past Medical History:   Diagnosis Date   • Anesthesia complication     decreased oxygen and heart rate after knee surgery    • Arthritis     osteoarthritis   • Cancer (HCC)     cervical and skin   • Disease of thyroid gland    • GERD (gastroesophageal reflux disease)    • Hard of hearing     has hearing aid but does not wear it    • History of transfusion    • Interstitial cystitis    • Knee swelling     Thats was mu first knee   • PONV (postoperative nausea and vomiting)    • Rotator cuff syndrome    • Wears dentures    • Wears glasses          Physical Exam  /68   Ht 165.1 cm (65\")   Wt 72.6 kg (160 lb)   BMI 26.63 kg/m²     Body mass index is 26.63 kg/m².    Patient is well nourished and well developed.        Ortho Exam  Right knee  Skin: Intact without any erythema, warmth.  " Positive soft tissue swelling, hematoma/bruising anterior medial aspect knee.  Motion: 0-120°.  Tenderness: Positive tenderness noted anterior medial knee where soft tissue swelling, bruising and hematoma noted  Instability: Varus and valgus stress test negative.  Lachman's negative.  Straight leg raise: Intact.  Motor/sensory: Grossly intact L2-S1.      Imaging/Labs/EMG Reviewed:  Ordered right knee plain films.  Imaging read/interpreted by Dr. Gonzalez.    Imaging Results (Last 24 Hours)     Procedure Component Value Units Date/Time    XR Knee 3+ View With [place] Right [[id number]] Resulted: 11/17/22 0932     Updated: 11/17/22 0934    Narrative:      Knee X-ray    Indication: status-post TKA, recent fall    Study:  AP, Lateral, and Sunrise views of Right knee    Comparison: Right knee 10/4/2022 and 2/9/2021    Findings:  No clear signs of acute fracture are visualized.  Compared to the prior   studies, however, there does appear to be a slight change in alignment of   the tibial component but again this could be rotational.  Recommend repeat   imaging if clinical suspicion remains.  No signs of loosening are appreciated  Components are well aligned    Impression:  Status post Right total knee arthroplasty.  No clear signs of   obvious acute fracture but changes on today's radiographs are suspicious   for possible change in the alignment of the tibial component.  Recommend   repeat serial imaging            Assessment:  1. Acute pain of right knee    2. Contusion of right knee, initial encounter    3. Traumatic hematoma of right knee, initial encounter    4. History of bilateral knee replacement    5. Accidental fall, initial encounter        Plan:  1. Acute right knee pain due to contusion and traumatic hematoma following fall in setting of prior TKAs.  No evidence of fracture.  2. Patient may continue working with PT on range of motion, stretching and strengthening.  They can also assist with soft tissue  swelling control.  3. Recommend OTC pain medication as needed.  4. Will follow-up in 2 weeks for repeat evaluation.  Patient already has a scheduled appointment with Dr. Gonzalez on 11/29/2022 which she will keep.  Patient will need repeat imaging of right knee at this appointment.  5. Questions and concerns answered.    History, exam and imaging discussed with Dr. Gonzalez, who agrees with the above assessment and plan.      Danielle Mahajan PA-C  11/17/22  11:45 EST      Dictated Utilizing Dragon Dictation.

## 2022-11-29 ENCOUNTER — OFFICE VISIT (OUTPATIENT)
Dept: ORTHOPEDIC SURGERY | Facility: CLINIC | Age: 82
End: 2022-11-29

## 2022-11-29 VITALS
BODY MASS INDEX: 26.66 KG/M2 | WEIGHT: 160 LBS | SYSTOLIC BLOOD PRESSURE: 128 MMHG | HEIGHT: 65 IN | DIASTOLIC BLOOD PRESSURE: 74 MMHG

## 2022-11-29 DIAGNOSIS — S80.01XD CONTUSION OF RIGHT KNEE, SUBSEQUENT ENCOUNTER: Primary | ICD-10-CM

## 2022-11-29 DIAGNOSIS — Z96.653 HISTORY OF BILATERAL KNEE REPLACEMENT: ICD-10-CM

## 2022-11-29 PROCEDURE — 99213 OFFICE O/P EST LOW 20 MIN: CPT | Performed by: ORTHOPAEDIC SURGERY

## 2022-11-29 NOTE — PROGRESS NOTES
"    [facility] [place] Orthopaedic Surgery Clinic Note        Subjective     CC: Follow-up (2 week f/u-- Status post total bilateral knee replacement (Left TKA 8/21/2019, Right TKA 2009))      KRISTOPHER Clayton is a 82 y.o. female.  Patient here today for follow-up of her right knee injury.  She fell a few weeks ago and had a direct blow to the right knee.  We are concerned about loosening of her tibial component.  She says she has gotten better over the last few weeks but is still not well and has a lot of anteromedial knee pain.    Overall, patient's symptoms are as above.    ROS:    Constiutional:Pt denies fever, chills, nausea, or vomiting.  MSK:as above        Objective      Past Medical History  Past Medical History:   Diagnosis Date   • Anesthesia complication     decreased oxygen and heart rate after knee surgery    • Arthritis     osteoarthritis   • Cancer (HCC)     cervical and skin   • Disease of thyroid gland    • GERD (gastroesophageal reflux disease)    • Hard of hearing     has hearing aid but does not wear it    • History of transfusion    • Interstitial cystitis    • Knee swelling     Thats was mu first knee   • PONV (postoperative nausea and vomiting)    • Rotator cuff syndrome    • Wears dentures    • Wears glasses          Physical Exam  /74   Ht 165.1 cm (65\")   Wt 72.6 kg (160 lb)   BMI 26.63 kg/m²     Body mass index is 26.63 kg/m².    Patient is well nourished and well developed.        Ortho Exam  Patient is able to do straight leg raise with 5 out of 5 strength  No instability to valgus force at 0 or 30 degrees  Range of motion is 0-1 20  There is a consolidated hematoma anterior medially in the region of the pes    Imaging/Labs/EMG Reviewed:  Imaging Results (Last 24 Hours)     Procedure Component Value Units Date/Time    XR Knee 3+ View With Mark Right [[id number]] Resulted: 11/29/22 1203     Updated: 11/29/22 1204    Narrative:      Knee X-ray    Indication: status-post TKA    Study:  " AP, Lateral, and Sunrise views of Right knee    Comparison: Bilateral knees 10/4/2022 and right knee 11/17/2022    Findings:  No signs of acute fracture are visualized  No signs of loosening are appreciated  Components are well aligned    Impression:  Status post Right total knee arthroplasty. No signs of   loosening or fracture.                Assessment    Assessment:  1. Contusion of right knee, subsequent encounter    2. History of bilateral knee replacement        Plan:  1. Recommend over the counter anti-inflammatories for pain and/or swelling  2. Traumatic hematoma right knee after falling status post right total knee arthroplasty 2009--patient is improving.  She should continue PT if she would like.  I want to see her back in about 4 to 6 weeks with an x-ray of her knee to make sure she continues to improve.  She should call back if she has any setbacks.      Tristan Gonzalez MD  11/29/22  12:50 EST      Dictated Utilizing Dragon Dictation.

## 2023-01-10 ENCOUNTER — OFFICE VISIT (OUTPATIENT)
Dept: ORTHOPEDIC SURGERY | Facility: CLINIC | Age: 83
End: 2023-01-10
Payer: MEDICARE

## 2023-01-10 VITALS
SYSTOLIC BLOOD PRESSURE: 118 MMHG | BODY MASS INDEX: 27.19 KG/M2 | WEIGHT: 163.2 LBS | DIASTOLIC BLOOD PRESSURE: 74 MMHG | HEIGHT: 65 IN

## 2023-01-10 DIAGNOSIS — Z96.653 HISTORY OF BILATERAL KNEE REPLACEMENT: ICD-10-CM

## 2023-01-10 DIAGNOSIS — S80.01XD CONTUSION OF RIGHT KNEE, SUBSEQUENT ENCOUNTER: Primary | ICD-10-CM

## 2023-01-10 PROCEDURE — 99213 OFFICE O/P EST LOW 20 MIN: CPT | Performed by: ORTHOPAEDIC SURGERY

## 2023-01-10 NOTE — PROGRESS NOTES
[facility] [place] [facility] [place]        Subjective     CC: Follow-up (6 week f/u; Contusion of right knee (Rt TKA 2009))      HPI    Eli Clayton is a 82 y.o. female.  Patient here today for follow-up of her right knee injury.  Fell in November 2022 with a direct blow to the right knee.  We are seeing her today to make sure that things continue to improve and she tells me as much.  She says the right knee is much better overall.    Overall, patient's symptoms are as above.    ROS:    Constiutional:Pt denies fever, chills, nausea, or vomiting.  MSK:as above        Objective      Past Medical History  Past Medical History:   Diagnosis Date   • Anesthesia complication     decreased oxygen and heart rate after knee surgery    • Arthritis     osteoarthritis   • Cancer (HCC)     cervical and skin   • Disease of thyroid gland    • GERD (gastroesophageal reflux disease)    • Hard of hearing     has hearing aid but does not wear it    • History of transfusion    • Interstitial cystitis    • Knee swelling     Thats was mu first knee   • PONV (postoperative nausea and vomiting)    • Rotator cuff syndrome    • Wears dentures    • Wears glasses      Social History     Socioeconomic History   • Marital status:    Tobacco Use   • Smoking status: Never   • Smokeless tobacco: Never   Substance and Sexual Activity   • Alcohol use: No   • Drug use: No   • Sexual activity: Defer          Physical Exam  /74   Ht 165.1 cm (65\")   Wt 74 kg (163 lb 3.2 oz)   BMI 27.16 kg/m²     Body mass index is 27.16 kg/m².    Patient is well nourished and well developed.        Ortho Exam  Peripheral Vascular:    Upper Extremity:   Inspection:  Left--no cyanotic nail beds Right--no cyanotic nail beds   Bilateral:  Pink nail beds with brisk capillary refill   Palpation:  Bilateral radial pulse normal    Musculoskeletal:      Lower Extremity:     Inspection and Palpation:      Right knee:  Calf:  Soft and non tender  Effusion:   None  Pulses:  2+  Warmth:  None  Incision:  Healed     ROM:  Right:  Extension:0    Flexion:120      Deformities/Malalignments/Discrepancies:    Right:  none    Functional Testing:  Right:  Straight Leg Raise: 5/5  Patella Tracking:  Normal    Imaging/Labs/EMG Reviewed:  Imaging Results (Last 24 Hours)     Procedure Component Value Units Date/Time    XR Knee 3+ View With [place] Right [[id number]] Resulted: 01/10/23 1203     Updated: 01/10/23 1203    Narrative:      Knee X-ray    Indication: status-post TKA    Study:  AP, Lateral, and Sunrise views of Right knee    Comparison: Right knee 11/29/2022    Findings:  No signs of acute fracture are visualized  No signs of loosening are appreciated  Components are well aligned    Impression:  Status post Right total knee arthroplasty. No signs of   loosening or fracture.                Assessment    Assessment:  1. Contusion of right knee, subsequent encounter    2. History of bilateral knee replacement        Plan:  1. Recommend over the counter anti-inflammatories for pain and/or swelling  2. Right knee injury after right knee replacement--patient is continue to improve.  Extensor mechanism remains intact.  I think we can safely observe things for now.  See her back in August 2023 with x-rays of both knees.  Continue indefinite antibiotic prophylaxis.      Tristan Gonzalez MD  01/10/23  12:51 EST      Dictated Utilizing Dragon Dictation.

## 2023-02-23 ENCOUNTER — OFFICE VISIT (OUTPATIENT)
Dept: ORTHOPEDIC SURGERY | Facility: CLINIC | Age: 83
End: 2023-02-23
Payer: MEDICARE

## 2023-02-23 VITALS
DIASTOLIC BLOOD PRESSURE: 82 MMHG | WEIGHT: 161.6 LBS | BODY MASS INDEX: 26.92 KG/M2 | HEIGHT: 65 IN | SYSTOLIC BLOOD PRESSURE: 136 MMHG

## 2023-02-23 DIAGNOSIS — M19.011 ARTHRITIS OF RIGHT ACROMIOCLAVICULAR JOINT: ICD-10-CM

## 2023-02-23 DIAGNOSIS — S46.011D TRAUMATIC COMPLETE TEAR OF RIGHT ROTATOR CUFF, SUBSEQUENT ENCOUNTER: Primary | ICD-10-CM

## 2023-02-23 PROCEDURE — 20605 DRAIN/INJ JOINT/BURSA W/O US: CPT | Performed by: ORTHOPAEDIC SURGERY

## 2023-02-23 PROCEDURE — 99214 OFFICE O/P EST MOD 30 MIN: CPT | Performed by: ORTHOPAEDIC SURGERY

## 2023-02-23 RX ORDER — LIDOCAINE HYDROCHLORIDE 10 MG/ML
1 INJECTION, SOLUTION EPIDURAL; INFILTRATION; INTRACAUDAL; PERINEURAL
Status: COMPLETED | OUTPATIENT
Start: 2023-02-23 | End: 2023-02-23

## 2023-02-23 RX ORDER — TRIAMCINOLONE ACETONIDE 40 MG/ML
20 INJECTION, SUSPENSION INTRA-ARTICULAR; INTRAMUSCULAR
Status: COMPLETED | OUTPATIENT
Start: 2023-02-23 | End: 2023-02-23

## 2023-02-23 RX ADMIN — TRIAMCINOLONE ACETONIDE 20 MG: 40 INJECTION, SUSPENSION INTRA-ARTICULAR; INTRAMUSCULAR at 11:42

## 2023-02-23 RX ADMIN — LIDOCAINE HYDROCHLORIDE 1 ML: 10 INJECTION, SOLUTION EPIDURAL; INFILTRATION; INTRACAUDAL; PERINEURAL at 11:42

## 2023-02-23 NOTE — PROGRESS NOTES
Procedure   Medium Joint Arthrocentesis: R acromioclavicular  Date/Time: 2/23/2023 11:42 AM  Consent given by: patient  Site marked: site marked  Timeout: Immediately prior to procedure a time out was called to verify the correct patient, procedure, equipment, support staff and site/side marked as required   Supporting Documentation  Indications: pain   Procedure Details  Location: shoulder - R acromioclavicular  Preparation: Patient was prepped and draped in the usual sterile fashion  Needle size: 25 G  Approach: superior  Medications administered: 1 mL lidocaine PF 1% 1 %; 20 mg triamcinolone acetonide 40 MG/ML  Patient tolerance: patient tolerated the procedure well with no immediate complications

## 2023-02-23 NOTE — PROGRESS NOTES
"    [facility] [place] Orthopaedic Surgery Clinic Note        Subjective     CC: Follow-up ( 5 month follow up- 11 month 3 weeks s/p Traumatic complete tear of right rotator cuff - s/p shoulder arthroscopy with rotator cuff repair and major debridement - Right 3/3/21)      KRISTOPHER Clayton is a 82 y.o. female.  Patient returns the office today now almost 2 years out from right rotator cuff repair with 3 tear.  She is with her daughter today.  She is complaining of worsening right shoulder pain at the top of the shoulder.    Overall, patient's symptoms are as above.    ROS:    Constiutional:Pt denies fever, chills, nausea, or vomiting.  MSK:as above        Objective      Past Medical History  Past Medical History:   Diagnosis Date   • Anesthesia complication     decreased oxygen and heart rate after knee surgery    • Arthritis     osteoarthritis   • Cancer (HCC)     cervical and skin   • Disease of thyroid gland    • GERD (gastroesophageal reflux disease)    • Hard of hearing     has hearing aid but does not wear it    • History of transfusion    • Interstitial cystitis    • Knee swelling     Thats was mu first knee   • PONV (postoperative nausea and vomiting)    • Rotator cuff syndrome    • Wears dentures    • Wears glasses      Social History     Socioeconomic History   • Marital status:    Tobacco Use   • Smoking status: Never   • Smokeless tobacco: Never   Substance and Sexual Activity   • Alcohol use: No   • Drug use: No   • Sexual activity: Defer          Physical Exam  /82   Ht 165.1 cm (65\")   Wt 73.3 kg (161 lb 9.6 oz)   BMI 26.89 kg/m²     Body mass index is 26.89 kg/m².    Patient is well nourished and well developed.        Ortho Exam  Musculoskeletal   Upper Extremity   Right Shoulder       Strength and Tone:    Supraspinatus -5 out of 5    External Rotators-5/5    Infraspinatus - 5/5    Subscapularis - 5/5    Deltoid - 5/5     Range of Motion      Right Shoulder:    Internal Rotation: ROM - " L4    External Rotation: AROM - 70 degrees    Elevation through flexion: AROM - 140 degrees     AC joint: Exquisitely tender to palpation    AC joint: Positive crossover      Imaging/Labs/EMG Reviewed:  Imaging Results (Last 24 Hours)     Procedure Component Value Units Date/Time    XR Shoulder 2+ View Right [[id number]] Resulted: 02/23/23 1227     Updated: 02/23/23 1228    Narrative:      Right Shoulder X-Ray    Indication: Pain    Study:  AP, axillary lateral, and scapular Y views    Comparison: Right shoulder 2/20/2020    Findings:  No acute fractures are visualized  No bony lesions are visualized.  Normal soft tissue appearance  AC joint: Severe joint space narrowing  Glenohumeral joint: Mild joint space narrowing  Acromion type: 3      Impression:    No acute bony abnormalities noted  Type III acromion  Severe AC joint space narrowing              Assessment    Assessment:  1. Traumatic complete tear of right rotator cuff, subsequent encounter    2. Arthritis of right acromioclavicular joint        Plan:  1. Recommend over the counter anti-inflammatories for pain and/or swelling  2. Retear right rotator cuff--patient has a well-balanced cuff overall and she has reasonably good range of motion.  She has been injected in the past subacromially and got some relief only.  3. AC joint arthritis--patient's shoulder is highly symptomatic at the AC joint today.  Diagnostic and therapeutic injection was given into the right AC joint.  Follow-up in 4 months to assess efficacy.    Procedure Note:    I discussed with the patient the potential benefits of performing a therapeutic injections as well as potential risks including but not limited to infection, swelling, pain, bleeding, bruising, nerve/vessel damage, skin color changes, transient elevation in blood glucose levels, and fat atrophy. After informed consent and after the areas were prepped with chlorhexadine soap, ethyl chloride was used to numb the skin. Via the  superior approach, a combination of 1mL of 1% lidocaine and 20mg of Kenalog were injected into the AC joint of the right shoulder. The patient tolerated the procedure well. There were no complications. A sterile dressing was placed over the injection sites.        Tristan Gonzalez MD  02/23/23  16:46 EST      Dictated Utilizing Dragon Dictation.

## 2023-08-10 ENCOUNTER — OFFICE VISIT (OUTPATIENT)
Dept: ORTHOPEDIC SURGERY | Facility: CLINIC | Age: 83
End: 2023-08-10
Payer: MEDICARE

## 2023-08-10 VITALS
BODY MASS INDEX: 28.46 KG/M2 | WEIGHT: 160.6 LBS | SYSTOLIC BLOOD PRESSURE: 128 MMHG | HEIGHT: 63 IN | DIASTOLIC BLOOD PRESSURE: 78 MMHG

## 2023-08-10 DIAGNOSIS — Z96.653 HISTORY OF BILATERAL KNEE REPLACEMENT: Primary | ICD-10-CM

## 2023-08-10 NOTE — PROGRESS NOTES
"    [facility] [place] Orthopaedic Surgery Clinic Note        Subjective     CC: Follow-up (7 week f/u--History of bilateral knee replacement)      HPI    Eli Clayton is a 83 y.o. female.  Patient is here with her daughter today for follow-up of her bilateral knee replacements.  Right side replaced by Dr. Valentin in 2009.  I replaced her left side in August 2019.  She is doing well with no major complaints.    Overall, patient's symptoms are as above.    ROS:    Constiutional:Pt denies fever, chills, nausea, or vomiting.  MSK:as above        Objective      Past Medical History  Past Medical History:   Diagnosis Date    Anesthesia complication     decreased oxygen and heart rate after knee surgery     Arthritis     osteoarthritis    Cancer     cervical and skin    Disease of thyroid gland     GERD (gastroesophageal reflux disease)     Hard of hearing     has hearing aid but does not wear it     History of transfusion     Interstitial cystitis     Knee swelling     Thats was mu first knee    PONV (postoperative nausea and vomiting)     Rotator cuff syndrome     Wears dentures     Wears glasses      Social History     Socioeconomic History    Marital status:    Tobacco Use    Smoking status: Never    Smokeless tobacco: Never   Vaping Use    Vaping Use: Never used   Substance and Sexual Activity    Alcohol use: No    Drug use: No    Sexual activity: Defer          Physical Exam  /78   Ht 160.5 cm (63.19\")   Wt 72.8 kg (160 lb 9.6 oz)   BMI 28.28 kg/mý     Body mass index is 28.28 kg/mý.    Patient is well nourished and well developed.        Ortho Exam        Lower Extremity:     Inspection and Palpation:      Bilateral knees:  Calf:  Soft and non tender  Effusion:  None  Pulses:  2+  Warmth:  None  Incision:  Healed     ROM:  Right:  Extension:0    Flexion:120  Left:     Extension: 0    Flexion:  120      Functional Testing:  Bilateral:  Straight Leg Raise: 5/5  Patella Tracking:  " Normal          Imaging/Labs/EMG Reviewed:  Imaging Results (Last 24 Hours)       Procedure Component Value Units Date/Time    XR Knee 3 View Bilateral [[id number]] Resulted: 08/10/23 1430     Updated: 08/10/23 1431    Narrative:      Knee X-ray    Indication: status-post TKA    Study:  AP, Lateral, and Sunrise views of Bilateral knee    Comparison: Bilateral knees 10/4/2022    Findings:  No signs of acute fracture are visualized  No signs of loosening are appreciated  Components are well aligned    Impression:  Status post Bilateral total knee arthroplasty. No signs of   loosening or fracture.                Assessment    Assessment:  1. History of bilateral knee replacement        Plan:  Recommend over the counter anti-inflammatories for pain and/or swelling  History of bilateral total knee replacements--patient is currently doing well.  Her to an implant on the right side seems to be holding up and not loosening.  I will see her back in 2 years with x-rays of both knees.  Continue indefinite antibiotic prophylaxis.      Tristan Gonzalez MD  08/10/23  16:55 EDT      Dictated Utilizing Dragon Dictation.

## 2023-10-19 ENCOUNTER — OFFICE VISIT (OUTPATIENT)
Dept: ORTHOPEDIC SURGERY | Facility: CLINIC | Age: 83
End: 2023-10-19
Payer: MEDICARE

## 2023-10-19 VITALS
WEIGHT: 160.8 LBS | HEIGHT: 63 IN | SYSTOLIC BLOOD PRESSURE: 116 MMHG | DIASTOLIC BLOOD PRESSURE: 80 MMHG | BODY MASS INDEX: 28.49 KG/M2

## 2023-10-19 DIAGNOSIS — M19.011 ARTHRITIS OF RIGHT ACROMIOCLAVICULAR JOINT: ICD-10-CM

## 2023-10-19 DIAGNOSIS — S46.011D TRAUMATIC COMPLETE TEAR OF RIGHT ROTATOR CUFF, SUBSEQUENT ENCOUNTER: Primary | ICD-10-CM

## 2023-10-19 RX ORDER — LIDOCAINE HYDROCHLORIDE 10 MG/ML
1 INJECTION, SOLUTION EPIDURAL; INFILTRATION; INTRACAUDAL; PERINEURAL
Status: COMPLETED | OUTPATIENT
Start: 2023-10-19 | End: 2023-10-19

## 2023-10-19 RX ORDER — TRIAMCINOLONE ACETONIDE 40 MG/ML
20 INJECTION, SUSPENSION INTRA-ARTICULAR; INTRAMUSCULAR
Status: COMPLETED | OUTPATIENT
Start: 2023-10-19 | End: 2023-10-19

## 2023-10-19 RX ADMIN — LIDOCAINE HYDROCHLORIDE 1 ML: 10 INJECTION, SOLUTION EPIDURAL; INFILTRATION; INTRACAUDAL; PERINEURAL at 11:41

## 2023-10-19 RX ADMIN — TRIAMCINOLONE ACETONIDE 20 MG: 40 INJECTION, SUSPENSION INTRA-ARTICULAR; INTRAMUSCULAR at 11:41

## 2023-10-19 NOTE — PROGRESS NOTES
[facility] [place] Orthopaedic Surgery Clinic Note        Subjective     CC: Follow-up (4 month follow up -- Traumatic complete tear of right rotator cuff; Arthritis of right acromioclavicular joint)      KRISTOPHER    Eli Clayton is a 83 y.o. female.  Patient returns the office today for follow-up.  She is with her daughter today.  Shoulder pain has returned and continues to bother her reaching across her body and with much use of the shoulder.  Last 2 injections have been in the AC joint which have helped her tremendously and she tells me they have helped her much more than the subacromial injections.    Overall, patient's symptoms are as above.    ROS:    Constiutional:Pt denies fever, chills, nausea, or vomiting.  MSK:as above        Objective      Past Medical History  Past Medical History:   Diagnosis Date    Anesthesia complication     decreased oxygen and heart rate after knee surgery     Arthritis     osteoarthritis    Cancer     cervical and skin    Disease of thyroid gland     GERD (gastroesophageal reflux disease)     Hard of hearing     has hearing aid but does not wear it     History of transfusion     Interstitial cystitis     Knee swelling     Thats was mu first knee    PONV (postoperative nausea and vomiting)     Rotator cuff syndrome     Wears dentures     Wears glasses      Social History     Socioeconomic History    Marital status:    Tobacco Use    Smoking status: Never    Smokeless tobacco: Never   Vaping Use    Vaping Use: Never used   Substance and Sexual Activity    Alcohol use: No    Drug use: No    Sexual activity: Defer        Assessment    Assessment:  1. Traumatic complete tear of right rotator cuff, subsequent encounter    2. Arthritis of right acromioclavicular joint        Plan:  Recommend over the counter anti-inflammatories for pain and/or swelling  AC joint arthritis in the face of underlying rotator cuff tear--repeat injection will be given today.  Follow-up in 4 months.    Procedure  Note:    I discussed with the patient the potential benefits of performing a therapeutic injections as well as potential risks including but not limited to infection, swelling, pain, bleeding, bruising, nerve/vessel damage, skin color changes, transient elevation in blood glucose levels, and fat atrophy. After informed consent and after the areas were prepped with chlorhexadine soap, ethyl chloride was used to numb the skin. Via the superior approach, a combination of 1mL of 1% lidocaine and 20mg of Kenalog were injected into the AC joint of the right shoulder. The patient tolerated the procedure well. There were no complications. A sterile dressing was placed over the injection sites.        Tristan Gonzalez MD  10/19/23  13:27 EDT      Dictated Utilizing Dragon Dictation.

## 2023-10-19 NOTE — PROGRESS NOTES
Procedure   Medium Joint Arthrocentesis: R acromioclavicular  Date/Time: 10/19/2023 11:41 AM  Consent given by: patient  Site marked: site marked  Timeout: Immediately prior to procedure a time out was called to verify the correct patient, procedure, equipment, support staff and site/side marked as required   Supporting Documentation  Indications: pain   Procedure Details  Location: shoulder - R acromioclavicular  Preparation: Patient was prepped and draped in the usual sterile fashion  Needle size: 25 G  Approach: anteromedial  Medications administered: 1 mL lidocaine PF 1% 1 %; 20 mg triamcinolone acetonide 40 MG/ML  Patient tolerance: patient tolerated the procedure well with no immediate complications

## 2024-02-20 ENCOUNTER — OFFICE VISIT (OUTPATIENT)
Dept: ORTHOPEDIC SURGERY | Facility: CLINIC | Age: 84
End: 2024-02-20
Payer: MEDICARE

## 2024-02-20 VITALS
WEIGHT: 161 LBS | HEIGHT: 63 IN | BODY MASS INDEX: 28.53 KG/M2 | DIASTOLIC BLOOD PRESSURE: 84 MMHG | SYSTOLIC BLOOD PRESSURE: 120 MMHG

## 2024-02-20 DIAGNOSIS — S46.011D TRAUMATIC COMPLETE TEAR OF RIGHT ROTATOR CUFF, SUBSEQUENT ENCOUNTER: Primary | ICD-10-CM

## 2024-02-20 DIAGNOSIS — M19.011 ARTHRITIS OF RIGHT ACROMIOCLAVICULAR JOINT: ICD-10-CM

## 2024-02-20 PROCEDURE — 20605 DRAIN/INJ JOINT/BURSA W/O US: CPT | Performed by: ORTHOPAEDIC SURGERY

## 2024-02-20 RX ORDER — TRIAMCINOLONE ACETONIDE 40 MG/ML
20 INJECTION, SUSPENSION INTRA-ARTICULAR; INTRAMUSCULAR
Status: COMPLETED | OUTPATIENT
Start: 2024-02-20 | End: 2024-02-20

## 2024-02-20 RX ORDER — LIDOCAINE HYDROCHLORIDE 10 MG/ML
1 INJECTION, SOLUTION EPIDURAL; INFILTRATION; INTRACAUDAL; PERINEURAL
Status: COMPLETED | OUTPATIENT
Start: 2024-02-20 | End: 2024-02-20

## 2024-02-20 RX ADMIN — TRIAMCINOLONE ACETONIDE 20 MG: 40 INJECTION, SUSPENSION INTRA-ARTICULAR; INTRAMUSCULAR at 11:44

## 2024-02-20 RX ADMIN — LIDOCAINE HYDROCHLORIDE 1 ML: 10 INJECTION, SOLUTION EPIDURAL; INFILTRATION; INTRACAUDAL; PERINEURAL at 11:44

## 2024-02-20 NOTE — PROGRESS NOTES
"    [facility] [place] Orthopaedic Surgery Clinic Note        Subjective     CC: Follow-up (4 month f/u -  Traumatic complete tear of right rotator cuff; Arthritis of right acromioclavicular joint. Last cortisone injection 10.19.2023/)      HPI    Eli Clayton is a 83 y.o. female.  Patient returns the office today now 4 months out from her last corticosteroid injection into the AC joint.  This helped her for couple months but is worn off and now her pain is fairly severe.  Denies fever chills nausea vomiting.  Denies any redness in the shoulder.    Overall, patient's symptoms are as above.    ROS:    Constiutional:Pt denies fever, chills, nausea, or vomiting.  MSK:as above        Objective      Past Medical History  Past Medical History:   Diagnosis Date    Anesthesia complication     decreased oxygen and heart rate after knee surgery     Arthritis     osteoarthritis    Cancer     cervical and skin    Disease of thyroid gland     GERD (gastroesophageal reflux disease)     Hard of hearing     has hearing aid but does not wear it     History of transfusion     Interstitial cystitis     Knee swelling     Thats was mu first knee    PONV (postoperative nausea and vomiting)     Rotator cuff syndrome     Wears dentures     Wears glasses      Social History     Socioeconomic History    Marital status:    Tobacco Use    Smoking status: Never    Smokeless tobacco: Never   Vaping Use    Vaping Use: Never used   Substance and Sexual Activity    Alcohol use: No    Drug use: No    Sexual activity: Defer          Physical Exam  /84   Ht 160.5 cm (63.19\")   Wt 73 kg (161 lb)   BMI 28.35 kg/m²     Body mass index is 28.35 kg/m².    Patient is well nourished and well developed.        Ortho Exam  Musculoskeletal   Upper Extremity   Right Shoulder       Strength and Tone:    Supraspinatus -4-5    External Rotators-5/5    Infraspinatus - 5/5    Subscapularis - 5/5    Deltoid - 5/5     Range of Motion      Right Shoulder:    Internal " Rotation: ROM - L4    External Rotation: AROM - 70 degrees    Elevation through flexion: AROM - 140 degrees     AC joint:  tender to palpation    AC joint:  positive crossover      Imaging/Labs/EMG Reviewed:  Imaging Results (Last 24 Hours)       ** No results found for the last 24 hours. **              Assessment    Assessment:  1. Traumatic complete tear of right rotator cuff, subsequent encounter    2. Arthritis of right acromioclavicular joint        Plan:  Recommend over the counter anti-inflammatories for pain and/or swelling  Right AC joint arthritis status post retear right rotator cuff--repeat injection will be given today.  Will check her back in 6 weeks given how much she is hurting and get an x-ray of her shoulder make sure that the AC joint still looks reasonably good and if she has severe pain, consider repeat advanced imaging.      Procedure Note:    I discussed with the patient the potential benefits of performing a therapeutic injections as well as potential risks including but not limited to infection, swelling, pain, bleeding, bruising, nerve/vessel damage, skin color changes, transient elevation in blood glucose levels, and fat atrophy. After informed consent and after the areas were prepped with chlorhexadine soap, ethyl chloride was used to numb the skin. Via the superior approach, a combination of 1mL of 1% lidocaine and 20mg of Kenalog were injected into the AC joint of the right shoulder. The patient tolerated the procedure well. There were no complications. A sterile dressing was placed over the injection sites.        Tristan Gonzalez MD  02/20/24  12:09 EST      Dictated Utilizing Dragon Dictation.

## 2024-02-20 NOTE — PROGRESS NOTES
Procedure   - Medium Joint Arthrocentesis: R acromioclavicular on 2/20/2024 11:44 AM  Indications: pain  Details: 21 G needle, anteromedial approach  Medications: 1 mL lidocaine PF 1% 1 %; 20 mg triamcinolone acetonide 40 MG/ML  Outcome: tolerated well, no immediate complications  Procedure, treatment alternatives, risks and benefits explained, specific risks discussed. Consent was given by the patient. Immediately prior to procedure a time out was called to verify the correct patient, procedure, equipment, support staff and site/side marked as required. Patient was prepped and draped in the usual sterile fashion.

## 2024-04-16 ENCOUNTER — OFFICE VISIT (OUTPATIENT)
Dept: ORTHOPEDIC SURGERY | Facility: CLINIC | Age: 84
End: 2024-04-16
Payer: MEDICARE

## 2024-04-16 ENCOUNTER — HOSPITAL ENCOUNTER (OUTPATIENT)
Dept: CARDIOLOGY | Facility: HOSPITAL | Age: 84
Discharge: HOME | End: 2024-04-16
Admitting: ORTHOPAEDIC SURGERY
Payer: MEDICARE

## 2024-04-16 ENCOUNTER — TELEPHONE (OUTPATIENT)
Dept: ORTHOPEDIC SURGERY | Facility: CLINIC | Age: 84
End: 2024-04-16

## 2024-04-16 VITALS
WEIGHT: 160.8 LBS | DIASTOLIC BLOOD PRESSURE: 80 MMHG | BODY MASS INDEX: 28.49 KG/M2 | SYSTOLIC BLOOD PRESSURE: 122 MMHG | HEIGHT: 63 IN

## 2024-04-16 VITALS — WEIGHT: 160 LBS | BODY MASS INDEX: 28.35 KG/M2 | HEIGHT: 63 IN

## 2024-04-16 DIAGNOSIS — S89.92XA INJURY OF LEFT KNEE, INITIAL ENCOUNTER: ICD-10-CM

## 2024-04-16 DIAGNOSIS — M79.89 LEFT LEG SWELLING: ICD-10-CM

## 2024-04-16 DIAGNOSIS — Z96.652 STATUS POST TOTAL LEFT KNEE REPLACEMENT: ICD-10-CM

## 2024-04-16 DIAGNOSIS — M19.011 ARTHRITIS OF RIGHT ACROMIOCLAVICULAR JOINT: Primary | ICD-10-CM

## 2024-04-16 LAB
BH CV LOW VAS LEFT GASTRONEMIUS VESSEL: 1
BH CV LOWER VASCULAR LEFT COMMON FEMORAL AUGMENT: NORMAL
BH CV LOWER VASCULAR LEFT COMMON FEMORAL COMPRESS: NORMAL
BH CV LOWER VASCULAR LEFT COMMON FEMORAL PHASIC: NORMAL
BH CV LOWER VASCULAR LEFT COMMON FEMORAL SPONT: NORMAL
BH CV LOWER VASCULAR LEFT DISTAL FEMORAL AUGMENT: NORMAL
BH CV LOWER VASCULAR LEFT DISTAL FEMORAL COMPRESS: NORMAL
BH CV LOWER VASCULAR LEFT DISTAL FEMORAL PHASIC: NORMAL
BH CV LOWER VASCULAR LEFT DISTAL FEMORAL SPONT: NORMAL
BH CV LOWER VASCULAR LEFT GASTRONEMIUS COMPRESS: NORMAL
BH CV LOWER VASCULAR LEFT GREATER SAPH AK COMPRESS: NORMAL
BH CV LOWER VASCULAR LEFT GREATER SAPH BK COMPRESS: NORMAL
BH CV LOWER VASCULAR LEFT LESSER SAPH COMPRESS: NORMAL
BH CV LOWER VASCULAR LEFT MID FEMORAL AUGMENT: NORMAL
BH CV LOWER VASCULAR LEFT MID FEMORAL COMPRESS: NORMAL
BH CV LOWER VASCULAR LEFT MID FEMORAL PHASIC: NORMAL
BH CV LOWER VASCULAR LEFT MID FEMORAL SPONT: NORMAL
BH CV LOWER VASCULAR LEFT PERONEAL COMPRESS: NORMAL
BH CV LOWER VASCULAR LEFT POPLITEAL AUGMENT: NORMAL
BH CV LOWER VASCULAR LEFT POPLITEAL COMPRESS: NORMAL
BH CV LOWER VASCULAR LEFT POPLITEAL PHASIC: NORMAL
BH CV LOWER VASCULAR LEFT POPLITEAL SPONT: NORMAL
BH CV LOWER VASCULAR LEFT POSTERIOR TIBIAL COMPRESS: NORMAL
BH CV LOWER VASCULAR LEFT PROFUNDA FEMORAL AUGMENT: NORMAL
BH CV LOWER VASCULAR LEFT PROFUNDA FEMORAL PHASIC: NORMAL
BH CV LOWER VASCULAR LEFT PROFUNDA FEMORAL SPONT: NORMAL
BH CV LOWER VASCULAR LEFT PROXIMAL FEMORAL AUGMENT: NORMAL
BH CV LOWER VASCULAR LEFT PROXIMAL FEMORAL COMPRESS: NORMAL
BH CV LOWER VASCULAR LEFT PROXIMAL FEMORAL PHASIC: NORMAL
BH CV LOWER VASCULAR LEFT PROXIMAL FEMORAL SPONT: NORMAL
BH CV LOWER VASCULAR LEFT SAPHENOFEMORAL JUNCTION AUGMENT: NORMAL
BH CV LOWER VASCULAR LEFT SAPHENOFEMORAL JUNCTION COMPRESS: NORMAL
BH CV LOWER VASCULAR LEFT SAPHENOFEMORAL JUNCTION PHASIC: NORMAL
BH CV LOWER VASCULAR LEFT SAPHENOFEMORAL JUNCTION SPONT: NORMAL
BH CV LOWER VASCULAR RIGHT COMMON FEMORAL AUGMENT: NORMAL
BH CV LOWER VASCULAR RIGHT COMMON FEMORAL PHASIC: NORMAL
BH CV LOWER VASCULAR RIGHT COMMON FEMORAL SPONT: NORMAL
BH CV VAS PRELIMINARY FINDINGS SCRIPTING: 1

## 2024-04-16 PROCEDURE — 93971 EXTREMITY STUDY: CPT

## 2024-04-16 PROCEDURE — 93971 EXTREMITY STUDY: CPT | Performed by: INTERNAL MEDICINE

## 2024-04-16 PROCEDURE — 99214 OFFICE O/P EST MOD 30 MIN: CPT | Performed by: ORTHOPAEDIC SURGERY

## 2024-04-16 NOTE — TELEPHONE ENCOUNTER
Patient called and notified of her positive venous duplex on the left lower extremity.  Eliquis starter pack will be called in for her.  We have instructed her to get in touch with her PCP to make an appointment within a week for further management and treatment and to notify him of the results.

## 2024-04-16 NOTE — PROGRESS NOTES
"    [facility] [place] [facility] Note        Subjective     CC: Follow-up (2 month follow up- Traumatic complete tear of right rotator cuff; Arthritis of right acromioclavicular joint and left total knee replacement )      KRISTOPHER Clayton is a 83 y.o. female.  Patient is here with her daughter today for follow-up of her right AC joint arthritis.  Last injection 2/20/2024.  More importantly, she sustained a ground-level fall after tripping on the sidewalk 2-1/2 weeks ago.  Since that time she has had severe anterior left knee pain.  We replaced her left knee in August 2019.  She denies chest pain or shortness of breath.  She has had increased warmth in the leg and also swelling in the calf and knee.    Overall, patient's symptoms are as above.    ROS:    Constiutional:Pt denies fever, chills, nausea, or vomiting.  MSK:as above        Objective      Past Medical History  Past Medical History:   Diagnosis Date    Anesthesia complication     decreased oxygen and heart rate after knee surgery     Arthritis     osteoarthritis    Cancer     cervical and skin    Disease of thyroid gland     GERD (gastroesophageal reflux disease)     Hard of hearing     has hearing aid but does not wear it     History of transfusion     Interstitial cystitis     Knee swelling     Thats was mu first knee    PONV (postoperative nausea and vomiting)     Rotator cuff syndrome     Wears dentures     Wears glasses      Social History     Socioeconomic History    Marital status:    Tobacco Use    Smoking status: Never    Smokeless tobacco: Never   Vaping Use    Vaping status: Never Used   Substance and Sexual Activity    Alcohol use: No    Drug use: No    Sexual activity: Defer          Physical Exam  /80   Ht 160.5 cm (63.19\")   Wt 72.9 kg (160 lb 12.8 oz)   BMI 28.31 kg/m²     Body mass index is 28.31 kg/m².    Patient is well nourished and well developed.        Ortho Exam  Musculoskeletal   Upper Extremity   Right " Shoulder       Strength and Tone:    Supraspinatus -4-5 with pain    External Rotators-5/5    Infraspinatus - 5/5    Subscapularis - 5/5    Deltoid - 5/5     Range of Motion      Right Shoulder:    Internal Rotation: ROM - L4    External Rotation: AROM - 70 degrees    Elevation through flexion: AROM - 140 degrees     AC joint:  non tender to palpation    AC joint:  negative crossover        Left lower extremity: Patient is able to do a straight leg raise.  There is diffuse ecchymosis anteriorly from the distal one third of her total knee incision and distal.  There is some tenderness in her calf.  Patient's tibia is nontender to palpation.  She is exquisitely tender around the region of the patella tendon and inferior pole the patella.      Imaging/Labs/EMG Reviewed:  Imaging Results (Last 24 Hours)       Procedure Component Value Units Date/Time    XR Knee 3+ View With Deshler Left [[id number]] Resulted: 04/16/24 1254     Updated: 04/16/24 1255    Narrative:      Knee X-ray    Indication: status-post TKA, recent history of falling    Study:  AP, Lateral, and Sunrise views of Left knee    Comparison: Bilateral knee 8/10/2023    Findings:  No signs of acute fracture are visualized  No signs of loosening are appreciated  Components are well aligned  Soft tissue swelling is noted anteriorly and posteriorly.    Impression:  Status post Left total knee arthroplasty. No signs of   loosening or fracture.      XR Shoulder 2+ View Right [[id number]] Resulted: 04/16/24 1253     Updated: 04/16/24 1254    Narrative:      Right Shoulder X-Ray    Indication: Pain    Study:  Grashey AP, axillary lateral, and scapular Y views    Comparison: Right shoulder 2/23/2023    Findings:  No acute fractures are visualized  No bony lesions are visualized.  Normal soft tissue appearance  AC joint: Moderate joint space narrowing  Glenohumeral joint: Mild joint space narrowing  Acromion type: 2      Impression:    No acute bony abnormalities  noted  Type II acromion  Moderate AC joint space narrowing              Assessment    Assessment:  1. Arthritis of right acromioclavicular joint    2. Status post total left knee replacement    3. Left leg swelling    4. Injury of left knee, initial encounter        Plan:  Recommend over the counter anti-inflammatories for pain and/or swelling  Left knee injury with left leg swelling--plan will be for venous duplex.  I do not believe she has ruptured her patella tendon or quad tendon or fracture to patella or tibia.  Implants look stable radiographically.  We will get a venous duplex to see if this explains her left leg pain.  In the absence of a blood clot, we will treat this with compression and rest.  Right AC joint arthritis--too soon for repeat injection.  Follow-up in 2 months.      Tristan Gonzalez MD  04/16/24  13:04 EDT      Dictated Utilizing Dragon Dictation.

## 2024-06-18 ENCOUNTER — OFFICE VISIT (OUTPATIENT)
Dept: ORTHOPEDIC SURGERY | Facility: CLINIC | Age: 84
End: 2024-06-18
Payer: MEDICARE

## 2024-06-18 VITALS
WEIGHT: 156.8 LBS | BODY MASS INDEX: 27.78 KG/M2 | DIASTOLIC BLOOD PRESSURE: 66 MMHG | SYSTOLIC BLOOD PRESSURE: 116 MMHG | HEIGHT: 63 IN

## 2024-06-18 DIAGNOSIS — M19.011 ARTHRITIS OF RIGHT ACROMIOCLAVICULAR JOINT: Primary | ICD-10-CM

## 2024-06-18 PROCEDURE — 20605 DRAIN/INJ JOINT/BURSA W/O US: CPT | Performed by: ORTHOPAEDIC SURGERY

## 2024-06-18 RX ORDER — APIXABAN 5 MG/1
TABLET, FILM COATED ORAL
COMMUNITY
Start: 2024-05-22

## 2024-06-18 RX ORDER — PAROXETINE 10 MG/1
TABLET, FILM COATED ORAL
COMMUNITY

## 2024-06-18 RX ORDER — VIBEGRON 75 MG/1
1 TABLET, FILM COATED ORAL DAILY
COMMUNITY

## 2024-06-18 RX ORDER — TRIAMCINOLONE ACETONIDE 40 MG/ML
20 INJECTION, SUSPENSION INTRA-ARTICULAR; INTRAMUSCULAR
Status: COMPLETED | OUTPATIENT
Start: 2024-06-18 | End: 2024-06-18

## 2024-06-18 RX ORDER — LIDOCAINE HYDROCHLORIDE 10 MG/ML
1 INJECTION, SOLUTION EPIDURAL; INFILTRATION; INTRACAUDAL; PERINEURAL
Status: COMPLETED | OUTPATIENT
Start: 2024-06-18 | End: 2024-06-18

## 2024-06-18 RX ADMIN — LIDOCAINE HYDROCHLORIDE 1 ML: 10 INJECTION, SOLUTION EPIDURAL; INFILTRATION; INTRACAUDAL; PERINEURAL at 12:12

## 2024-06-18 RX ADMIN — TRIAMCINOLONE ACETONIDE 20 MG: 40 INJECTION, SUSPENSION INTRA-ARTICULAR; INTRAMUSCULAR at 12:12

## 2024-06-18 NOTE — PROGRESS NOTES
Procedure   - Medium Joint Arthrocentesis: R acromioclavicular on 6/18/2024 12:12 PM  Indications: pain  Details: 25 G needle, superior approach  Medications: 1 mL lidocaine PF 1% 1 %; 20 mg triamcinolone acetonide 40 MG/ML  Outcome: tolerated well, no immediate complications  Procedure, treatment alternatives, risks and benefits explained, specific risks discussed. Consent was given by the patient. Immediately prior to procedure a time out was called to verify the correct patient, procedure, equipment, support staff and site/side marked as required. Patient was prepped and draped in the usual sterile fashion.

## 2024-06-18 NOTE — PROGRESS NOTES
[facility] [place] [facility] [place]        Subjective     CC: Follow-up (3 month follow up - Arthritis of right acromioclavicular joint, Right Shoulder)      HPI    Eli Clayton is a 84 y.o. female.  Patient returns with her daughter today for her AC joint.  Was last seen 2 months ago and that was too soon for repeat injection.  Has been complaining of right shoulder pain and anterior pain near the deltopectoral region    Overall, patient's symptoms are as above    ROS:    Constiutional:Pt denies fever, chills, nausea, or vomiting.  MSK:as above        Objective      Past Medical History  Past Medical History:   Diagnosis Date    Anesthesia complication     decreased oxygen and heart rate after knee surgery     Arthritis     osteoarthritis    Cancer     cervical and skin    Disease of thyroid gland     GERD (gastroesophageal reflux disease)     Hard of hearing     has hearing aid but does not wear it     History of transfusion     Interstitial cystitis     Knee swelling     Thats was mu first knee    PONV (postoperative nausea and vomiting)     Rotator cuff syndrome     Wears dentures     Wears glasses      Social History     Socioeconomic History    Marital status:    Tobacco Use    Smoking status: Never    Smokeless tobacco: Never   Vaping Use    Vaping status: Never Used   Substance and Sexual Activity    Alcohol use: No    Drug use: No    Sexual activity: Defer          Assessment    Assessment:  1. Arthritis of right acromioclavicular joint        Plan:  Recommend over the counter anti-inflammatories for pain and/or swelling  Right AC joint arthritis--repeat injection will be given today.  Follow-up in 4 to 5 months.      Procedure Note:    I discussed with the patient the potential benefits of performing a therapeutic injections as well as potential risks including but not limited to infection, swelling, pain, bleeding, bruising, nerve/vessel damage, skin color changes, transient elevation in  blood glucose levels, and fat atrophy. After informed consent and after the areas were prepped with chlorhexadine soap, ethyl chloride was used to numb the skin. Via the superior approach, a combination of 1mL of 1% lidocaine and 20mg of Kenalog were injected into the AC joint of the right shoulder. The patient tolerated the procedure well. There were no complications. A sterile dressing was placed over the injection sites.        Tristan Gonzalez MD  06/18/24  12:56 EDT      Dictated Utilizing Dragon Dictation.

## 2024-10-21 ENCOUNTER — TRANSCRIBE ORDERS (OUTPATIENT)
Dept: NUTRITION | Facility: HOSPITAL | Age: 84
End: 2024-10-21
Payer: MEDICARE

## 2024-10-21 DIAGNOSIS — K31.84 GASTROPARESIS: Primary | ICD-10-CM

## 2024-10-31 ENCOUNTER — TRANSCRIBE ORDERS (OUTPATIENT)
Dept: NUTRITION | Facility: HOSPITAL | Age: 84
End: 2024-10-31
Payer: MEDICARE

## 2024-10-31 DIAGNOSIS — K31.84 GASTROPARESIS: Primary | ICD-10-CM

## 2024-11-19 ENCOUNTER — OFFICE VISIT (OUTPATIENT)
Dept: ORTHOPEDIC SURGERY | Facility: CLINIC | Age: 84
End: 2024-11-19
Payer: MEDICARE

## 2024-11-19 VITALS
BODY MASS INDEX: 28.17 KG/M2 | DIASTOLIC BLOOD PRESSURE: 86 MMHG | WEIGHT: 159 LBS | SYSTOLIC BLOOD PRESSURE: 128 MMHG | HEIGHT: 63 IN

## 2024-11-19 DIAGNOSIS — M19.011 ARTHRITIS OF RIGHT ACROMIOCLAVICULAR JOINT: Primary | ICD-10-CM

## 2024-11-19 PROCEDURE — 20605 DRAIN/INJ JOINT/BURSA W/O US: CPT | Performed by: ORTHOPAEDIC SURGERY

## 2024-11-19 RX ORDER — TRIAMCINOLONE ACETONIDE 40 MG/ML
20 INJECTION, SUSPENSION INTRA-ARTICULAR; INTRAMUSCULAR
Status: COMPLETED | OUTPATIENT
Start: 2024-11-19 | End: 2024-11-19

## 2024-11-19 RX ORDER — LIDOCAINE HYDROCHLORIDE 10 MG/ML
1 INJECTION, SOLUTION EPIDURAL; INFILTRATION; INTRACAUDAL; PERINEURAL
Status: COMPLETED | OUTPATIENT
Start: 2024-11-19 | End: 2024-11-19

## 2024-11-19 RX ADMIN — TRIAMCINOLONE ACETONIDE 20 MG: 40 INJECTION, SUSPENSION INTRA-ARTICULAR; INTRAMUSCULAR at 10:27

## 2024-11-19 RX ADMIN — LIDOCAINE HYDROCHLORIDE 1 ML: 10 INJECTION, SOLUTION EPIDURAL; INFILTRATION; INTRACAUDAL; PERINEURAL at 10:27

## 2024-11-19 NOTE — PROGRESS NOTES
Procedure   - Medium Joint Arthrocentesis: R acromioclavicular on 11/19/2024 10:27 AM  Indications: pain  Details: 25 G needle, superior approach  Medications: 1 mL lidocaine PF 1% 1 %; 20 mg triamcinolone acetonide 40 MG/ML  Outcome: tolerated well, no immediate complications  Procedure, treatment alternatives, risks and benefits explained, specific risks discussed. Consent was given by the patient. Immediately prior to procedure a time out was called to verify the correct patient, procedure, equipment, support staff and site/side marked as required. Patient was prepped and draped in the usual sterile fashion.

## 2024-11-19 NOTE — PROGRESS NOTES
"    [facility] [place] [facility] Clinic Note        Subjective     CC: Follow-up (5 month recheck- Arthritis of right acromioclavicular joint, Right Shoulder)      HPI    Eli Clayton is a 84 y.o. female.  Patient here with her daughter for follow-up of her right shoulder.  Fell in July on both of her elbows which exacerbated her underlying shoulder pain.  Last injection 6/18/2024.    Overall, patient's symptoms are as above.    ROS:    Constiutional:Pt denies fever, chills, nausea, or vomiting.  MSK:as above        Objective      Past Medical History  Past Medical History:   Diagnosis Date    Anesthesia complication     decreased oxygen and heart rate after knee surgery     Arthritis     osteoarthritis    Cancer     cervical and skin    Disease of thyroid gland     GERD (gastroesophageal reflux disease)     Hard of hearing     has hearing aid but does not wear it     History of transfusion     Interstitial cystitis     Knee swelling     Thats was mu first knee    PONV (postoperative nausea and vomiting)     Rotator cuff syndrome     Wears dentures     Wears glasses      Social History     Socioeconomic History    Marital status:    Tobacco Use    Smoking status: Never    Smokeless tobacco: Never   Vaping Use    Vaping status: Never Used   Substance and Sexual Activity    Alcohol use: No    Drug use: No    Sexual activity: Defer          Physical Exam  /86   Ht 160 cm (62.99\")   Wt 72.1 kg (159 lb)   BMI 28.17 kg/m²     Body mass index is 28.17 kg/m².    Patient is well nourished and well developed.        Ortho Exam  Musculoskeletal   Upper Extremity   Right Shoulder       Strength and Tone:    Supraspinatus -4-5 with pain    External Rotators-5/5    Infraspinatus - 5/5    Subscapularis - 5/5    Deltoid - 5/5     Range of Motion      Right Shoulder:    Internal Rotation: ROM - L4    External Rotation: AROM - 70 degrees    Elevation through flexion: AROM - 140 degrees     AC joint:  tender to " palpation    AC joint: Positive crossover      Imaging/Labs/EMG Reviewed and Interpreted:  Imaging Results (Last 24 Hours)       ** No results found for the last 24 hours. **              Assessment    Assessment:  1. Arthritis of right acromioclavicular joint        Plan:  Recommend over the counter anti-inflammatories for pain and/or swelling  Right AC joint arthritis and chronic right shoulder pain--repeat injection given today.  Follow-up in 6 months.  X-ray of the right shoulder needed prior to being seen next time      Procedure Note:    I discussed with the patient the potential benefits of performing a therapeutic injections as well as potential risks including but not limited to infection, swelling, pain, bleeding, bruising, nerve/vessel damage, skin color changes, transient elevation in blood glucose levels, and fat atrophy. After informed consent and after the areas were prepped with chlorhexadine soap, ethyl chloride was used to numb the skin. Via the superior approach, a combination of 1mL of 1% lidocaine and 20mg of Kenalog were injected into the AC joint of the right shoulder. The patient tolerated the procedure well. There were no complications. A sterile dressing was placed over the injection sites.        Tristan Gonzalez MD  11/19/24  10:43 EST      Dictated Utilizing Dragon Dictation.

## 2025-01-09 ENCOUNTER — OFFICE VISIT (OUTPATIENT)
Dept: ORTHOPEDIC SURGERY | Facility: CLINIC | Age: 85
End: 2025-01-09
Payer: MEDICARE

## 2025-01-09 VITALS — WEIGHT: 158 LBS | BODY MASS INDEX: 28 KG/M2 | HEIGHT: 63 IN

## 2025-01-09 DIAGNOSIS — Z96.653 HISTORY OF BILATERAL KNEE REPLACEMENT: Primary | ICD-10-CM

## 2025-01-09 NOTE — PROGRESS NOTES
"    [facility] [place] [facility] [place]        Subjective     CC: Follow-up (/8.5 month follow up -- History of bilateral knee replacement/)      KRISTOPHER    Eli Clayton is a 84 y.o. female.  Patient is here today with her daughter for follow-up of her bilateral TKAs.  Says she is having little bit of bruising on the left side and some hypersensitivity on the right side.  Little bit of catching is also occurring on the right side.    Overall, patient's symptoms are as above    ROS:    Constiutional:Pt denies fever, chills, nausea, or vomiting.  MSK:as above        Objective      Past Medical History  Past Medical History:   Diagnosis Date    Anesthesia complication     decreased oxygen and heart rate after knee surgery     Arthritis     osteoarthritis    Cancer     cervical and skin    Disease of thyroid gland     GERD (gastroesophageal reflux disease)     Hard of hearing     has hearing aid but does not wear it     History of transfusion     Interstitial cystitis     Knee swelling     Thats was mu first knee    PONV (postoperative nausea and vomiting)     Rotator cuff syndrome     Wears dentures     Wears glasses      Social History     Socioeconomic History    Marital status:    Tobacco Use    Smoking status: Never    Smokeless tobacco: Never   Vaping Use    Vaping status: Never Used   Substance and Sexual Activity    Alcohol use: No    Drug use: No    Sexual activity: Defer          Physical Exam  Ht 160 cm (63\")   Wt 71.7 kg (158 lb)   BMI 27.99 kg/m²     Body mass index is 27.99 kg/m².    Patient is well nourished and well developed.        Ortho Exam        Lower Extremity:     Inspection and Palpation:      Bilateral knees:  Calf:  Soft and non tender  Effusion:  None  Pulses:  2+  Warmth:  None  Incision:  Healed     ROM:  Right:  Extension:0    Flexion:120  Left:     Extension: 0    Flexion:  120      Functional Testing:  Bilateral:  Straight Leg Raise: 5/5  Patella Tracking:  " Normal          Imaging/Labs/EMG Reviewed and Interpreted:  Imaging Results (Last 24 Hours)       Procedure Component Value Units Date/Time    XR Knee 3 View Bilateral [[id number]] Resulted: 01/09/25 1031     Updated: 01/09/25 1031    Narrative:      Knee X-ray    Indication: status-post TKA    Study:  AP, Lateral, and Sunrise views of Bilateral knee    Comparison: Bilateral knee 8/10/2023    Findings:  No signs of acute fracture are visualized  No signs of loosening are appreciated  Components are well aligned    Impression:  Status post Bilateral cemented total knee arthroplasty. No   signs of loosening or fracture.                Assessment    Assessment:  1. History of bilateral knee replacement        Plan:  Recommend over the counter anti-inflammatories for pain and/or swelling  Status post bilateral TKA--I told her to pay attention to the catching on the right side.  And we will see anything structurally that would explain why she is having those types of symptoms.  If they worsen, we certainly can look into things further.  Needs to continue indefinite antibiotic prophylaxis.  Follow-up in a year with x-rays or sooner if necessary.      Tristan Gonzalez MD  01/09/25  12:59 EST      Dictated Utilizing Dragon Dictation.

## 2025-05-20 ENCOUNTER — OFFICE VISIT (OUTPATIENT)
Dept: ORTHOPEDIC SURGERY | Facility: CLINIC | Age: 85
End: 2025-05-20
Payer: MEDICARE

## 2025-05-20 VITALS
BODY MASS INDEX: 29.23 KG/M2 | WEIGHT: 165 LBS | DIASTOLIC BLOOD PRESSURE: 72 MMHG | SYSTOLIC BLOOD PRESSURE: 118 MMHG | HEIGHT: 63 IN

## 2025-05-20 DIAGNOSIS — S46.011D TRAUMATIC COMPLETE TEAR OF RIGHT ROTATOR CUFF, SUBSEQUENT ENCOUNTER: Primary | ICD-10-CM

## 2025-05-20 DIAGNOSIS — M19.011 ARTHRITIS OF RIGHT ACROMIOCLAVICULAR JOINT: ICD-10-CM

## 2025-05-20 DIAGNOSIS — G89.29 CHRONIC RIGHT SHOULDER PAIN: ICD-10-CM

## 2025-05-20 DIAGNOSIS — M25.511 CHRONIC RIGHT SHOULDER PAIN: ICD-10-CM

## 2025-05-20 RX ORDER — TRIAMCINOLONE ACETONIDE 40 MG/ML
40 INJECTION, SUSPENSION INTRA-ARTICULAR; INTRAMUSCULAR
Status: COMPLETED | OUTPATIENT
Start: 2025-05-20 | End: 2025-05-20

## 2025-05-20 RX ORDER — SULFAMETHOXAZOLE AND TRIMETHOPRIM 800; 160 MG/1; MG/1
TABLET ORAL
COMMUNITY

## 2025-05-20 RX ORDER — PHENAZOPYRIDINE HYDROCHLORIDE 200 MG/1
TABLET, FILM COATED ORAL
COMMUNITY

## 2025-05-20 RX ORDER — LIDOCAINE HYDROCHLORIDE 10 MG/ML
3 INJECTION, SOLUTION EPIDURAL; INFILTRATION; INTRACAUDAL; PERINEURAL
Status: COMPLETED | OUTPATIENT
Start: 2025-05-20 | End: 2025-05-20

## 2025-05-20 RX ADMIN — LIDOCAINE HYDROCHLORIDE 3 ML: 10 INJECTION, SOLUTION EPIDURAL; INFILTRATION; INTRACAUDAL; PERINEURAL at 10:06

## 2025-05-20 RX ADMIN — TRIAMCINOLONE ACETONIDE 40 MG: 40 INJECTION, SUSPENSION INTRA-ARTICULAR; INTRAMUSCULAR at 10:06

## 2025-05-20 NOTE — PROGRESS NOTES
"    [facility] [place] [facility] Note        Subjective     CC: Follow-up (Arthritis of right acromioclavicular joint)      HPI    Eli Clayton is a 85 y.o. female.  Patient is here today with her daughter for follow-up of her chronic right shoulder pain.  Her last visit in November 2024 for the right shoulder, patient received a right AC joint injection which helped her a little bit only.  She says her shoulder feels little different than before and hurts more anterolaterally than anywhere else.    Overall, patient's symptoms are as above    ROS:    Constiutional:Pt denies fever, chills, nausea, or vomiting.  MSK:as above        Objective      Past Medical History  Past Medical History:   Diagnosis Date    Anesthesia complication     decreased oxygen and heart rate after knee surgery     Arthritis     osteoarthritis    Cancer     cervical and skin    Disease of thyroid gland     GERD (gastroesophageal reflux disease)     Hard of hearing     has hearing aid but does not wear it     History of transfusion     Interstitial cystitis     Knee swelling     Thats was mu first knee    PONV (postoperative nausea and vomiting)     Rotator cuff syndrome     Wears dentures     Wears glasses      Social History     Socioeconomic History    Marital status:    Tobacco Use    Smoking status: Never    Smokeless tobacco: Never   Vaping Use    Vaping status: Never Used   Substance and Sexual Activity    Alcohol use: No    Drug use: No    Sexual activity: Defer          Physical Exam  /72   Ht 160 cm (62.99\")   Wt 74.8 kg (165 lb)   BMI 29.24 kg/m²     Body mass index is 29.24 kg/m².    Patient is well nourished and well developed.        Ortho Exam  Musculoskeletal   Upper Extremity   Right Shoulder       Strength and Tone:    Supraspinatus -4-5 with pain    External Rotators-5/5    Infraspinatus - 5/5    Subscapularis - 5/5    Deltoid - 5/5     Range of Motion      Right Shoulder:    Internal Rotation: ROM - " L4    External Rotation: AROM - 70 degrees    Elevation through flexion: AROM - 140 degrees     AC joint:  tender to palpation    AC joint:  negative crossover      Imaging/Labs/EMG Reviewed and Interpreted:  Imaging Results (Last 24 Hours)       Procedure Component Value Units Date/Time    XR Shoulder 2+ View Right [[id number]] Resulted: 05/20/25 0953     Updated: 05/20/25 0953    Narrative:      Right Shoulder X-Ray    Indication: Pain    Study:  Grashey AP, axillary lateral, and scapular Y views    Comparison: Right shoulder 4/16/2024    Findings:  No acute fractures are visualized  No bony lesions are visualized.  Normal soft tissue appearance  AC joint: Severe joint space narrowing  Glenohumeral joint: Mild joint space narrowing  Acromion type: 3      Impression:    No acute bony abnormalities noted  Type III acromion  Severe AC joint arthritis  Changes at greater tuberosity consistent with chronic cuff pathology              Assessment    Assessment:  1. Chronic right shoulder pain    2. Arthritis of right acromioclavicular joint    3. Traumatic complete tear of right rotator cuff, subsequent encounter        Plan:  Recommend over the counter anti-inflammatories for pain and/or swelling  Chronic right shoulder pain with AC joint arthritis and rotator cuff tear status post repair--patient seems to be having more rotator cuff syndrome today.  Diagnostic and therapeutic injection will be given into the subacromial space we will assess efficacy when she returns in 6 months.      Procedure Note:    I discussed with the patient the potential benefits of performing a therapeutic injections as well as potential risks including but not limited to infection, swelling, pain, bleeding, bruising, nerve/vessel damage, skin color changes, transient elevation in blood glucose levels, elevated blood pressure and fat atrophy. After informed consent and after the areas were prepped with chlorhexadine soap, ethyl chloride was used  to numb the skin. Via the posterolateral approach, 3mL of 1% lidocaine followed by 40mg of Kenalog were each injected into the subacromial space of the right shoulder. The patient tolerated the procedure well. There were no complications. A sterile dressing was placed over the injection sites.        Tristan Gonzalez MD  05/20/25  10:03 EDT      Dictated Utilizing Dragon Dictation.

## 2025-05-20 NOTE — PROGRESS NOTES
Procedure   - Large Joint Arthrocentesis: L subacromial bursa on 5/20/2025 10:06 AM  Indications: pain  Details: 21 G needle, posterior approach  Medications: 3 mL lidocaine PF 1% 1 %; 40 mg triamcinolone acetonide 40 MG/ML  Outcome: tolerated well, no immediate complications  Procedure, treatment alternatives, risks and benefits explained, specific risks discussed. Consent was given by the patient. Immediately prior to procedure a time out was called to verify the correct patient, procedure, equipment, support staff and site/side marked as required. Patient was prepped and draped in the usual sterile fashion.

## (undated) DEVICE — SHOULDER STABILIZATION KIT,                                    DISPOSABLE 12 PER BOX

## (undated) DEVICE — PK ARTHSCP SHLDR 10

## (undated) DEVICE — SUT PDS 0 CTX 36IN VIO PDP370T

## (undated) DEVICE — PIN DRL NOHEAD TROC 3.2X75MM BX/4

## (undated) DEVICE — DYONICS 25 INFLOW TUBE SET, 3 PER BOX

## (undated) DEVICE — BNDG ELAS MATRX V/CLS 6INX10YD LF

## (undated) DEVICE — T4 PULLOVER TOGA, X-LARGE

## (undated) DEVICE — SUT ETHLN 3/0 PC5 18IN 1893G

## (undated) DEVICE — EXPRESSEW III SUTURE NEEDLE FOR USE WITH EXPRESSEW II OR III SUTURE PASSER: Brand: EXPRESSEW

## (undated) DEVICE — ANTIBACTERIAL UNDYED BRAIDED (POLYGLACTIN 910), SYNTHETIC ABSORBABLE SUTURE: Brand: COATED VICRYL

## (undated) DEVICE — NDL HYPO ECLPS SFTY 18G 1 1/2IN

## (undated) DEVICE — PK KN TOTL 10

## (undated) DEVICE — UNDERCAST PADDING: Brand: DEROYAL

## (undated) DEVICE — SLNG ULTRSLING2 11TO13IN MD

## (undated) DEVICE — STRYKER PERFORMANCE SERIES SAGITTAL BLADE: Brand: STRYKER PERFORMANCE SERIES

## (undated) DEVICE — Device

## (undated) DEVICE — SYS SKIN CLS DERMABOND PRINEO W/22CM MESH TP

## (undated) DEVICE — DRSNG PAD ABD 8X10IN STRL

## (undated) DEVICE — IDEAL SUTURE SHUTTLE, 90 DEGREES UP: Brand: IDEAL

## (undated) DEVICE — SCRW HEX PERSONA FML 2.5X25MM PK/2
Type: IMPLANTABLE DEVICE | Site: KNEE | Status: NON-FUNCTIONAL
Removed: 2019-08-21

## (undated) DEVICE — SUT MONOCRYL PLS ANTIB UND 3/0  PS1 27IN

## (undated) DEVICE — PUMP PAIN AUTOFUSER AUTO SELCT NOBOLUS 1TO14ML/HR 550ML DISP

## (undated) DEVICE — CANNULA THREADED FLEX 6.5 X 72MM: Brand: CLEAR-TRAC

## (undated) DEVICE — GLV SURG PREMIERPRO MIC LTX PF SZ8 BRN

## (undated) DEVICE — SYS MIX CLEARMIX SGL/DBL VAC

## (undated) DEVICE — 4.5 MM FULL RADIUS STRAIGHT                                    BLADES, POWER/EP-1, YELLOW, PACKAGED                                    6 PER BOX, STERILE: Brand: DYONICS

## (undated) DEVICE — SUPER TURBOVAC 90 IFS: Brand: COBLATION

## (undated) DEVICE — NDL SPINE 22G 31/2IN BLK

## (undated) DEVICE — DRSNG GZ PETROLTM XEROFORM CURAD 1X8IN STRL

## (undated) DEVICE — PAD,ABDOMINAL,8"X10",ST,LF: Brand: MEDLINE

## (undated) DEVICE — SYR LUERLOK 30CC

## (undated) DEVICE — 1010 S-DRAPE TOWEL DRAPE 10/BX: Brand: STERI-DRAPE™

## (undated) DEVICE — 3M™ STERI-DRAPE™ U-DRAPE 1015: Brand: STERI-DRAPE™

## (undated) DEVICE — BNDG ELAS W/CLIP 6IN 10YD LF STRL

## (undated) DEVICE — CANNULA THREADED FLEX 8.0 X 72MM: Brand: CLEAR-TRAC

## (undated) DEVICE — INTENDED USE FOR SURGICAL MARKING ON INTACT SKIN, ALSO PROVIDES A PERMANENT METHOD OF IDENTIFYING OBJECTS IN THE OPERATING ROOM: Brand: WRITESITE® REGULAR TIP SKIN MARKER

## (undated) DEVICE — 3M™ STERI-DRAPE™ INCISE DRAPE 1050 (60CM X 45CM): Brand: STERI-DRAPE™

## (undated) DEVICE — SPNG GZ WOVN 4X4IN 12PLY 10/BX STRL

## (undated) DEVICE — STERILE PVP: Brand: MEDLINE INDUSTRIES, INC.

## (undated) DEVICE — T-MAX DISPOSABLE FACE MASK 8 PER BOX